# Patient Record
Sex: FEMALE | Race: WHITE | Employment: FULL TIME | URBAN - METROPOLITAN AREA
[De-identification: names, ages, dates, MRNs, and addresses within clinical notes are randomized per-mention and may not be internally consistent; named-entity substitution may affect disease eponyms.]

---

## 2018-10-06 ENCOUNTER — NURSE TRIAGE (OUTPATIENT)
Dept: NURSING | Facility: CLINIC | Age: 23
End: 2018-10-06

## 2018-10-06 NOTE — TELEPHONE ENCOUNTER
Pt states she woke this Am w/ mild to moderate sore throat. This continued for several hours then improved. Currently no throat pain at all. States she thinks she has fever because she feels chilly but cannot find thermometer so we do not know for sure. Denies breathing or swallowing difficulty. C/o mild cough. No nasal congestion. No ear pain. 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 Advised home care per guideline. Advised pt get thermometer and if temp is over 102 she may wish to be seen at  tomorrow. Call if new/worse sx. Pt voiced understanding and agreement. Livier Quintero RN/FNA      Additional Information    Negative: Severe difficulty breathing (e.g., struggling for each breath, speaks in single words, stridor)    Negative: Sounds like a life-threatening emergency to the triager    Negative: [1] Diagnosed strep throat AND [2] taking antibiotic AND [3] symptoms continue    Negative: Throat culture results, call about    Negative: Productive cough is main symptom    Negative: Non-productive cough is main symptom    Negative: Hoarseness is main symptom    Negative: Runny nose is main symptom    Negative: [1] Drooling or spitting out saliva (because can't swallow) AND [2] normal breathing    Negative: Unable to open mouth completely    Negative: [1] Difficulty breathing AND [2] not severe    Negative: [1] Refuses to drink anything AND [2] for > 12 hours    Negative: [1] Drinking very little AND [2] dehydration suspected (e.g., no urine > 12 hours, very dry mouth, very lightheaded)    Negative: Patient sounds very sick or weak to the triager    Negative: SEVERE (e.g., excruciating) throat pain    Negative: [1] Rash AND [2] widespread (especially chest and abdomen)    Negative: Earache also present    Negative: Fever present > 3 days (72 hours)    Negative: Diabetes mellitus or weak immune system (e.g., HIV positive, cancer chemo,  "splenectomy, organ transplant)    Negative: History of rheumatic fever    Negative: [1] Adult is leaving on a trip AND [2] requests an antibiotic NOW    Negative: [1] Positive throat culture or rapid strep test (according to lab, PCP, caller, etc.) AND [2] NO  standing order to call in prescription for antibiotic    Negative: [1] Exposure to family member (or spouse or boyfriend/girlfriend) with test-proven strep AND [2] within last 10 days    Negative: [1] Sore throat is the only symptom AND [2] present > 48 hours    Negative: [1] Sore throat with cough/cold symptoms AND [2] present > 5 days    Negative: [1] Sore throat is the only symptom AND [2] sore throat present < 48 hours  (all triage questions negative)    [1] Sore throat with cough/cold symptoms AND [2] present < 5 days   (all triage questions negative)    Commented on: Fever > 104 F (40 C)     Temp unknown. Couldn't find thermometer.    Commented on: [1] Pus on tonsils (back of throat) AND [2]  fever AND [3] swollen neck lymph nodes (\"glands\")     Temp unknown. Couldn't find thermometer.    Protocols used: SORE THROAT-ADULT-    "

## 2019-08-09 NOTE — PROGRESS NOTES
SUBJECTIVE:   CC: Mora Maldonado is an 24 year old woman who presents for preventive health visit.     Healthy Habits:    Do you get at least three servings of calcium containing foods daily (dairy, green leafy vegetables, etc.)? no    Amount of exercise or daily activities, outside of work: Active at work and goes to the gym, rock climbing     Problems taking medications regularly not applicable    Medication side effects: No    Have you had an eye exam in the past two years? no    Do you see a dentist twice per year? no    Do you have sleep apnea, excessive snoring or daytime drowsiness?yes, drowsiness    Today's PHQ-2 Score:   PHQ-2 ( 1999 [employer]) 8/13/2019   Q1: Little interest or pleasure in doing things 0   Q2: Feeling down, depressed or hopeless 0   PHQ-2 Score 0       Abuse: Current or Past(Physical, Sexual or Emotional)- No  Do you feel safe in your environment? Yes    Social History     Tobacco Use     Smoking status: Never Smoker     Smokeless tobacco: Never Used   Substance Use Topics     Alcohol use: Yes     Frequency: Monthly or less     If you drink alcohol do you typically have >3 drinks per day or >7 drinks per week? No                     Reviewed orders with patient.  Reviewed health maintenance and updated orders accordingly - Yes  Lab work is in process  There is no problem list on file for this patient.    No past surgical history on file.    Social History     Tobacco Use     Smoking status: Never Smoker     Smokeless tobacco: Never Used   Substance Use Topics     Alcohol use: Yes     Frequency: Monthly or less     Family History   Problem Relation Age of Onset     Rheumatoid Arthritis Mother      Thyroid Disease Maternal Grandmother          Current Outpatient Medications   Medication Sig Dispense Refill     citalopram (CELEXA) 20 MG tablet Take 20 mg by mouth       etonogestrel (IMPLANON/NEXPLANON) 68 MG IMPL Inject 68 mg Subcutaneous       Allergies   Allergen Reactions      "Amoxicillin Hives     Penicillins Hives       Mammogram not appropriate for this patient based on age.    Pertinent mammograms are reviewed under the imaging tab.  History of abnormal Pap smear: NO - age 21-29 PAP every 3 years recommended. Due for PAP this visit, unfortunately bleeding currently. LMP: 08/7/2019     Reviewed and updated as needed this visit by clinical staff  Tobacco  Allergies  Meds  Soc Hx      Reviewed and updated as needed this visit by Provider  Allergies        No past medical history on file.   No past surgical history on file.  OB History   No data available       ROS:  CONSTITUTIONAL: NEGATIVE for fever, chills, change in weight  INTEGUMENTARU/SKIN: NEGATIVE for worrisome rashes, moles or lesions  EYES: NEGATIVE for vision changes or irritation  ENT: NEGATIVE for ear, mouth and throat problems  RESP: NEGATIVE for significant cough or SOB  BREAST: NEGATIVE for masses, tenderness or discharge  CV: NEGATIVE for chest pain, palpitations or peripheral edema  GI: NEGATIVE for nausea, abdominal pain, heartburn, or change in bowel habits  : NEGATIVE for unusual urinary or vaginal symptoms. Periods are regular.  MUSCULOSKELETAL: NEGATIVE for significant arthralgias or myalgia  NEURO: NEGATIVE for weakness, dizziness or paresthesias  ENDOCRINE: POSITIVE  for hair loss and weight gain  HEME/ALLERGY/IMMUNE: NEGATIVE for bleeding problems  PSYCHIATRIC: NEGATIVE for changes in mood or affect    OBJECTIVE:   /82   Pulse 76   Temp 98.6  F (37  C) (Tympanic)   Resp 20   Ht 1.59 m (5' 2.6\")   Wt 72.5 kg (159 lb 12.8 oz)   SpO2 100%   BMI 28.67 kg/m    EXAM:  GENERAL: healthy, alert and no distress  EYES: Eyes grossly normal to inspection, PERRL and conjunctivae and sclerae normal  HENT: ear canals and TM's normal, nose and mouth without ulcers or lesions  NECK: no adenopathy, no asymmetry, masses, or scars and thyroid normal to palpation  RESP: lungs clear to auscultation - no rales, " "rhonchi or wheezes  BREAST: normal without masses, tenderness or nipple discharge and no palpable axillary masses or adenopathy  CV: regular rate and rhythm, normal S1 S2, no S3 or S4, no murmur, click or rub, no peripheral edema and peripheral pulses strong  ABDOMEN: soft, nontender, no hepatosplenomegaly, no masses and bowel sounds normal  MS: no gross musculoskeletal defects noted, no edema  SKIN: no suspicious lesions or rashes  NEURO: Normal strength and tone, mentation intact and speech normal  PSYCH: mentation appears normal, affect normal/bright  LYMPH: no cervical, supraclavicular, axillary, or inguinal adenopathy    Diagnostic Test Results:  No results found for this or any previous visit (from the past 24 hour(s)).    ASSESSMENT/PLAN:   1. Routine general medical examination at a health care facility  - TSH with free T4 reflex  - Strongly encourage to follow up for PAP only visit     COUNSELING:   Reviewed preventive health counseling, as reflected in patient instructions  Special attention given to:        Regular exercise       Healthy diet/nutrition       Vision screening       Hearing screening       Contraception       Safe sex practices/STD prevention    Estimated body mass index is 28.67 kg/m  as calculated from the following:    Height as of this encounter: 1.59 m (5' 2.6\").    Weight as of this encounter: 72.5 kg (159 lb 12.8 oz).         reports that she has never smoked. She has never used smokeless tobacco.      Counseling Resources:  ATP IV Guidelines  Pooled Cohorts Equation Calculator  Breast Cancer Risk Calculator  FRAX Risk Assessment  ICSI Preventive Guidelines  Dietary Guidelines for Americans, 2010  USDA's MyPlate  ASA Prophylaxis  Lung CA Screening    SHAWNEE Lucio [facility]  "

## 2019-08-13 RX ORDER — CITALOPRAM HYDROBROMIDE 20 MG/1
20 TABLET ORAL
COMMUNITY
Start: 2017-12-12 | End: 2019-09-03

## 2019-08-14 ENCOUNTER — OFFICE VISIT (OUTPATIENT)
Dept: FAMILY MEDICINE | Facility: CLINIC | Age: 24
End: 2019-08-14
Payer: COMMERCIAL

## 2019-08-14 VITALS
HEART RATE: 76 BPM | DIASTOLIC BLOOD PRESSURE: 82 MMHG | HEIGHT: 63 IN | RESPIRATION RATE: 20 BRPM | WEIGHT: 159.8 LBS | TEMPERATURE: 98.6 F | OXYGEN SATURATION: 100 % | BODY MASS INDEX: 28.31 KG/M2 | SYSTOLIC BLOOD PRESSURE: 120 MMHG

## 2019-08-14 DIAGNOSIS — Z00.00 ROUTINE GENERAL MEDICAL EXAMINATION AT A HEALTH CARE FACILITY: Primary | ICD-10-CM

## 2019-08-14 LAB
T4 FREE SERPL-MCNC: 0.7 NG/DL (ref 0.76–1.46)
TSH SERPL DL<=0.005 MIU/L-ACNC: 18.12 MU/L (ref 0.4–4)

## 2019-08-14 PROCEDURE — 36415 COLL VENOUS BLD VENIPUNCTURE: CPT | Performed by: NURSE PRACTITIONER

## 2019-08-14 PROCEDURE — 99395 PREV VISIT EST AGE 18-39: CPT | Performed by: NURSE PRACTITIONER

## 2019-08-14 PROCEDURE — 84439 ASSAY OF FREE THYROXINE: CPT | Performed by: NURSE PRACTITIONER

## 2019-08-14 PROCEDURE — 84443 ASSAY THYROID STIM HORMONE: CPT | Performed by: NURSE PRACTITIONER

## 2019-08-14 SDOH — HEALTH STABILITY: MENTAL HEALTH: HOW OFTEN DO YOU HAVE A DRINK CONTAINING ALCOHOL?: MONTHLY OR LESS

## 2019-08-14 ASSESSMENT — MIFFLIN-ST. JEOR: SCORE: 1427.59

## 2019-08-16 ENCOUNTER — TELEPHONE (OUTPATIENT)
Dept: FAMILY MEDICINE | Facility: CLINIC | Age: 24
End: 2019-08-16

## 2019-08-19 NOTE — TELEPHONE ENCOUNTER
Labs have not been reviewed.  Provider who saw patient is just helping clinic out and is currently not in clinic.  Will send to provider pool to advise on abnormal labs.

## 2019-08-19 NOTE — TELEPHONE ENCOUNTER
Reason for Call:  Other call back    Detailed comments: Patient is calling for status on message left 08/16.    Phone Number Patient can be reached at: [phone]     Best Time:     Can we leave a detailed message on this number? YES    Call taken on 8/19/2019 at 9:55 AM by Fannie Barbour

## 2019-08-19 NOTE — TELEPHONE ENCOUNTER
Patient informed of directives from Margi Kay and verbalized a good understanding.      Appointment scheduled with Kyara Mckee NP on 9/3/19.     Radha Mota RN BSN on 8/19/2019 at 6:39 PM

## 2019-08-19 NOTE — TELEPHONE ENCOUNTER
Looks as though her thyroid is under functioning - patient should follow up with a provider to discuss treatment and follow up

## 2019-08-29 NOTE — PATIENT INSTRUCTIONS
Reminders: If you are signed up for [facility] please be aware your results and communications will be sent within your "[employer]hart. Please remember to arrive 5-10 minutes early for your appointments. If you are late you may need to reschedule your appointment.    Patient Education     Hypothyroidism    You have hypothyroidism. This means your thyroid gland is not making enough thyroid hormone. This hormone is vital to body growth and metabolism. If you don t make enough, many body processes slow down. This can cause symptoms throughout the body. Hypothyroidism can range from mild to severe. The most severe form is called myxedema.  There are a number of causes of hypothyroidism. A common cause is Hashimoto s disease. This disease causes the body s own immune system to attack the thyroid gland. When you have certain treatments, such as surgery to remove the thyroid gland, this can also cause hypothyroidism. Sometimes the thyroid gland is not functioning because of lack of stimulation from the pituitary gland.  Symptoms of hypothyroidism can include:    Fatigue    Trouble concentrating or thinking clearly; forgetfulness    Dry skin    Hair loss    Weight gain    Low tolerance to cold    Constipation    Depression    Personality changes    Tingling or prickling of the hands or feet    Heavy, absent, or irregular periods (women only)  Older adults may sometimes have other symptoms. These can include:    Muscle aches and weakness    Confusion    Incontinence (unable to control urine or stool)    Trouble moving around    Falling  Treatment for hypothyroidism involves taking thyroid hormone pills daily. These pills replace the hormone your thyroid doesn t make. You will likely need to take a daily pill for the rest of your life. Tips for taking this medicine are given below.  Home care  Tips for taking your medicine    Take your thyroid hormone pills as prescribed by your healthcare provider. This is most often 1 pill a day on  an empty stomach. Use a pillbox labeled with the days of the week. This will help you remember to take your pill each day.    Don t take products that contain iron and calcium or antacids within 4 hours of taking your thyroid hormone pills.    Don t take other medicines with your thyroid hormone pill without checking with your provider first.    Tell your provider if you have any side effects from your medicines that bother you, especially any chest pain or irregular heartbeats.    Never change the dosage or stop taking your thyroid pills without talking to your provider first.  General care    Always talk with your provider before trying other medicines or treatments for your thyroid problem.    If you see other healthcare providers, be sure to let them know about your thyroid problem.    Let your healthcare provider know if you become pregnant because your dose of thyroid hormone will need to be adjusted.  Follow-up care  See your healthcare provider for checkups as advised. You may need regular tests to check the level of thyroid hormone in your blood.  When to seek medical advice  Call your healthcare provider right away if any of these occur:    New symptoms develop    Symptoms return, continue, or worsen even after treatment    Extreme fatigue    Puffy hands, face, or feet    Fast or irregular heartbeat    Confusion  Call 911  Call 911 if any of these occur:    Fainting    Chest pain    Shortness of breath or trouble breathing  Date Last Reviewed: 4/1/2018 2000-2018 [employer]. [address] [place] [address], [place] [address]. All rights reserved. This information is not intended as a substitute for professional medical care. Always follow your healthcare professional's instructions.

## 2019-08-29 NOTE — PROGRESS NOTES
Subjective     Mora Maldonado is a 24 year old female who presents to clinic today for the following health issues:    HPI   Patient is here to discuss her thyroid labs- apologized to carlota that she was never told about her thyroid labs. Discussed her level, that guidelines state to not treat unless symptomatic or >10.  She is greater than 10- has had symptoms of weight gain, hair thinning, skin break outs, joints hurt.  She reports that he mother and grand mother have thyroid issues, and her mother has RA and is being worked up for lupus.  She would like labs done.     Patient is also here for a pap        There is no problem list on file for this patient.    History reviewed. No pertinent surgical history.    Social History     Tobacco Use     Smoking status: Never Smoker     Smokeless tobacco: Never Used   Substance Use Topics     Alcohol use: Yes     Frequency: Monthly or less     Family History   Problem Relation Age of Onset     Rheumatoid Arthritis Mother      Thyroid Disease Maternal Grandmother          Current Outpatient Medications   Medication Sig Dispense Refill     etonogestrel (IMPLANON/NEXPLANON) 68 MG IMPL Inject 68 mg Subcutaneous       levothyroxine (SYNTHROID/LEVOTHROID) 50 MCG tablet Take 1 tablet (50 mcg) by mouth daily 90 tablet 0     Allergies   Allergen Reactions     Amoxicillin Hives     Penicillins Hives     Recent Labs   Lab Test 08/14/19  1341   TSH 18.12*      BP Readings from Last 3 Encounters:   09/03/19 130/84   08/14/19 120/82    Wt Readings from Last 3 Encounters:   09/03/19 72.9 kg (160 lb 12.8 oz)   08/14/19 72.5 kg (159 lb 12.8 oz)            Reviewed and updated as needed this visit by Provider  Tobacco  Allergies  Meds  Problems  Med Hx  Surg Hx  Fam Hx         Review of Systems   ROS COMP: Constitutional, HEENT, cardiovascular, pulmonary, GI, , musculoskeletal, neuro, skin, endocrine and psych systems are negative, except as otherwise noted.      Objective    BP  "130/84   Pulse 81   Temp 98.7  F (37.1  C) (Oral)   Resp 16   Wt 72.9 kg (160 lb 12.8 oz)   LMP 08/23/2019 (Approximate)   SpO2 98%   BMI 28.85 kg/m    Body mass index is 28.85 kg/m .     Physical Exam   GENERAL: healthy, alert and no distress  NECK: no adenopathy, no asymmetry, masses, or scars and thyroid normal to palpation  RESP: lungs clear to auscultation - no rales, rhonchi or wheezes  CV: regular rate and rhythm, normal S1 S2, no S3 or S4, no murmur, click or rub, no peripheral edema and peripheral pulses strong  MS: no gross musculoskeletal defects noted, no edema, no CVA tenderness  SKIN: no suspicious rashes  PSYCH: mentation appears normal, affect normal/bright    Diagnostic Test Results:  Labs reviewed in Epic  See orders        Assessment & Plan       ICD-10-CM    1. Screening for malignant neoplasm of cervix Z12.4 Pap imaged thin layer screen reflex to HPV if ASCUS - recommend age 25 - 29   2. Family history of rheumatoid arthritis Z82.61 Anti Nuclear Debbi IgG by IFA with Reflex     Rheumatoid factor     CRP inflammation     Erythrocyte sedimentation rate auto   3. Hypothyroidism, unspecified type E03.9 levothyroxine (SYNTHROID/LEVOTHROID) 50 MCG tablet     **TSH with free T4 reflex FUTURE 2mo     **TSH with free T4 reflex FUTURE 6mo   4. Screening for HIV (human immunodeficiency virus) Z11.4 HIV Screening        BMI:   Estimated body mass index is 28.85 kg/m  as calculated from the following:    Height as of 8/14/19: 1.59 m (5' 2.6\").    Weight as of this encounter: 72.9 kg (160 lb 12.8 oz).   Weight management plan: Discussed healthy diet and exercise guidelines        See Patient Instructions    Return in about 3 months (around 12/3/2019), or if symptoms worsen or fail to improve.    Kyara Mckee, ABELINO  [facility]      "

## 2019-09-03 ENCOUNTER — RESULT FOLLOW UP (OUTPATIENT)
Dept: FAMILY MEDICINE | Facility: CLINIC | Age: 24
End: 2019-09-03

## 2019-09-03 ENCOUNTER — OFFICE VISIT (OUTPATIENT)
Dept: FAMILY MEDICINE | Facility: CLINIC | Age: 24
End: 2019-09-03
Payer: COMMERCIAL

## 2019-09-03 VITALS
HEART RATE: 81 BPM | SYSTOLIC BLOOD PRESSURE: 130 MMHG | TEMPERATURE: 98.7 F | DIASTOLIC BLOOD PRESSURE: 84 MMHG | RESPIRATION RATE: 16 BRPM | BODY MASS INDEX: 28.85 KG/M2 | WEIGHT: 160.8 LBS | OXYGEN SATURATION: 98 %

## 2019-09-03 DIAGNOSIS — R87.612 PAPANICOLAOU SMEAR OF CERVIX WITH LOW GRADE SQUAMOUS INTRAEPITHELIAL LESION (LGSIL): ICD-10-CM

## 2019-09-03 DIAGNOSIS — Z11.4 SCREENING FOR HIV (HUMAN IMMUNODEFICIENCY VIRUS): ICD-10-CM

## 2019-09-03 DIAGNOSIS — Z82.61 FAMILY HISTORY OF RHEUMATOID ARTHRITIS: ICD-10-CM

## 2019-09-03 DIAGNOSIS — E03.9 HYPOTHYROIDISM, UNSPECIFIED TYPE: ICD-10-CM

## 2019-09-03 DIAGNOSIS — Z12.4 SCREENING FOR MALIGNANT NEOPLASM OF CERVIX: Primary | ICD-10-CM

## 2019-09-03 LAB — ERYTHROCYTE [SEDIMENTATION RATE] IN BLOOD BY WESTERGREN METHOD: 9 MM/H (ref 0–20)

## 2019-09-03 PROCEDURE — 86431 RHEUMATOID FACTOR QUANT: CPT | Performed by: NURSE PRACTITIONER

## 2019-09-03 PROCEDURE — 86140 C-REACTIVE PROTEIN: CPT | Performed by: NURSE PRACTITIONER

## 2019-09-03 PROCEDURE — 85652 RBC SED RATE AUTOMATED: CPT | Performed by: NURSE PRACTITIONER

## 2019-09-03 PROCEDURE — 87389 HIV-1 AG W/HIV-1&-2 AB AG IA: CPT | Performed by: NURSE PRACTITIONER

## 2019-09-03 PROCEDURE — G0145 SCR C/V CYTO,THINLAYER,RESCR: HCPCS | Performed by: NURSE PRACTITIONER

## 2019-09-03 PROCEDURE — 86038 ANTINUCLEAR ANTIBODIES: CPT | Performed by: NURSE PRACTITIONER

## 2019-09-03 PROCEDURE — 86039 ANTINUCLEAR ANTIBODIES (ANA): CPT | Performed by: NURSE PRACTITIONER

## 2019-09-03 PROCEDURE — 99214 OFFICE O/P EST MOD 30 MIN: CPT | Performed by: NURSE PRACTITIONER

## 2019-09-03 PROCEDURE — G0124 SCREEN C/V THIN LAYER BY MD: HCPCS | Performed by: NURSE PRACTITIONER

## 2019-09-03 PROCEDURE — 36415 COLL VENOUS BLD VENIPUNCTURE: CPT | Performed by: NURSE PRACTITIONER

## 2019-09-03 RX ORDER — LEVOTHYROXINE SODIUM 50 UG/1
50 TABLET ORAL DAILY
Qty: 90 TABLET | Refills: 0 | Status: SHIPPED | OUTPATIENT
Start: 2019-09-03 | End: 2019-11-19

## 2019-09-04 LAB
CRP SERPL-MCNC: <2.9 MG/L (ref 0–8)
HIV 1+2 AB+HIV1 P24 AG SERPL QL IA: NONREACTIVE
RHEUMATOID FACT SER NEPH-ACNC: <20 IU/ML (ref 0–20)

## 2019-09-05 DIAGNOSIS — Z83.2 FAMILY HISTORY OF AUTOIMMUNE DISORDER: Primary | ICD-10-CM

## 2019-09-05 DIAGNOSIS — E03.9 HYPOTHYROIDISM, UNSPECIFIED TYPE: ICD-10-CM

## 2019-09-05 DIAGNOSIS — R76.0 ABNORMAL ANTINUCLEAR ANTIBODY TITER: ICD-10-CM

## 2019-09-05 LAB
ANA PAT SER IF-IMP: ABNORMAL
ANA SER QL IF: ABNORMAL
ANA TITR SER IF: ABNORMAL {TITER}

## 2019-09-05 NOTE — RESULT ENCOUNTER NOTE
Brandyn Velazco,    Thank you for your recent office visit.    Here are your recent results.  Your blood labs do show that you are borderline cameron positive; which can mean you are predisposed for autoimmune issues.  Due to your family history, I am placing a referral for you to further discuss with rheumatology.  Please schedule with them at this time.  Remember to have your thyroid labs rechecked in 2-3 months. Follow up as needed.     Feel free to contact me via [employer] or call the clinic at [phone].    Sincerely,    SHAWNEE Gallegos, FNP-BC

## 2019-09-06 LAB
COPATH REPORT: ABNORMAL
PAP: ABNORMAL

## 2019-09-09 PROBLEM — E03.9 HYPOTHYROIDISM, UNSPECIFIED TYPE: Status: ACTIVE | Noted: 2019-09-09

## 2019-09-20 ENCOUNTER — OFFICE VISIT (OUTPATIENT)
Dept: OBGYN | Facility: CLINIC | Age: 24
End: 2019-09-20
Payer: COMMERCIAL

## 2019-09-20 VITALS
WEIGHT: 160.2 LBS | HEART RATE: 92 BPM | DIASTOLIC BLOOD PRESSURE: 74 MMHG | SYSTOLIC BLOOD PRESSURE: 119 MMHG | BODY MASS INDEX: 28.74 KG/M2

## 2019-09-20 DIAGNOSIS — R87.612 PAPANICOLAOU SMEAR OF CERVIX WITH LOW GRADE SQUAMOUS INTRAEPITHELIAL LESION (LGSIL): Primary | ICD-10-CM

## 2019-09-20 PROCEDURE — 99203 OFFICE O/P NEW LOW 30 MIN: CPT | Mod: 25 | Performed by: OBSTETRICS & GYNECOLOGY

## 2019-09-20 PROCEDURE — 57454 BX/CURETT OF CERVIX W/SCOPE: CPT | Performed by: OBSTETRICS & GYNECOLOGY

## 2019-09-20 PROCEDURE — 88305 TISSUE EXAM BY PATHOLOGIST: CPT | Performed by: OBSTETRICS & GYNECOLOGY

## 2019-09-20 NOTE — PROGRESS NOTES
Patient Name: Mora Maldonado              Date: 9/20/2019   YOB: 1995                         Age: 24 year old   Phone: [phone] (home)   ________________________________________________________________________  I have been asked to see Mora in consultation by TIFFANY BERNAL  to discuss the pap smear, findings and possible further evaluation.  The patient's pap smear history is as noted:  2014 NIL pap. (Found in Care Everywhere).  9/3/19 LSIL pap. Plan colp.     I attempted to ensure that the patient was educated regarding the nature of her findings and implications to date.  We reviewed the role of HPV, incidence in the population and the natural history of the infection, and its transmission.  We also reviewed ways to minimize her future risk, the effect of HPV on the cervix and treatment options available, should they be indicated.    The pathophysiology of the cervix, including a discussion of the squamous and columnar cells, metaplasia and dysplasia have been reviewed, drawings, sketches and the pamphlets were reviewed with her.      Patient's last menstrual period was 08/23/2019 (approximate).  Current Birth Control Method: IMPLANT    Past Medical History:   Diagnosis Date     Abnormal Pap smear of cervix 09/03/2019    See problem list     Thyroid disease        History reviewed. No pertinent surgical history.     Outpatient Encounter Medications as of 9/20/2019   Medication Sig Dispense Refill     etonogestrel (IMPLANON/NEXPLANON) 68 MG IMPL Inject 68 mg Subcutaneous       levothyroxine (SYNTHROID/LEVOTHROID) 50 MCG tablet Take 1 tablet (50 mcg) by mouth daily 90 tablet 0     No facility-administered encounter medications on file as of 9/20/2019.         Allergies as of 09/20/2019 - Reviewed 09/20/2019   Allergen Reaction Noted     Amoxicillin Hives 08/13/2019     Penicillins Hives 08/13/2019       Social History     Socioeconomic History     Marital status: Single     Spouse name: None      Number of children: None     Years of education: None     Highest education level: None   Occupational History     None   Social Needs     Financial resource strain: None     Food insecurity:     Worry: None     Inability: None     Transportation needs:     Medical: None     Non-medical: None   Tobacco Use     Smoking status: Never Smoker     Smokeless tobacco: Never Used   Substance and Sexual Activity     Alcohol use: Not Currently     Frequency: Monthly or less     Drug use: Never     Sexual activity: Yes     Partners: Male     Birth control/protection: Implant   Lifestyle     Physical activity:     Days per week: None     Minutes per session: None     Stress: None   Relationships     Social connections:     Talks on phone: None     Gets together: None     Attends [employer] service: None     Active member of club or organization: None     Attends meetings of clubs or organizations: None     Relationship status: None     Intimate partner violence:     Fear of current or ex partner: None     Emotionally abused: None     Physically abused: None     Forced sexual activity: None   Other Topics Concern     Parent/sibling w/ CABG, MI or angioplasty before 65F 55M? Not Asked   Social History Narrative     None        Family History   Problem Relation Age of Onset     Rheumatoid Arthritis Mother      Thyroid Disease Maternal Grandmother          Review Of Systems  10 point ROS of systems including Constitutional, Eyes, Respiratory, Cardiovascular, Gastroenterology, Genitourinary, Integumentary, Muscularskeletal, Psychiatric were all negative except for pertinent positives noted in my HPI and in the PMH.      Exam:   /74   Pulse 92   Wt 72.7 kg (160 lb 3.2 oz)   LMP 08/23/2019 (Approximate)   Breastfeeding? No   BMI 28.74 kg/m    GENERAL:  WNWD female NAD  HEENT: NC/AT, EOMI  Lungs:  Good respiratory effort   SKIN: normal skin turgor  GAIT: Normal  NECK: Symmetrical, no masses noted   VULVA: Normal  Genitalia  BUS: Normal  URETHRA:  No hypermobility noted  URETHRAL MEATUS:  No masses noted  VAGINA: Normal mucosa, no discharge  CERVIX: Closed, mobile, no discharge  PERIANAL:  No masses or lesions seen  EXTREMITIES: no clubbing, cyanosis, or edema    Assessment:  lgsil    Plan:  Recommend to Proceed with Colpo  The details of the colposcopic procedure were reviewed, the risks of missed diagnoses, pain, infection, and bleeding.        Procedure:  Procedure for colposcopy and biopsy has been explained to the patient and consent obtained.    Before the procedure, it was ensured that the patient was educated regarding the nature of her findings and implications to date.  We reviewed the role of HPV and the natural history of the infection.  We also reviewed ways to minimize her future risk, the effect of HPV on the cervix and treatment options available, should they be indicated.    The pathophysiology of the cervix, including a discussion of the squamous and columnar cells, metaplasia and dysplasia have been reviewed, drawings, sketches and the pamphlets were reviewed with her.  The details of the colposcopic procedure were reviewed, the risks of missed diagnoses, pain, infection, and bleeding.  Questions seemed to be answered before proceeding and the patient then consented to the procedure.     Speculum placed in vagina and excellent visualization of cervix achieved, cervix swabbed  with acetic acid solution.    biopsies taken  ECC AND CERVIX AT 1 OCLOCK.  Hemostasis effected with Silver Nitrate.     Findings:    Cervix: acetowhitening noted 1 oclock  Vaginal inspection: no visible lesions.  Procedure Summary: Patient tolerated procedure well.      Assessment:     ICD-10-CM    1. Papanicolaou smear of cervix with low grade squamous intraepithelial lesion (LGSIL) R87.612 Surgical pathology exam     COLP CERVIX/UPPER VAGINA W BX CERVIX/ENDOCERV CURETT           Plan:  Specimens labelled and sent to pathology.  Will  base further treatment on pathology findings.  Post biopsy instructions given to patient and call to discuss Pathology results.  TT 30 min, in addition to the time for the procedure  CT greater than 50%, as noted above in the HPI and in the Plan.   CEPHAS AGBEH, MD.

## 2019-09-20 NOTE — PATIENT INSTRUCTIONS
If you have any questions regarding your visit, Please contact your care team.  [facility]: [phone]  [facility] CLINIC HOURS TELEPHONE NUMBER   Cephas Agbeh, M.D. Lisa -       Lissy aWrd         Monday-Ta    8:00a.m-4:45 p.m    Tuesday--[place]     8:00a.m-4:45 p.m.    Thursday-Ta    8:00a.m-4:45 p.m.    Friday-Ta    8:00a.m-4:45 p.m    [facility]   [address] [place] [address]   [phone]-Ask for [facility]   Fax [phone]   [id number]-[phone]     [facility]   [address] [place] [address] [place] [address]   [phone]    [facility]  [address] [facility] [address]  [phone]  [id number]-[phone]   Urgent Care locations:    [facility] Monday-Friday  5 pm - 9 pm  Saturday and Sunday   9 am - 5 pm   Monday-Friday   5 pm - 9 pm  Saturday and Sunday  9 am - 5 pm    [phone]   If you need a medication refill, please contact your pharmacy. Please allow 3 business days for your refill to be completed.  As always, Thank you for trusting us with your healthcare needs!

## 2019-09-27 ENCOUNTER — TELEPHONE (OUTPATIENT)
Dept: OBGYN | Facility: CLINIC | Age: 24
End: 2019-09-27

## 2019-09-27 LAB — COPATH REPORT: NORMAL

## 2019-09-27 NOTE — TELEPHONE ENCOUNTER
Patient returned call to clinic and spoke with RN. RN relayed message below and would follow up Monday with her regarding results and status.     Patient verbalized understanding and agreed to plan.     Deana Baltazar RN on 9/27/2019 at 2:23 PM

## 2019-09-27 NOTE — TELEPHONE ENCOUNTER
"Review results and pathology is still \"In Process\". Sample was collected on 9/20/19.     RN called [facility] [phone].  states she will call pathology and get the status of results. RN gave her direct number to return call.    Deana Baltazar RN on 9/27/2019 at 1:33 PM    "

## 2019-09-27 NOTE — TELEPHONE ENCOUNTER
called RN back from [facility] and states she was given a case #[id number] and a phone number [phone] for Dr. Julianne Gu.     RN called Dr. Gu and was given case number, she states it is not complete but she will get it done today.     RN will wait for results.     RN called patient to notify her that results are not done yet and she can expect them by early next.     Unable to reach patient via phone. Left message to call clinic back at [phone] and ask for Women's Health.    Deana Baltazar RN on 9/27/2019 at 1:47 PM

## 2019-09-30 ENCOUNTER — HEALTH MAINTENANCE LETTER (OUTPATIENT)
Age: 24
End: 2019-09-30

## 2019-09-30 NOTE — TELEPHONE ENCOUNTER
Patient reviewed pathology results via [employer].     Patient Result Comments     Viewed by Mora Maldonado on 9/30/2019  9:32 AM   Written by Agbeh, Cephas Mawuena, MD on 9/30/2019  9:25 AM   Your colposcopy is consistent with your pap. NO CANCER.   Return to clinic for pap in 12 months.co-test   CEPHAS AGBEH, MD.    Patient Release Status:     This result is viewable by the patient in MyChart.   Last viewed in MyChart:     9/30/2019  9:32 AM   By:     Mora Baltazar RN on 9/30/2019 at 9:41 AM

## 2019-10-01 ENCOUNTER — TELEPHONE (OUTPATIENT)
Dept: OBGYN | Facility: CLINIC | Age: 24
End: 2019-10-01

## 2019-10-01 NOTE — TELEPHONE ENCOUNTER
Patient states she would like to go over the results of her colposcopy.  Please call.    Thank you.

## 2019-10-01 NOTE — TELEPHONE ENCOUNTER
Pt had a colposcopy on 9/20/19.  Per result note:   Notes recorded by Agbeh, Cephas Mawuena, MD on 9/30/2019 at 9:25 AM CDT  Your colposcopy is consistent with your pap. NO CANCER.  Return to clinic for pap in 12 months.co-test  CEPHAS AGBEH, MD.    Unable to reach patient via phone. Left message to call clinic back at [phone] and ask for Women's Health.    Evelia Pickard RN

## 2019-10-01 NOTE — TELEPHONE ENCOUNTER
Patient returned call to clinic and spoke to RN she was relayed provider message and results.     VERY PLEASED!     Patient verbalized understanding and agreed to plan.     Deana Baltazar RN on 10/1/2019 at 12:15 PM

## 2019-10-14 ENCOUNTER — MYC MEDICAL ADVICE (OUTPATIENT)
Dept: FAMILY MEDICINE | Facility: CLINIC | Age: 24
End: 2019-10-14

## 2019-10-14 DIAGNOSIS — E07.9 DISORDER OF THYROID: Primary | ICD-10-CM

## 2019-10-17 DIAGNOSIS — E07.9 DISORDER OF THYROID: ICD-10-CM

## 2019-10-17 LAB — TSH SERPL DL<=0.005 MIU/L-ACNC: 0.52 MU/L (ref 0.4–4)

## 2019-10-17 PROCEDURE — 84443 ASSAY THYROID STIM HORMONE: CPT | Performed by: NURSE PRACTITIONER

## 2019-10-17 PROCEDURE — 36415 COLL VENOUS BLD VENIPUNCTURE: CPT | Performed by: NURSE PRACTITIONER

## 2019-10-21 NOTE — RESULT ENCOUNTER NOTE
Brandyn Velazco,    Thank you for your recent office visit.    Here are your recent results.  Your thyroid labs are now normal.  We should continue your levothyroxine at current dosing. Follow up in clinic as needed.    Feel free to contact me via [employer] or call the clinic at [phone].    Sincerely,    Kyara Mckee, SHAWNEE, FNP-BC

## 2019-11-11 NOTE — PATIENT INSTRUCTIONS
Reminders: If you are signed up for [employer] please be aware your results and communications will be sent within your [employer]. Please remember to arrive 5-10 minutes early for your appointments. If you are late you may need to reschedule your appointment.    Preventive Health Recommendations  Female Ages 26 - 39  Yearly exam:   See your health care provider every year in order to    Review health changes.     Discuss preventive care.      Review your medicines if you your doctor has prescribed any.    Until age 30: Get a Pap test every three years (more often if you have had an abnormal result).    After age 30: Talk to your doctor about whether you should have a Pap test every 3 years or have a Pap test with HPV screening every 5 years.   You do not need a Pap test if your uterus was removed (hysterectomy) and you have not had cancer.  You should be tested each year for STDs (sexually transmitted diseases), if you're at risk.   Talk to your provider about how often to have your cholesterol checked.  If you are at risk for diabetes, you should have a diabetes test (fasting glucose).  Shots: Get a flu shot each year. Get a tetanus shot every 10 years.   Nutrition:     Eat at least 5 servings of fruits and vegetables each day.    Eat whole-grain bread, whole-wheat pasta and brown rice instead of white grains and rice.    Get adequate Calcium and Vitamin D.     Lifestyle    Exercise at least 150 minutes a week (30 minutes a day, 5 days of the week). This will help you control your weight and prevent disease.    Limit alcohol to one drink per day.    No smoking.     Wear sunscreen to prevent skin cancer.    See your dentist every six months for an exam and cleaning.

## 2019-11-11 NOTE — PROGRESS NOTES
"Subjective     Mora Maldonado is a 24 year old female who presents to clinic today for the following health issues:    HPI   Hypothyroidism Follow-up      Since last visit, patient describes the following symptoms: weight gain/loss of 5-10 lbs, anxiety, depression and fatigue      How many servings of fruits and vegetables do you eat daily?  2-3    On average, how many sweetened beverages do you drink each day (soda, juice, sweet tea, etc)?   0    How many days per week do you miss taking your medication? 0    {additonal problems for provider to add (Optional):569175}    {HIST REVIEW/ LINKS 2 (Optional):919158}    Reviewed and updated as needed this visit by Provider         Review of Systems   {ROS COMP (Optional):139961}      Objective    There were no vitals taken for this visit.  There is no height or weight on file to calculate BMI.  Physical Exam   {Exam List (Optional):679793}    {Diagnostic Test Results (Optional):084475::\"Diagnostic Test Results:\",\"Labs reviewed in Epic\"}        {PROVIDER CHARTING PREFERENCE:716666}    "

## 2019-11-12 ENCOUNTER — OFFICE VISIT (OUTPATIENT)
Dept: FAMILY MEDICINE | Facility: CLINIC | Age: 24
End: 2019-11-12
Payer: COMMERCIAL

## 2019-11-12 VITALS
DIASTOLIC BLOOD PRESSURE: 75 MMHG | RESPIRATION RATE: 20 BRPM | TEMPERATURE: 98 F | HEART RATE: 90 BPM | SYSTOLIC BLOOD PRESSURE: 113 MMHG | OXYGEN SATURATION: 100 % | BODY MASS INDEX: 28.56 KG/M2 | WEIGHT: 159.2 LBS

## 2019-11-12 DIAGNOSIS — E03.9 HYPOTHYROIDISM, UNSPECIFIED TYPE: ICD-10-CM

## 2019-11-12 DIAGNOSIS — R53.83 FATIGUE, UNSPECIFIED TYPE: ICD-10-CM

## 2019-11-12 DIAGNOSIS — Z13.1 SCREENING FOR DIABETES MELLITUS: ICD-10-CM

## 2019-11-12 DIAGNOSIS — Z13.220 LIPID SCREENING: ICD-10-CM

## 2019-11-12 DIAGNOSIS — Z00.00 ROUTINE GENERAL MEDICAL EXAMINATION AT A HEALTH CARE FACILITY: Primary | ICD-10-CM

## 2019-11-12 DIAGNOSIS — F41.9 ANXIETY: ICD-10-CM

## 2019-11-12 LAB
CHOLEST SERPL-MCNC: 133 MG/DL
HBA1C MFR BLD: 4.9 % (ref 0–5.6)
HDLC SERPL-MCNC: 32 MG/DL
HGB BLD-MCNC: 13.6 G/DL (ref 11.7–15.7)
IRON SATN MFR SERPL: 15 % (ref 15–46)
IRON SERPL-MCNC: 42 UG/DL (ref 35–180)
LDLC SERPL CALC-MCNC: 62 MG/DL
NONHDLC SERPL-MCNC: 101 MG/DL
T4 FREE SERPL-MCNC: 1.1 NG/DL (ref 0.76–1.46)
TIBC SERPL-MCNC: 271 UG/DL (ref 240–430)
TRIGL SERPL-MCNC: 195 MG/DL
TSH SERPL DL<=0.005 MIU/L-ACNC: 0.12 MU/L (ref 0.4–4)
VIT B12 SERPL-MCNC: 252 PG/ML (ref 193–986)

## 2019-11-12 PROCEDURE — 84443 ASSAY THYROID STIM HORMONE: CPT | Performed by: NURSE PRACTITIONER

## 2019-11-12 PROCEDURE — 83036 HEMOGLOBIN GLYCOSYLATED A1C: CPT | Performed by: NURSE PRACTITIONER

## 2019-11-12 PROCEDURE — 99213 OFFICE O/P EST LOW 20 MIN: CPT | Mod: 25 | Performed by: NURSE PRACTITIONER

## 2019-11-12 PROCEDURE — 83540 ASSAY OF IRON: CPT | Performed by: NURSE PRACTITIONER

## 2019-11-12 PROCEDURE — 80061 LIPID PANEL: CPT | Performed by: NURSE PRACTITIONER

## 2019-11-12 PROCEDURE — 36415 COLL VENOUS BLD VENIPUNCTURE: CPT | Performed by: NURSE PRACTITIONER

## 2019-11-12 PROCEDURE — 83550 IRON BINDING TEST: CPT | Performed by: NURSE PRACTITIONER

## 2019-11-12 PROCEDURE — 84439 ASSAY OF FREE THYROXINE: CPT | Performed by: NURSE PRACTITIONER

## 2019-11-12 PROCEDURE — 99395 PREV VISIT EST AGE 18-39: CPT | Performed by: NURSE PRACTITIONER

## 2019-11-12 PROCEDURE — 85018 HEMOGLOBIN: CPT | Performed by: NURSE PRACTITIONER

## 2019-11-12 PROCEDURE — 82306 VITAMIN D 25 HYDROXY: CPT | Performed by: NURSE PRACTITIONER

## 2019-11-12 PROCEDURE — 82607 VITAMIN B-12: CPT | Performed by: NURSE PRACTITIONER

## 2019-11-12 RX ORDER — ESCITALOPRAM OXALATE 5 MG/1
5 TABLET ORAL DAILY
Qty: 30 TABLET | Refills: 1 | Status: SHIPPED | OUTPATIENT
Start: 2019-11-12 | End: 2020-01-08

## 2019-11-12 ASSESSMENT — ANXIETY QUESTIONNAIRES
6. BECOMING EASILY ANNOYED OR IRRITABLE: SEVERAL DAYS
7. FEELING AFRAID AS IF SOMETHING AWFUL MIGHT HAPPEN: MORE THAN HALF THE DAYS
1. FEELING NERVOUS, ANXIOUS, OR ON EDGE: MORE THAN HALF THE DAYS
2. NOT BEING ABLE TO STOP OR CONTROL WORRYING: MORE THAN HALF THE DAYS
3. WORRYING TOO MUCH ABOUT DIFFERENT THINGS: MORE THAN HALF THE DAYS
IF YOU CHECKED OFF ANY PROBLEMS ON THIS QUESTIONNAIRE, HOW DIFFICULT HAVE THESE PROBLEMS MADE IT FOR YOU TO DO YOUR WORK, TAKE CARE OF THINGS AT HOME, OR GET ALONG WITH OTHER PEOPLE: SOMEWHAT DIFFICULT
GAD7 TOTAL SCORE: 12
5. BEING SO RESTLESS THAT IT IS HARD TO SIT STILL: MORE THAN HALF THE DAYS

## 2019-11-12 ASSESSMENT — PATIENT HEALTH QUESTIONNAIRE - PHQ9
SUM OF ALL RESPONSES TO PHQ QUESTIONS 1-9: 19
5. POOR APPETITE OR OVEREATING: SEVERAL DAYS

## 2019-11-12 NOTE — PROGRESS NOTES
SUBJECTIVE:   CC: Mora Maldonado is an 24 year old woman who presents for preventive health visit.     Healthy Habits:    Do you get at least three servings of calcium containing foods daily (dairy, green leafy vegetables, etc.)? no    Amount of exercise or daily activities, outside of work: 3 day(s) per week    Problems taking medications regularly No    Medication side effects: No    Have you had an eye exam in the past two years? no    Do you see a dentist twice per year? no    Do you have sleep apnea, excessive snoring or daytime drowsiness?yes    Patient/Parent of patient informed that anything we discuss that is not related to preventative medicine, may be billed for; patient verbalizes understanding.    Concern(s):  1. Thyroid- still feels really fatigued          Today's PHQ-2 Score:   PHQ-2 ( 1999 [employer]) 11/12/2019 8/13/2019   Q1: Little interest or pleasure in doing things 3 0   Q2: Feeling down, depressed or hopeless 2 0   PHQ-2 Score 5 0       Abuse: Current or Past(Physical, Sexual or Emotional)- No  Do you feel safe in your environment? Yes        Social History     Tobacco Use     Smoking status: Never Smoker     Smokeless tobacco: Never Used   Substance Use Topics     Alcohol use: Not Currently     Frequency: Monthly or less     If you drink alcohol do you typically have >3 drinks per day or >7 drinks per week? No                     Reviewed orders with patient.  Reviewed health maintenance and updated orders accordingly - Yes  Lab work is in process  Labs reviewed in EPIC  BP Readings from Last 3 Encounters:   11/12/19 113/75   09/20/19 119/74   09/03/19 130/84    Wt Readings from Last 3 Encounters:   11/12/19 72.2 kg (159 lb 3.2 oz)   09/20/19 72.7 kg (160 lb 3.2 oz)   09/03/19 72.9 kg (160 lb 12.8 oz)                  Patient Active Problem List   Diagnosis     Papanicolaou smear of cervix with low grade squamous intraepithelial lesion (LGSIL)     Hypothyroidism, unspecified type      History reviewed. No pertinent surgical history.    Social History     Tobacco Use     Smoking status: Never Smoker     Smokeless tobacco: Never Used   Substance Use Topics     Alcohol use: Not Currently     Frequency: Monthly or less     Family History   Problem Relation Age of Onset     Rheumatoid Arthritis Mother      Thyroid Disease Maternal Grandmother          Current Outpatient Medications   Medication Sig Dispense Refill     escitalopram (LEXAPRO) 5 MG tablet Take 1 tablet (5 mg) by mouth daily 30 tablet 1     etonogestrel (IMPLANON/NEXPLANON) 68 MG IMPL Inject 68 mg Subcutaneous       levothyroxine (SYNTHROID/LEVOTHROID) 50 MCG tablet Take 1 tablet (50 mcg) by mouth daily 90 tablet 0     Allergies   Allergen Reactions     Amoxicillin Hives     Penicillins Hives     Recent Labs   Lab Test 10/17/19  1435 19  1341   TSH 0.52 18.12*        Mammogram not appropriate for this patient based on age.    Pertinent mammograms are reviewed under the imaging tab.  History of abnormal Pap smear: NO - age 21-29 PAP every 3 years recommended  PAP / HPV 9/3/2019   PAP LSIL(A)     Reviewed and updated as needed this visit by clinical staff  Tobacco  Allergies  Meds  Problems  Med Hx  Surg Hx  Fam Hx  Soc Hx          Reviewed and updated as needed this visit by Provider  Tobacco  Allergies  Meds  Problems  Med Hx  Surg Hx  Fam Hx        Past Medical History:   Diagnosis Date     Abnormal Pap smear of cervix 2019    See problem list     Thyroid disease       History reviewed. No pertinent surgical history.  OB History    Para Term  AB Living   0 0 0 0 0 0   SAB TAB Ectopic Multiple Live Births   0 0 0 0 0       ROS:  CONSTITUTIONAL: NEGATIVE for fever, chills, change in weight  INTEGUMENTARU/SKIN: NEGATIVE for worrisome rashes, moles or lesions  EYES: NEGATIVE for vision changes or irritation  ENT: NEGATIVE for ear, mouth and throat problems  RESP: NEGATIVE for significant cough or  SOB  BREAST: NEGATIVE for masses, tenderness or discharge  CV: NEGATIVE for chest pain, palpitations or peripheral edema  GI: NEGATIVE for nausea, abdominal pain, heartburn, or change in bowel habits  : NEGATIVE for unusual urinary or vaginal symptoms. Periods are regular.  MUSCULOSKELETAL: NEGATIVE for significant arthralgias or myalgia  NEURO: NEGATIVE for weakness, dizziness or paresthesias  ENDOCRINE: NEGATIVE for temperature intolerance, skin/hair changes  HEME/ALLERGY/IMMUNE: NEGATIVE for bleeding problems  PSYCHIATRIC: NEGATIVE for changes in mood or affect    OBJECTIVE:   /75   Pulse 90   Temp 98  F (36.7  C) (Oral)   Resp 20   Wt 72.2 kg (159 lb 3.2 oz)   SpO2 100%   BMI 28.56 kg/m    EXAM:  GENERAL: healthy, alert and no distress  EYES: Eyes grossly normal to inspection, PERRL and conjunctivae and sclerae normal  HENT: ear canals and TM's normal, nose and mouth without ulcers or lesions  NECK: no adenopathy, no asymmetry, masses, or scars and thyroid normal to palpation  RESP: lungs clear to auscultation - no rales, rhonchi or wheezes  BREAST: deferred- no concerns  CV: regular rate and rhythm, normal S1 S2, no S3 or S4, no murmur, click or rub, no peripheral edema and peripheral pulses strong  ABDOMEN: soft, nontender, no hepatosplenomegaly, no masses and bowel sounds normal, no CVA tenderness   (female): deferred- no concerns  MS: no gross musculoskeletal defects noted, no edema  SKIN: no suspicious lesions or rashes  NEURO: Normal strength and tone, cranial nerves intact, mentation intact and speech normal  PSYCH: mentation appears normal, affect normal/bright  LYMPH: no cervical, supraclavicular, axillary adenopathy    Diagnostic Test Results:  Labs reviewed in Epic  See orders    ASSESSMENT/PLAN:       ICD-10-CM    1. Routine general medical examination at a health care facility Z00.00    2. Fatigue, unspecified type R53.83 Vitamin D Deficiency     Iron and iron binding capacity      "Vitamin B12     Hemoglobin   3. Screening for diabetes mellitus Z13.1 Hemoglobin A1c   4. Lipid screening Z13.220 Lipid panel reflex to direct LDL Non-fasting     CANCELED: Lipid panel reflex to direct LDL Fasting   5. Hypothyroidism, unspecified type E03.9 TSH with free T4 reflex   6. Anxiety F41.9 escitalopram (LEXAPRO) 5 MG tablet       COUNSELING:   Reviewed preventive health counseling, as reflected in patient instructions    Estimated body mass index is 28.56 kg/m  as calculated from the following:    Height as of 8/14/19: 1.59 m (5' 2.6\").    Weight as of this encounter: 72.2 kg (159 lb 3.2 oz).    Weight management plan: Discussed healthy diet and exercise guidelines     reports that she has never smoked. She has never used smokeless tobacco.      Counseling Resources:  ATP IV Guidelines  Pooled Cohorts Equation Calculator  Breast Cancer Risk Calculator  FRAX Risk Assessment  ICSI Preventive Guidelines  Dietary Guidelines for Americans, 2010  USDA's MyPlate  ASA Prophylaxis  Lung CA Screening    VICENTE Alejandre  [facility]  "

## 2019-11-13 LAB — DEPRECATED CALCIDIOL+CALCIFEROL SERPL-MC: 22 UG/L (ref 20–75)

## 2019-11-13 ASSESSMENT — ANXIETY QUESTIONNAIRES: GAD7 TOTAL SCORE: 12

## 2019-11-19 ENCOUNTER — MYC MEDICAL ADVICE (OUTPATIENT)
Dept: FAMILY MEDICINE | Facility: CLINIC | Age: 24
End: 2019-11-19

## 2019-11-19 DIAGNOSIS — E03.9 HYPOTHYROIDISM, UNSPECIFIED TYPE: Primary | ICD-10-CM

## 2019-11-19 DIAGNOSIS — E03.9 HYPOTHYROIDISM, UNSPECIFIED TYPE: ICD-10-CM

## 2019-11-19 RX ORDER — LEVOTHYROXINE SODIUM 25 UG/1
25 TABLET ORAL DAILY
Qty: 90 TABLET | Refills: 3 | Status: SHIPPED | OUTPATIENT
Start: 2019-11-19 | End: 2020-01-20

## 2019-11-19 RX ORDER — LEVOTHYROXINE SODIUM 25 UG/1
50 TABLET ORAL DAILY
Qty: 90 TABLET | Refills: 3 | Status: SHIPPED | OUTPATIENT
Start: 2019-11-19 | End: 2020-01-24

## 2019-11-26 ENCOUNTER — TELEPHONE (OUTPATIENT)
Dept: FAMILY MEDICINE | Facility: CLINIC | Age: 24
End: 2019-11-26

## 2019-11-26 NOTE — TELEPHONE ENCOUNTER
We decreased her dose to 25 mcg due to her lab results. Lab recheck in 2-3 months.  SHAWNEE Gallegos, FNP-BC

## 2019-11-26 NOTE — TELEPHONE ENCOUNTER
Per patient request detailed message left with Kyara Mckee's advise, see below read word for word.

## 2019-11-26 NOTE — TELEPHONE ENCOUNTER
Patient would like a call back from care team on dosage instructions for her Levothyroxine, states recent change made and pharmacy told her to contact clinic for verification. States ok to leave a detailed message if unable to answer.

## 2019-12-06 ENCOUNTER — APPOINTMENT (OUTPATIENT)
Dept: CT IMAGING | Facility: CLINIC | Age: 24
End: 2019-12-06
Attending: PHYSICIAN ASSISTANT
Payer: OTHER MISCELLANEOUS

## 2019-12-06 ENCOUNTER — HOSPITAL ENCOUNTER (EMERGENCY)
Facility: CLINIC | Age: 24
Discharge: HOME | End: 2019-12-06
Attending: PHYSICIAN ASSISTANT | Admitting: PHYSICIAN ASSISTANT
Payer: OTHER MISCELLANEOUS

## 2019-12-06 VITALS
BODY MASS INDEX: 27.97 KG/M2 | HEART RATE: 84 BPM | TEMPERATURE: 97.4 F | RESPIRATION RATE: 18 BRPM | SYSTOLIC BLOOD PRESSURE: 151 MMHG | WEIGHT: 152 LBS | HEIGHT: 62 IN | DIASTOLIC BLOOD PRESSURE: 90 MMHG | OXYGEN SATURATION: 99 %

## 2019-12-06 DIAGNOSIS — S06.0X0A CONCUSSION WITHOUT LOSS OF CONSCIOUSNESS, INITIAL ENCOUNTER: ICD-10-CM

## 2019-12-06 DIAGNOSIS — S09.90XA CLOSED HEAD INJURY, INITIAL ENCOUNTER: ICD-10-CM

## 2019-12-06 PROCEDURE — 99215 OFFICE O/P EST HI 40 MIN: CPT | Mod: Z6 | Performed by: PHYSICIAN ASSISTANT

## 2019-12-06 PROCEDURE — 25000128 H RX IP 250 OP 636: Performed by: PHYSICIAN ASSISTANT

## 2019-12-06 PROCEDURE — 70480 CT ORBIT/EAR/FOSSA W/O DYE: CPT

## 2019-12-06 PROCEDURE — G0463 HOSPITAL OUTPT CLINIC VISIT: HCPCS | Mod: 25 | Performed by: PHYSICIAN ASSISTANT

## 2019-12-06 PROCEDURE — 70450 CT HEAD/BRAIN W/O DYE: CPT

## 2019-12-06 RX ORDER — ONDANSETRON 4 MG/1
4 TABLET, ORALLY DISINTEGRATING ORAL ONCE
Status: COMPLETED | OUTPATIENT
Start: 2019-12-06 | End: 2019-12-06

## 2019-12-06 RX ORDER — ONDANSETRON 4 MG/1
4 TABLET, ORALLY DISINTEGRATING ORAL EVERY 8 HOURS PRN
Qty: 10 TABLET | Refills: 0 | Status: SHIPPED | OUTPATIENT
Start: 2019-12-06 | End: 2020-01-20

## 2019-12-06 RX ADMIN — ONDANSETRON 4 MG: 4 TABLET, ORALLY DISINTEGRATING ORAL at 12:29

## 2019-12-06 ASSESSMENT — ENCOUNTER SYMPTOMS
HEADACHES: 1
LIGHT-HEADEDNESS: 1
SPEECH DIFFICULTY: 0
RHINORRHEA: 0
NUMBNESS: 0
EYE REDNESS: 0
FATIGUE: 1
WOUND: 0
CONFUSION: 0
COLOR CHANGE: 0
EYE PAIN: 0
DIZZINESS: 1
EYE DISCHARGE: 0
RESPIRATORY NEGATIVE: 1
EYE ITCHING: 0
CARDIOVASCULAR NEGATIVE: 1
NECK STIFFNESS: 0
WEAKNESS: 0
NECK PAIN: 1
NAUSEA: 1
PHOTOPHOBIA: 1
ACTIVITY CHANGE: 1
DECREASED CONCENTRATION: 1

## 2019-12-06 ASSESSMENT — MIFFLIN-ST. JEOR: SCORE: 1382.72

## 2019-12-06 NOTE — ED AVS SNAPSHOT
[facility]  [address] [facility] [address]  Phone:  [phone]  Fax:  [phone]                                    Mora Maldonado   MRN: [id number]    Department:  [facility]   Date of Visit:  12/6/2019           After Visit Summary Signature Page    I have received my discharge instructions, and my questions have been answered. I have discussed any challenges I see with this plan with the nurse or doctor.    ..........................................................................................................................................  Patient/Patient Representative Signature      ..........................................................................................................................................  Patient Representative Print Name and Relationship to Patient    ..................................................               ................................................  Date                                   Time    ..........................................................................................................................................  Reviewed by Signature/Title    ...................................................              ..............................................  Date                                               Time          22EPIC Rev 08/18

## 2019-12-06 NOTE — DISCHARGE INSTRUCTIONS
Patient informed to follow up with primary care provider or concussion specialist in 3-5 days.   Work restrictions given to patient.     Patient to go to Emergency Room if symptoms worsen or change, worst headache of life, confusion, persistent vomiting, or new symptoms occur.     Tylenol and ibuprofen over the counter as needed for pain, ice, rest  No physical activity or sports until symptom free for 1 week and cleared by primary care provider or concussion specialist. Minimize driving, screen use, bright lights.     Increase fluids!  Patient voiced understanding of instructions given.
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
## 2019-12-11 DIAGNOSIS — F41.9 ANXIETY: ICD-10-CM

## 2019-12-11 RX ORDER — ESCITALOPRAM OXALATE 5 MG/1
TABLET ORAL
Qty: 30 TABLET | Refills: 1 | OUTPATIENT
Start: 2019-12-11

## 2019-12-11 NOTE — TELEPHONE ENCOUNTER
"ESCITALOPRAM 5 MG TABLET  Last Written Prescription Date:  11/12/19  Last Fill Quantity: 30,  # refills: 1   Last office visit: 11/12/2019 with prescribing provider:     Future Office Visit:    Requested Prescriptions   Pending Prescriptions Disp Refills     escitalopram (LEXAPRO) 5 MG tablet [Pharmacy Med Name: ESCITALOPRAM 5 MG TABLET] 30 tablet 1     Sig: TAKE 1 TABLET BY MOUTH EVERY DAY       SSRIs Protocol Passed - 12/11/2019  9:39 AM        Passed - Recent (12 mo) or future (30 days) visit within the authorizing provider's specialty     Patient has had an office visit with the authorizing provider or a provider within the authorizing providers department within the previous 12 mos or has a future within next 30 days. See \"Patient Info\" tab in inbasket, or \"Choose Columns\" in Meds & Orders section of the refill encounter.              Passed - Medication is active on med list        Passed - Patient is age 18 or older        Passed - No active pregnancy on record        Passed - No positive pregnancy test in last 12 months          " Date of Service: 05/02/2019    PREOPERATIVE DIAGNOSES:  1.  Left shoulder supraspinatus rotator cuff tear.  2.  Long head of biceps tendon tearing.  3.  Superior labral tearing.  4.  Anterior labral tearing.  5.  Impingement syndrome.  6.  Acromioclavicular osteoarthritis.    POSTOPERATIVE DIAGNOSES:  1.  Left shoulder supraspinatus rotator cuff tear.  2.  Long head of biceps tendon tearing.  3.  Superior labral tearing.  4.  Anterior labral tearing.  5.  Impingement syndrome.  6.  Acromioclavicular osteoarthritis.    OPERATION PERFORMED:  1.  Arthroscopic rotator cuff repair, supraspinatus, double row.  2.  Arthroscopic acromioplasty.  3.  Arthroscopic distal clavicle excision.  4.  Extensive debridement, left shoulder including debridement of anterior and superior labral tears and biceps tenotomy.    SURGEON:  Dayaan Bryant DO.    ASSISTANT:  Trudy Klein PA-C.    ANESTHESIA:  General plus interscalene block.    COMPLICATIONS:  None.    FLUIDS:  Lactated Ringers.    ESTIMATED BLOOD LOSS:  Minimal.    DISPOSITION:   The patient tolerated the procedure well, transferred to PACU in satisfactory condition.    BRIEF OPERATIVE INDICATIONS:  The patient is a 47-year-old female who complains of left shoulder pain for quite some time.  She is aware of risks and benefits, including continued pain, infection, need for further surgery.    OPERATIVE COURSE:  The patient was identified in the preoperative holding area.  The correct extremity was marked with initials.  The patient was transferred back to the operative suite where the left upper extremity was prepped and draped in usual fashion.  Standard posterior viewing portal was utilized.  An anterior portal was placed in the rotator cuff interval.  We tenotomized the biceps at its root.  We debrided the anterior and superior labrum down to stable tissues with a full radius shaver.  We tenotomized the biceps with a full radius shaver.  The subscapularis was intact.   Scope was then removed and placed in the subacromial space.  Full bursectomy was performed from a laterally established portal and the greater tuberosity was debrided down to bleeding bone with a full radius bur.  The bur was then used to co-plane the anterior acromion with the mid portion of the acromion to create acromioplasty.  We then used the unhooded 4 mm bur for a distance of 8 mm to excise the distal clavicle from the anterior portal.  This was done under direct visualization.  Wound was copiously irrigated.  Medial row anchor was driven in 4.75 SwiveLock double loaded with FiberTape and FiberWire.  The FiberTape was brought down to a lateral row.  The FiberWire was tied off for a watertight closure.  Wound was copiously irrigated.  Incisions were closed with 3-0 nylon, dressed with Xeroform, 4x4s, ABDs.  The patient tolerated the procedure well and was transferred to the PACU in satisfactory condition.    POSTOPERATIVE COURSE:  The patient will be in a sling for 6 sutures and should use Vicodin and Neurontin for pain control.      Dictated By: Dayana Bryant DO  Signing Provider: Dayana Bryant DO LP/jason ([id number])  DD: 05/02/2019 12:27:24 TD: 05/02/2019 12:36:41    Copy Sent To:

## 2019-12-17 ENCOUNTER — MYC REFILL (OUTPATIENT)
Dept: FAMILY MEDICINE | Facility: CLINIC | Age: 24
End: 2019-12-17

## 2019-12-17 DIAGNOSIS — F41.9 ANXIETY: ICD-10-CM

## 2019-12-17 RX ORDER — ESCITALOPRAM OXALATE 5 MG/1
5 TABLET ORAL DAILY
Qty: 30 TABLET | Refills: 1 | Status: CANCELLED | OUTPATIENT
Start: 2019-12-17

## 2020-01-08 DIAGNOSIS — F41.9 ANXIETY: ICD-10-CM

## 2020-01-08 RX ORDER — ESCITALOPRAM OXALATE 5 MG/1
TABLET ORAL
Qty: 30 TABLET | Refills: 1 | Status: SHIPPED | OUTPATIENT
Start: 2020-01-08 | End: 2020-01-20

## 2020-01-08 NOTE — TELEPHONE ENCOUNTER
"Requested Prescriptions   Pending Prescriptions Disp Refills     escitalopram (LEXAPRO) 5 MG tablet [Pharmacy Med Name: ESCITALOPRAM 5 MG TABLET] 30 tablet 1     Sig: TAKE 1 TABLET BY MOUTH EVERY DAY   Last Written Prescription Date:  12-17-19  Last Fill Quantity: 30,  # refills: 1   Last office visit: 11/12/2019 with prescribing provider:  11-12-19   Future Office Visit:      SSRIs Protocol Passed - 1/8/2020  7:31 AM        Passed - Recent (12 mo) or future (30 days) visit within the authorizing provider's specialty     Patient has had an office visit with the authorizing provider or a provider within the authorizing providers department within the previous 12 mos or has a future within next 30 days. See \"Patient Info\" tab in inbasket, or \"Choose Columns\" in Meds & Orders section of the refill encounter.              Passed - Medication is active on med list        Passed - Patient is age 18 or older        Passed - No active pregnancy on record        Passed - No positive pregnancy test in last 12 months        "

## 2020-01-08 NOTE — TELEPHONE ENCOUNTER
Prescription approved per [facility] Refill Protocol.  Patient due for follow up in February 2020.    Linda Mcclellan, RN, BSN, PHN

## 2020-01-17 NOTE — PROGRESS NOTES
Subjective     Mora Maldonado is a 24 year old female who presents to clinic today for the following health issues:    HPI   Hypothyroidism Follow-up      Since last visit, patient describes the following symptoms: dry skin, anxiety and depression  Not sure if lexapro is helping or not.  The last 2 antidepressants she was on made her feel numb/ like a robot.  Not feeling that way on this medication.  Would like to try increasing medication.  If thyroid labs low again, will discontinue levothyroxine.  Discussed how occasionally, thyroid function may return to normal, and may no longer need medication.     How many servings of fruits and vegetables do you eat daily?  4 or more    On average, how many sweetened beverages do you drink each day (Examples: soda, juice, sweet tea, etc.  Do NOT count diet or artificially sweetened beverages)?   0    How many days per week do you exercise enough to make your heart beat faster? 3 or less    How many minutes a day do you exercise enough to make your heart beat faster? 30 - 60    How many days per week do you miss taking your medication? 0        Patient Active Problem List   Diagnosis     Papanicolaou smear of cervix with low grade squamous intraepithelial lesion (LGSIL)     Hypothyroidism, unspecified type     Adjustment disorder with mixed anxiety and depressed mood     History reviewed. No pertinent surgical history.    Social History     Tobacco Use     Smoking status: Never Smoker     Smokeless tobacco: Never Used   Substance Use Topics     Alcohol use: Yes     Frequency: Monthly or less     Comment: once a month     Family History   Problem Relation Age of Onset     Rheumatoid Arthritis Mother      Thyroid Disease Maternal Grandmother          Current Outpatient Medications   Medication Sig Dispense Refill     escitalopram (LEXAPRO) 10 MG tablet Take 1 tablet (10 mg) by mouth daily 90 tablet 0     etonogestrel (IMPLANON/NEXPLANON) 68 MG IMPL Inject 68 mg Subcutaneous   "     levothyroxine (SYNTHROID/LEVOTHROID) 25 MCG tablet Take 2 tablets (50 mcg) by mouth daily 90 tablet 3     Allergies   Allergen Reactions     Amoxicillin Hives     Penicillins Hives     Recent Labs   Lab Test 11/12/19  1650 10/17/19  1435   A1C 4.9  --    LDL 62  --    HDL 32*  --    TRIG 195*  --    TSH 0.12* 0.52      BP Readings from Last 3 Encounters:   01/20/20 132/88   12/06/19 (!) 151/90   11/12/19 113/75    Wt Readings from Last 3 Encounters:   01/20/20 70.5 kg (155 lb 6.4 oz)   12/06/19 68.9 kg (152 lb)   11/12/19 72.2 kg (159 lb 3.2 oz)                    Reviewed and updated as needed this visit by Provider  Tobacco  Allergies  Meds  Problems  Med Hx  Surg Hx  Fam Hx         Review of Systems   ROS COMP: Constitutional, HEENT, cardiovascular, pulmonary, GI, , musculoskeletal, neuro, skin, endocrine and psych systems are negative, except as otherwise noted.      Objective    /88   Pulse 83   Temp 98.3  F (36.8  C) (Oral)   Resp 16   Ht 1.58 m (5' 2.21\")   Wt 70.5 kg (155 lb 6.4 oz)   LMP 01/09/2020   SpO2 100%   Breastfeeding No   BMI 28.24 kg/m    Body mass index is 28.24 kg/m .  Physical Exam   GENERAL: healthy, alert and no distress  NECK: no adenopathy, no asymmetry, masses, or scars and thyroid normal to palpation  RESP: lungs clear to auscultation - no rales, rhonchi or wheezes  CV: regular rate and rhythm, normal S1 S2, no S3 or S4, no murmur, click or rub, no peripheral edema and peripheral pulses strong  SKIN: no suspicious rashes  PSYCH: mentation appears normal, affect normal/bright    Diagnostic Test Results:  Labs reviewed in Epic  See orders        Assessment & Plan       ICD-10-CM    1. Hypothyroidism, unspecified type E03.9 TSH with free T4 reflex   2. Adjustment disorder with mixed anxiety and depressed mood F43.23 escitalopram (LEXAPRO) 10 MG tablet          See Patient Instructions    Return in about 3 months (around 4/20/2020), or if symptoms worsen or fail " to improve; let me know how you are feeling with medication changes.    Kyara Mckee, VICENTE  [facility]

## 2020-01-20 ENCOUNTER — OFFICE VISIT (OUTPATIENT)
Dept: FAMILY MEDICINE | Facility: CLINIC | Age: 25
End: 2020-01-20
Payer: COMMERCIAL

## 2020-01-20 VITALS
SYSTOLIC BLOOD PRESSURE: 132 MMHG | HEART RATE: 83 BPM | TEMPERATURE: 98.3 F | RESPIRATION RATE: 16 BRPM | OXYGEN SATURATION: 100 % | HEIGHT: 62 IN | DIASTOLIC BLOOD PRESSURE: 88 MMHG | BODY MASS INDEX: 28.6 KG/M2 | WEIGHT: 155.4 LBS

## 2020-01-20 DIAGNOSIS — F43.23 ADJUSTMENT DISORDER WITH MIXED ANXIETY AND DEPRESSED MOOD: ICD-10-CM

## 2020-01-20 DIAGNOSIS — E03.9 HYPOTHYROIDISM, UNSPECIFIED TYPE: Primary | ICD-10-CM

## 2020-01-20 LAB
T4 FREE SERPL-MCNC: 0.83 NG/DL (ref 0.76–1.46)
TSH SERPL DL<=0.005 MIU/L-ACNC: 23.12 MU/L (ref 0.4–4)

## 2020-01-20 PROCEDURE — 84439 ASSAY OF FREE THYROXINE: CPT | Performed by: NURSE PRACTITIONER

## 2020-01-20 PROCEDURE — 36415 COLL VENOUS BLD VENIPUNCTURE: CPT | Performed by: NURSE PRACTITIONER

## 2020-01-20 PROCEDURE — 84443 ASSAY THYROID STIM HORMONE: CPT | Performed by: NURSE PRACTITIONER

## 2020-01-20 PROCEDURE — 99214 OFFICE O/P EST MOD 30 MIN: CPT | Performed by: NURSE PRACTITIONER

## 2020-01-20 RX ORDER — ESCITALOPRAM OXALATE 10 MG/1
10 TABLET ORAL DAILY
Qty: 90 TABLET | Refills: 0 | Status: SHIPPED | OUTPATIENT
Start: 2020-01-20 | End: 2020-04-10

## 2020-01-20 ASSESSMENT — ANXIETY QUESTIONNAIRES
6. BECOMING EASILY ANNOYED OR IRRITABLE: NEARLY EVERY DAY
1. FEELING NERVOUS, ANXIOUS, OR ON EDGE: NEARLY EVERY DAY
2. NOT BEING ABLE TO STOP OR CONTROL WORRYING: NEARLY EVERY DAY
7. FEELING AFRAID AS IF SOMETHING AWFUL MIGHT HAPPEN: NEARLY EVERY DAY
IF YOU CHECKED OFF ANY PROBLEMS ON THIS QUESTIONNAIRE, HOW DIFFICULT HAVE THESE PROBLEMS MADE IT FOR YOU TO DO YOUR WORK, TAKE CARE OF THINGS AT HOME, OR GET ALONG WITH OTHER PEOPLE: VERY DIFFICULT
GAD7 TOTAL SCORE: 21
3. WORRYING TOO MUCH ABOUT DIFFERENT THINGS: NEARLY EVERY DAY
5. BEING SO RESTLESS THAT IT IS HARD TO SIT STILL: NEARLY EVERY DAY

## 2020-01-20 ASSESSMENT — PATIENT HEALTH QUESTIONNAIRE - PHQ9
5. POOR APPETITE OR OVEREATING: NEARLY EVERY DAY
SUM OF ALL RESPONSES TO PHQ QUESTIONS 1-9: 18

## 2020-01-20 ASSESSMENT — MIFFLIN-ST. JEOR: SCORE: 1401.39

## 2020-01-21 ASSESSMENT — ANXIETY QUESTIONNAIRES: GAD7 TOTAL SCORE: 21

## 2020-01-24 ENCOUNTER — MYC MEDICAL ADVICE (OUTPATIENT)
Dept: FAMILY MEDICINE | Facility: CLINIC | Age: 25
End: 2020-01-24

## 2020-01-24 DIAGNOSIS — E03.9 HYPOTHYROIDISM, UNSPECIFIED TYPE: Primary | ICD-10-CM

## 2020-01-24 DIAGNOSIS — E03.9 HYPOTHYROIDISM, UNSPECIFIED TYPE: ICD-10-CM

## 2020-01-24 RX ORDER — LEVOTHYROXINE SODIUM 88 UG/1
88 TABLET ORAL DAILY
Qty: 90 TABLET | Refills: 0 | Status: SHIPPED | OUTPATIENT
Start: 2020-01-24 | End: 2020-04-10

## 2020-01-24 NOTE — RESULT ENCOUNTER NOTE
Brandyn Velazco,    Thank you for your recent office visit.    Here are your recent results.  Your TSH went from being very low 2 months ago, to high.  I have increased your levothyroxine dose, we should recheck labs in 2 months- call the below number to schedule a lab only visit. Do you want a referral for endocrinology? Or do you want to keep doing the every 2 months labs and med adjustment until normal?    Feel free to contact me via [employer] or call the clinic at [phone].    Sincerely,    SHAWNEE Gallegos, FNP-BC

## 2020-01-28 ENCOUNTER — OFFICE VISIT (OUTPATIENT)
Dept: FAMILY MEDICINE | Facility: CLINIC | Age: 25
End: 2020-01-28
Payer: COMMERCIAL

## 2020-01-28 VITALS
TEMPERATURE: 97.7 F | DIASTOLIC BLOOD PRESSURE: 82 MMHG | WEIGHT: 154 LBS | HEART RATE: 81 BPM | HEIGHT: 62 IN | OXYGEN SATURATION: 99 % | SYSTOLIC BLOOD PRESSURE: 118 MMHG | RESPIRATION RATE: 20 BRPM | BODY MASS INDEX: 28.34 KG/M2

## 2020-01-28 DIAGNOSIS — R07.0 THROAT PAIN: Primary | ICD-10-CM

## 2020-01-28 LAB
DEPRECATED S PYO AG THROAT QL EIA: NORMAL
SPECIMEN SOURCE: NORMAL

## 2020-01-28 PROCEDURE — 87880 STREP A ASSAY W/OPTIC: CPT | Performed by: FAMILY MEDICINE

## 2020-01-28 PROCEDURE — 87081 CULTURE SCREEN ONLY: CPT | Performed by: FAMILY MEDICINE

## 2020-01-28 PROCEDURE — 99213 OFFICE O/P EST LOW 20 MIN: CPT | Performed by: FAMILY MEDICINE

## 2020-01-28 ASSESSMENT — PAIN SCALES - GENERAL: PAINLEVEL: NO PAIN (0)

## 2020-01-28 ASSESSMENT — MIFFLIN-ST. JEOR: SCORE: 1391.79

## 2020-01-28 NOTE — PATIENT INSTRUCTIONS
Symptomatic treatment recommended including: salt water gargles, Chloraseptic spray, Cepacol lozenges, acetominophen, and ibuprofen.

## 2020-01-28 NOTE — PROGRESS NOTES
SUBJECTIVE:  Mora Maldonado is a 24 year old female who presents to clinic with a chief complaint of a sore throat that started 1 day(s) ago.  The patient described the pain or symptoms as moderate.  The patient reports that in addition to the sore throat she has symptoms that include:headaches and rhinnorhea    The patient (or a parent) denies any fever.    She(or a parent) denies exposure to Strep at school or work but she works at a [employer]  She (or a parent) denies a history of mononucleosis and denies any recent exposure to mononucleosis.     Past Medical History:   Diagnosis Date     Abnormal Pap smear of cervix 09/03/2019    See problem list     Thyroid disease      Current Outpatient Medications   Medication Sig Dispense Refill     escitalopram (LEXAPRO) 10 MG tablet Take 1 tablet (10 mg) by mouth daily 90 tablet 0     etonogestrel (IMPLANON/NEXPLANON) 68 MG IMPL Inject 68 mg Subcutaneous       levothyroxine (SYNTHROID/LEVOTHROID) 88 MCG tablet Take 1 tablet (88 mcg) by mouth daily 90 tablet 0     Social History     Tobacco Use     Smoking status: Never Smoker     Smokeless tobacco: Never Used   Substance Use Topics     Alcohol use: Yes     Frequency: Monthly or less     Comment: once a month       ROS:  Review of systems negative except as stated above.    OBJECTIVE:   LMP 01/09/2020       GENERAL APPEARANCE: healthy, alert and no distress  EYES: EOMI,  PERRL, conjunctiva clear  HENT: ear canals and TM's normal.  Nose normal.  Pharynx erythematous with no exudate noted.  NECK: supple, non-tender to palpation, no adenopathy noted  RESP: lungs clear to auscultation - no rales, rhonchi or wheezes  CV: regular rates and rhythm, normal S1 S2, no murmur noted  ABDOMEN:  soft, nontender, no HSM or masses and bowel sounds normal  SKIN: no suspicious lesions or rashes      Results for orders placed or performed in visit on 01/28/20   Strep, Rapid Screen     Status: None   Result Value Ref Range     Specimen Description Throat     Rapid Strep A Screen       NEGATIVE: No Group A streptococcal antigen detected by immunoassay, await culture report.         ASSESSMENT:  Viral pharyngitis    PLAN:   See orders in epic.     Symptomatic treatment recommended including: salt water gargles, Chloraseptic spray, Cepacol lozenges, acetominophen, and ibuprofen.   Follow-up in 2 weeks from the onset of symptoms if the sore throat is not improving.

## 2020-01-29 ENCOUNTER — OFFICE VISIT (OUTPATIENT)
Dept: ENDOCRINOLOGY | Facility: CLINIC | Age: 25
End: 2020-01-29
Attending: NURSE PRACTITIONER
Payer: COMMERCIAL

## 2020-01-29 VITALS
BODY MASS INDEX: 28.71 KG/M2 | OXYGEN SATURATION: 100 % | WEIGHT: 156 LBS | TEMPERATURE: 98.1 F | DIASTOLIC BLOOD PRESSURE: 72 MMHG | HEIGHT: 62 IN | RESPIRATION RATE: 14 BRPM | SYSTOLIC BLOOD PRESSURE: 108 MMHG | HEART RATE: 75 BPM

## 2020-01-29 DIAGNOSIS — E03.9 HYPOTHYROIDISM, UNSPECIFIED TYPE: Primary | ICD-10-CM

## 2020-01-29 LAB
BACTERIA SPEC CULT: NORMAL
SPECIMEN SOURCE: NORMAL

## 2020-01-29 PROCEDURE — 99204 OFFICE O/P NEW MOD 45 MIN: CPT | Performed by: INTERNAL MEDICINE

## 2020-01-29 ASSESSMENT — MIFFLIN-ST. JEOR: SCORE: 1404.19

## 2020-01-29 NOTE — PROGRESS NOTES
"CC: Hypothyroidism.     HPI: Patient here for evaluation of hypothyroidism.   She went to see her primary care in the summer of 2019 because of trouble losing weight, fatigue, and hair loss.   She was started on 25 mcg levothyroxine daily.   Then increased to 50 and then 100 mcg.   After labs in 11/2019 showed low TSH, her dose was dropped to 88 mcg daily.     She has also been started on lexapro.   She has had Nexplanon for birth control for several years.     Hair has become thicker but is still dry.     She takes the levothyroxine in the AM with her lexapro, 30 minutes before eating.   Denies missing any doses.     Sometimes with drink Herbalife.     She was taking biotin but comments she stopped it before her 11/2019 labs.   No iodine or kelp.     Sleeping 10 hours a night and not refreshed in the AM. Tossing and turning. No snoring.     She was having constipation 1 month ago which improved with having more fiber in her diet.     ROS: 10 point ROS neg other than the symptoms noted above in the HPI.    PMH:   Patient Active Problem List   Diagnosis     Papanicolaou smear of cervix with low grade squamous intraepithelial lesion (LGSIL)     Hypothyroidism, unspecified type     Adjustment disorder with mixed anxiety and depressed mood     Meds:  Current Outpatient Medications   Medication     escitalopram (LEXAPRO) 10 MG tablet     etonogestrel (IMPLANON/NEXPLANON) 68 MG IMPL     levothyroxine (SYNTHROID/LEVOTHROID) 88 MCG tablet     No current facility-administered medications for this visit.      FHX:   Grandmother has thyroid cancer.     SHX:  Works as  and behavior teacher.   No children.   Non-smoker.     Exam:   Vital signs:  Temp: 98.1  F (36.7  C) Temp src: Oral BP: 108/72 Pulse: 75   Resp: 14 SpO2: 100 %     Height: 158 cm (5' 2.21\") Weight: 70.8 kg (156 lb)  Estimated body mass index is 28.34 kg/m  as calculated from the following:    Height as of this encounter: 1.58 m (5' 2.21\").    Weight " as of this encounter: 70.8 kg (156 lb).  Gen: In NAD.   HEENT: no proptosis or lid lag, EOMI, thyroid palpable w/o clear nodule.   Card: S1 S2 RRR no m/r/g. no LE edema.   Pulm: CTA b/l.   GI: NT ND +BS.   MSK: no gross deformities.   Derm: no rashes or lesions.   Neuro: no tremor, +2 DTR's.     A/P:   Hypothyroidism - Extensive discussion of thyroid hormone and normal physiology. Included was discussion of thyroid in  relation to weight and energy. She has had trouble getting a consistent TSH level. She has been compliant with taking medication. While she is taking her levothyroxine with lexapro which can its absorption, this has been a consistent pattern. Based on weight, her full replacement dose would be 114 mcg daily. She was taking biotin close to her 11/2019 labs and during her 10/2019 which could have caused a falsely low reading.       -Take 1 tablet daily of the 88 mcg levothyroxine and 2 tablets on Sundays. A total of 8 tabs a week.   -Labs in 4 weeks.     Stephan Robert MD on 1/29/2020 at 3:42 PM

## 2020-01-29 NOTE — LETTER
1/29/2020         RE: Mora Maldonado  [address] [facility] [id number]        Dear Colleague,    Thank you for referring your patient, Mora Maldonado, to the [facility]. Please see a copy of my visit note below.    CC: Hypothyroidism.     HPI: Patient here for evaluation of hypothyroidism.   She went to see her primary care in the summer of 2019 because of trouble losing weight, fatigue, and hair loss.   She was started on 25 mcg levothyroxine daily.   Then increased to 50 and then 100 mcg.   After labs in 11/2019 showed low TSH, her dose was dropped to 88 mcg daily.     She has also been started on lexapro.   She has had Nexplanon for birth control for several years.     Hair has become thicker but is still dry.     She takes the levothyroxine in the AM with her lexapro, 30 minutes before eating.   Denies missing any doses.     Sometimes with drink Herbalife.     She was taking biotin but comments she stopped it before her 11/2019 labs.   No iodine or kelp.     Sleeping 10 hours a night and not refreshed in the AM. Tossing and turning. No snoring.     She was having constipation 1 month ago which improved with having more fiber in her diet.     ROS: 10 point ROS neg other than the symptoms noted above in the HPI.    PMH:   Patient Active Problem List   Diagnosis     Papanicolaou smear of cervix with low grade squamous intraepithelial lesion (LGSIL)     Hypothyroidism, unspecified type     Adjustment disorder with mixed anxiety and depressed mood     Meds:  Current Outpatient Medications   Medication     escitalopram (LEXAPRO) 10 MG tablet     etonogestrel (IMPLANON/NEXPLANON) 68 MG IMPL     levothyroxine (SYNTHROID/LEVOTHROID) 88 MCG tablet     No current facility-administered medications for this visit.      FHX:   Grandmother has thyroid cancer.     SHX:  Works as  and behavior teacher.   No children.   Non-smoker.     Exam:   Vital signs:  Temp: 98.1  F (36.7  C) Temp  "src: Oral BP: 108/72 Pulse: 75   Resp: 14 SpO2: 100 %     Height: 158 cm (5' 2.21\") Weight: 70.8 kg (156 lb)  Estimated body mass index is 28.34 kg/m  as calculated from the following:    Height as of this encounter: 1.58 m (5' 2.21\").    Weight as of this encounter: 70.8 kg (156 lb).  Gen: In NAD.   HEENT: no proptosis or lid lag, EOMI, thyroid palpable w/o clear nodule.   Card: S1 S2 RRR no m/r/g. no LE edema.   Pulm: CTA b/l.   GI: NT ND +BS.   MSK: no gross deformities.   Derm: no rashes or lesions.   Neuro: no tremor, +2 DTR's.     A/P:   Hypothyroidism - Extensive discussion of thyroid hormone and normal physiology. Included was discussion of thyroid in  relation to weight and energy. She has had trouble getting a consistent TSH level. She has been compliant with taking medication. While she is taking her levothyroxine with lexapro which can its absorption, this has been a consistent pattern. Based on weight, her full replacement dose would be 114 mcg daily. She was taking biotin close to her 11/2019 labs and during her 10/2019 which could have caused a falsely low reading.       -Take 1 tablet daily of the 88 mcg levothyroxine and 2 tablets on Sundays. A total of 8 tabs a week.   -Labs in 4 weeks.     Stephan Robert MD on 1/29/2020 at 3:42 PM          Again, thank you for allowing me to participate in the care of your patient.        Sincerely,        Stephan Robert MD    "

## 2020-01-29 NOTE — NURSING NOTE
"Chief Complaint   Patient presents with     Thyroid Problem     hypothyroidism       Initial /72 (BP Location: Right arm, Patient Position: Sitting, Cuff Size: Adult Regular)   Pulse 75   Temp 98.1  F (36.7  C) (Oral)   Resp 14   Ht 1.58 m (5' 2.21\")   Wt 70.8 kg (156 lb)   LMP 01/09/2020   SpO2 100%   BMI 28.34 kg/m   Estimated body mass index is 28.34 kg/m  as calculated from the following:    Height as of this encounter: 1.58 m (5' 2.21\").    Weight as of this encounter: 70.8 kg (156 lb).  BP completed using cuff size: regular  Medications and allergies reviewed.      Chhaya HARRY MA    "

## 2020-01-29 NOTE — PATIENT INSTRUCTIONS
-Take 1 tablet daily of the 88 mcg levothyroxine and 2 tablets on Sundays. A total of 8 tabs a week.   -Labs in 4 weeks.

## 2020-02-06 ENCOUNTER — TELEPHONE (OUTPATIENT)
Dept: FAMILY MEDICINE | Facility: CLINIC | Age: 25
End: 2020-02-06

## 2020-02-06 ENCOUNTER — NURSE TRIAGE (OUTPATIENT)
Dept: NURSING | Facility: CLINIC | Age: 25
End: 2020-02-06

## 2020-02-06 NOTE — TELEPHONE ENCOUNTER
"Mora reports that her \"mood is all over the place,\" since her levothyroxine and Lexapro doses have been increased last week. Currently on levothyroxine 88 mcg daily (Mon-Sat), and 176 mcg every Sundays. Lexapro has been increased from 5 mg to 10 mg daily. States that she has been:  1) crying for no reason  2) lack of appetite lately  3) feeling more depressed and anxious    Per protocol, advised to call clinic tomorrow 2/7/20. Care advice reviewed. Patient verbalizes understanding. Advised to call back with further questions/concerns.     To clinic: please call her at [phone] for any updates.    Juanita Gomez RN/[place] Nurse Advisor        Reason for Disposition    Taking thyroid medications    Additional Information    Negative: Severe difficulty breathing (e.g., struggling for each breath, speaks in single words)    Negative: Bluish (or gray) lips or face now    Negative: Difficult to awaken or acting confused (e.g., disoriented, slurred speech)    Negative: Hysterical or combative behavior    Negative: Sounds like a life-threatening emergency to the triager    Negative: [1] Difficulty breathing AND [2] persists > 10 minutes AND [3] not relieved by reassurance provided by triager    Negative: [1] Lightheadedness or dizziness AND [2] persists > 10 minutes AND [3] not relieved by reassurance provided by triager    Negative: [1] Known or suspected alcohol or drug abuse AND [2] feeling very shaky (i.e., visible tremors of hands)    Negative: Patient sounds very sick or weak to the triager    Negative: Symptoms interfere with work or school    Negative: Requesting to talk to a counselor (e.g., mental health worker, psychiatrist)    Negative: Patient sounds very upset or troubled to the triager    Negative: [1] Symptoms of anxiety or panic AND [2] has not been evaluated for this by physician    Negative: [1] Started on anti-anxiety medication AND [2] no relief    Negative: [1] Significant weight loss (or " gain) AND [2] not dieting    Protocols used: ANXIETY AND PANIC ATTACK-A-AH

## 2020-02-06 NOTE — TELEPHONE ENCOUNTER
"Clinic Action Needed: Yes, call back  [facility] Triage Call  Presenting Problem:    Mora reports that her \"mood is all over the place,\" since her levothyroxine and Lexapro doses have been increased last week. Currently on levothyroxine 88 mcg daily (Mon-Sat), and 176 mcg every Sundays. Lexapro has been increased from 5 mg to 10 mg daily. States that she has been:  1) crying for no reason  2) lack of appetite lately  3) feeling more depressed and anxious    Per protocol, advised to call clinic tomorrow 2/7/20. Care advice reviewed. Patient verbalizes understanding. Advised to call back with further questions/concerns.     To clinic: please call her at [phone] for any updates.    Juanita Gomez RN/[place] Nurse Advisor      Guideline Used:  Protocols used: ANXIETY AND PANIC ATTACK-A-AH    Routed to: RN Pool    Please be sure to close this encounter once this patient's issue/question has been addressed.        "

## 2020-02-07 NOTE — TELEPHONE ENCOUNTER
Left message on voice mail for patient to call clinic. [phone]    Radha Mota RN BSN

## 2020-02-07 NOTE — TELEPHONE ENCOUNTER
Sometimes it takes a few weeks to adjust to new medication doses, however if she wants, she can break her 10 mg tablets of lexapro in half.  I do not think it is related to her thyroid as we only slightly increased her levothyroxine and this takes awhile to increase in your body. Follow up in clinic if needed. Kyara Mckee, [place], FNP-BC

## 2020-02-18 ENCOUNTER — MYC MEDICAL ADVICE (OUTPATIENT)
Dept: FAMILY MEDICINE | Facility: CLINIC | Age: 25
End: 2020-02-18

## 2020-02-18 ENCOUNTER — TELEPHONE (OUTPATIENT)
Dept: FAMILY MEDICINE | Facility: CLINIC | Age: 25
End: 2020-02-18

## 2020-02-25 ENCOUNTER — MYC MEDICAL ADVICE (OUTPATIENT)
Dept: FAMILY MEDICINE | Facility: CLINIC | Age: 25
End: 2020-02-25

## 2020-03-15 ENCOUNTER — VIRTUAL VISIT (OUTPATIENT)
Dept: FAMILY MEDICINE | Facility: OTHER | Age: 25
End: 2020-03-15

## 2020-03-15 NOTE — PROGRESS NOTES
"Date: 03/15/2020 13:36:06  Clinician: Susan Wang  Clinician NPI: [id number]  Patient: Mora Maldonado  Patient : 1995  Patient Address:  [place] [address]  Patient Phone: [phone]  Visit Protocol: URI  Patient Summary:  Mora is a 24 year old ( : 1995 ) female who initiated a Visit for COVID-19 (Coronavirus) evaluation and screening. When asked the question \"Please sign me up to receive news, health information and promotions from [employer].\", Mora responded \"No\".    Mora states her symptoms started gradually 3-6 days ago.   Her symptoms consist of ear pain, malaise, a headache, rhinitis, myalgia, chills, wheezing, a sore throat, a cough, nasal congestion, and tooth pain. She is experiencing mild difficulty breathing with activities but can speak normally in full sentences.   Symptom details     Nasal secretions: The color of her mucus is clear.    Cough: Mora coughs almost every minute and her cough is more bothersome at night. Phlegm comes into her throat when she coughs. She does not believe her cough is caused by post-nasal drip. The color of the phlegm is clear.     Sore throat: Mora reports having severe throat pain (7-9 on a 10 point pain scale), does not have exudate on her tonsils, and can swallow liquids. She is not sure if the lymph nodes in her neck are enlarged. A rash has not appeared on the skin since the sore throat started.     Wheezing: Mora has been diagnosed with asthma. The wheezing interferes with her normal daily activities.    Headache: She states the headache is mild (1-3 on a 10 point pain scale).     Tooth pain: The tooth pain is not caused by a cavity, recent dental work, or other mouth problems.      Mora denies having fever and facial pain or pressure. She also denies having recent facial or sinus surgery in the past 60 days, double sickening (worsening symptoms after initial improvement), and " taking antibiotic medication for the symptoms.   Precipitating events  Within the past week, Mora has not been exposed to someone with strep throat. She has not recently been exposed to someone with influenza. Mora has been in close contact with the following high risk individuals: immunocompromised people.   Pertinent COVID-19 (Coronavirus) information  Mora has not traveled internationally or to the areas where COVID-19 (Coronavirus) is widespread in the last 14 days before the start of her symptoms.   Mora has not had close contact with a suspected or laboratory-confirmed COVID-19 patient within 14 days of symptom onset.   Mora is not a healthcare worker and does not work in a healthcare facility.   Triage Point(s) temporarily suspended for COVID-19 (Coronavirus) screening  Mora reported the following symptoms which were previously protocol referral points. These protocol referral points have temporarily been removed for purposes of COVID-19 (Coronavirus) screening.   Wheezing that keeps Mora from doing daily activities   Pertinent medical history  Mora does not get yeast infections when she takes antibiotics.   Mora does not need a return to work/school note.   Weight: 150 lbs   Mora does not smoke or use smokeless tobacco.   She denies pregnancy and denies breastfeeding. She has menstruated in the past month.   Additional information as reported by the patient (free text): My cough has been really dry but moist at the same time.   Weight: 150 lbs    MEDICATIONS: escitalopram oxalate oral, levothyroxine oral, ALLERGIES: Penicillins, amoxicillin  Clinician Response:  Dear Mora,  Based on the information provided, you have a viral upper respiratory infection, otherwise known as a cold. Symptoms vary from person to person, but can include sneezing, coughing, a runny nose, sore throat, and headache and range from mild to severe.  Unfortunately, there  are no medications that can cure a cold, so treatment is focused on controlling symptoms as much as possible. Most people gradually feel better until symptoms are gone in 1-2 weeks.  Medication information  Because you have a viral infection, antibiotics will not help you get better. Treating a viral infection with antibiotics could actually make you feel worse.  I am prescribing:     Ventolin HFA 90 mcg/actuation aerosol inhaler. Inhale 2 puffs every 4-6 hours as needed for 5 days. There are no refills with this prescription.   Unless you are allergic to the over-the-counter medication(s) below, I recommend using:       Acetaminophen (Tylenol or store brand) oral tablet. Take 1-2 tablets by mouth every 4-6 hours to help with the discomfort.      Ibuprofen (Advil or store brand) 200 mg oral tablet. Take 1-3 tablets (200-600 mg) by mouth every 8 hours to help with the discomfort. Make sure to take the ibuprofen with food. Do not exceed 2400 mg in 24 hours.    A decongestant such as Sudafed PE or store brand.      Dextromethorphan (Robitussin DM or store brand). This medication is a cough suppressant that works by decreasing the feeling of needing to cough. Please follow the instructions on the package.     Over-the-counter medications do not require a prescription. Ask the pharmacist if you have any questions.  Self care  The following tips will keep you as comfortable as possible while you recover:     Rest    Drink plenty of water and other liquids    Take a hot shower to loosen congestion    Use throat lozenges    Gargle with warm salt water (1/4 teaspoon of salt per 8 ounce glass of water)    Suck on frozen items such as popsicles or ice cubes    Drink hot tea with lemon and honey    Take a spoonful of honey to reduce your cough     When to seek care  Please be seen in a clinic or urgent care if new symptoms develop, or symptoms become worse.  Call 911 or go to the emergency room if you feel that your throat is  closing off, you suddenly develop a rash, you are unable to swallow fluids, you are drooling, or you are having difficulty breathing.  Additional treatment plan   Dear Mora,  Based on the information you have provided, it does not appear you need Coronavirus (COVID-19) testing.   At this time, we recommend testing primarily for those people who have symptoms of cough and fever and have either traveled to a known area of infection or have been exposed to someone with laboratory confirmed Coronavirus by close contact.   Coronavirus - General Information:   The coronavirus infection starts within 14 days of an exposure.  Symptoms are those of a respiratory infection (such as fever, cough).   If you have not had symptoms by day 15, you should be considered uninfected by coronavirus.   Coronavirus - Symptoms:    The coronavirus can cause a respiratory illness, such as bronchitis or pneumonia.  The most common symptoms are: cough, fever, and shortness of breath.   Other symptoms are: body aches, chills, diarrhea, fatigue, headache, runny nose, and sore throat   Coronavirus - Exposure Risk Factors:   Exposure to a person who has been diagnosed with coronavirus.  Travel from an area with recent local transmission of coronavirus.  The CDC (www.cdc.gov) has the most up-to-date list of where the coronavirus outbreak is occurring.   Coronavirus - Spreading:    The virus likely spreads through respiratory droplets produced when a person coughs or sneezes. These respiratory droplets can travel approximately 6 feet and can remain on surfaces. Common disinfectants will kill the virus.  The CDC currently does not recommend healthy people wear masks.   Coronavirus - Protect Yourself:    Avoid close contact with people known to have this new coronavirus infection.  Wash hands often with soap and water or alcohol-based hand .  Avoid touching the eyes, nose or mouth.   Thank you for limiting contact with others, wearing a  simple mask to cover your cough, practice good hand hygiene habits and accessing our virtual services where possible to limit the spread of this virus.  For more information about COVID19 and options for caring for yourself at home, please visit the CDC website at https://www.cdc.gov/coronavirus/2019-ncov/about/steps-when-sick.html   For more options for care at [facility], please visit our website at [email or web address]     Diagnosis: Cough  Diagnosis ICD: R05  Prescription: Ventolin HFA 90 mcg/actuation inhalation HFA aerosol inhaler 1 200 inhalation canister, 5 days supply. Inhale 2 puffs every 4-6 hours as needed for 5 days. Refills: 0, Refill as needed: no, Allow substitutions: yes

## 2020-04-07 ENCOUNTER — TELEPHONE (OUTPATIENT)
Dept: FAMILY MEDICINE | Facility: CLINIC | Age: 25
End: 2020-04-07

## 2020-04-07 NOTE — TELEPHONE ENCOUNTER
Patient has telephone visit with Dr. Garcia April 10. Can we try to get her in for PVL before her appointment.  Su Holly, cma

## 2020-04-07 NOTE — TELEPHONE ENCOUNTER
It looks like she already has thyroid labs ordered by Dr Robert so there is no need to have me involved with those.     She certainly could have a telephone visit with me regarding GERD.     Please call the patient regarding this    Brad Garcia MD

## 2020-04-09 NOTE — PROGRESS NOTES
"Subjective     Mora Maldonado is a 25 year old female who is being evaluated via a billable telephone visit.      The patient has been notified of following:     \"This telephone visit will be conducted via a call between you and your physician/provider. We have found that certain health care needs can be provided without the need for a physical exam.  This service lets us provide the care you need with a short phone conversation.  If a prescription is necessary we can send it directly to your pharmacy.  If lab work is needed we can place an order for that and you can then stop by our lab to have the test done at a later time.    Telephone visits are billed at different rates depending on your insurance coverage. During this emergency period, for some insurers they may be billed the same as an in-person visit.  Please reach out to your insurance provider with any questions.    If during the course of the call the physician/provider feels a telephone visit is not appropriate, you will not be charged for this service.\"    Patient has given verbal consent for Telephone visit?  Yes        Mora Maldonado complains of   Chief Complaint   Patient presents with     Thyroid Disease     Establish Care     Gastrophageal Reflux     Reflux never been seen for this. Dairy and fattening foods. Heart burn. Chest pain little pain down left arm on and off. Was up all night with it last Friday. Taking conrad.    ALLERGIES  Amoxicillin and Penicillins        Reviewed and updated as needed this visit by Provider         Review of Systems   ROS COMP:        Objective   Reported vitals:  There were no vitals taken for this visit.   healthy, alert and no distress  Psych: Alert and oriented times 3; coherent speech, normal   rate and volume, able to articulate logical thoughts, able   to abstract reason, no tangential thoughts, no hallucinations   or delusions  Her affect is calm and pleasant     Diagnostic Test Results:  Labs reviewed in " Epic        Assessment/Plan:    (K21.9) Gastroesophageal reflux disease, esophagitis presence not specified  (primary encounter diagnosis)  Comment:   Plan: omeprazole (PRILOSEC) 40 MG DR capsule            (F43.23) Adjustment disorder with mixed anxiety and depressed mood  Comment:   Plan: escitalopram (LEXAPRO) 20 MG tablet            (E03.9) Hypothyroidism, unspecified type  Comment:   Plan: levothyroxine (SYNTHROID/LEVOTHROID) 88 MCG         tablet            No follow-ups on file.      Phone call duration:  18 minutes    Brad Garcia MD  --------------------------------------------------------------------------------------------------------------------------------------  SUBJECTIVE:  Mora Maldonado is a 25 year old female who presents for a follow up evaluation of depression. The patient was started on Lexapro (escitalopram) 10 mg daily at the last visit which was 8 weeks ago.  The patient reports that her symptoms have improved since starting this medication.         The patient reports that she is not having any side effects from her current medication.      Current Outpatient Medications   Medication     calcium carbonate (TUMS) 500 MG chewable tablet     escitalopram (LEXAPRO) 10 MG tablet     etonogestrel (IMPLANON/NEXPLANON) 68 MG IMPL     levothyroxine (SYNTHROID/LEVOTHROID) 88 MCG tablet     No current facility-administered medications for this visit.              Current thoughts of suicide or homicide:No    Last PHQ-9 score on record= 9    PHQ-9 SCORE 11/12/2019 1/20/2020 4/10/2020   PHQ-9 Total Score 19 18 9            OBJECTIVE:  General: the patient had a calm affect during the visit today.    ASSESSMENT: Acute Depression which has improved     Depression risk factors: possible organic causes of depression: hypothyroidism.    PLAN:    We will increase the dose of the Lexapro (escitalopram) 10 mg daily to 20 mg.    I asked the patient to return to clinic for appointment in 4 weeks for  reevaluation.    The patient was advised of the potential side effects of the medication and was asked to call if any of them persist past 7 days of starting it or starting on a new dose.      --------------------------------------------------------------------------------------------------------------------------------------  SUBJECTIVE:  HPI: Mora Maldonado is a 25 year old female who presents for follow up on symptoms of:    Every time that she eats she gets phleghm in her throat and starts couging  She frequently burps and gets an upset stomach and burning in her chest     This has been going on since her childhood    Itusually lasts 1-2 hours    It happens after eating fatty foods or cheese or tomato sauce      She drinks 3 shots of expresso with 2 cold brews a day(s)     She reports that it is hard to lose weight with her thyroid and she is trying to eat better.       Allergies as of 04/10/2020 - Reviewed 04/10/2020   Allergen Reaction Noted     Amoxicillin Hives 08/13/2019     Penicillins Hives 08/13/2019       Current Outpatient Medications:      calcium carbonate (TUMS) 500 MG chewable tablet, Take 4 chew tab by mouth 2 times daily, Disp: , Rfl:      escitalopram (LEXAPRO) 10 MG tablet, Take 1 tablet (10 mg) by mouth daily, Disp: 90 tablet, Rfl: 0     etonogestrel (IMPLANON/NEXPLANON) 68 MG IMPL, Inject 68 mg Subcutaneous, Disp: , Rfl:      levothyroxine (SYNTHROID/LEVOTHROID) 88 MCG tablet, Take 1 tablet (88 mcg) by mouth daily, Disp: 90 tablet, Rfl: 0  Allergies as of    Diagnosis     Papanicolaou smear of cervix with low grade squamous intraepithelial lesion (LGSIL)     Hypothyroidism, unspecified type     Adjustment disorder with mixed anxiety and depressed mood         PHYSICAL EXAM:  There were no vitals taken for this visit.   General: healthy, alert and no distress      ASSESSMENT / IMPRESSION:  1)Gastroesophageal Reflux Disease  2)     PLAN:  Start omeprazole 40 mg daily  Continue current life  style changes including: eat smaller, more frequent meals, last meal at least 3 hours before bedtime and avoid chocolate, citrus, alcohol, caffeine, and peppermint  I did recommend that the patient make sure to get the recommended daily dose of calcium either through dietary sources or through supplement to compensate for the decreased calcium absorption seen in patients on long term PPI's.   The patient should follow up as needed or Follow up in 1 month.  --------------------------------------------------------------------------------------------------------------------------------------    HPI:  Mora Maldonado is a 25 year old female presenting for follow up evaluation of hypothyroidism. She has just seen Dr Robert in endocrine and had her medication changed. She is currently taking 88 mcg of Synthroid, Levoxyl (Levothyroxine) daily and 2 tabs on Sunday.  She  has been taking this dose for about 2 months.    The patient reports symptoms of hypothyroidism. The patient does reports having symptoms of difficulty shedding weight    Patient Active Problem List   Diagnosis     Papanicolaou smear of cervix with low grade squamous intraepithelial lesion (LGSIL)     Hypothyroidism, unspecified type     Adjustment disorder with mixed anxiety and depressed mood       Patient Active Problem List 04/10/2020 - Reviewed 04/10/2020   -- AMOXICILLIN -- Hives -- noted 08/13/2019   -- PENICILLINS -- Hives -- noted 08/13/2019    Current Outpatient Medications   Medication     calcium carbonate (TUMS) 500 MG chewable tablet     escitalopram (LEXAPRO) 10 MG tablet     etonogestrel (IMPLANON/NEXPLANON) 68 MG IMPL     levothyroxine (SYNTHROID/LEVOTHROID) 88 MCG tablet     No current facility-administered medications for this visit.        OBJECTIVE:   Exam:  There were no vitals taken for this visit.      TSH   Date Value Ref Range Status   01/20/2020 23.12 (H) 0.40 - 4.00 mU/L Final   ]      ASSESSMENT / PLAN  1) Hypothyroidism:  labs were already ordered by Dr Robert and I urged the patient to make a laboratory appointment(s) as soon as possible.

## 2020-04-10 ENCOUNTER — VIRTUAL VISIT (OUTPATIENT)
Dept: FAMILY MEDICINE | Facility: CLINIC | Age: 25
End: 2020-04-10
Payer: COMMERCIAL

## 2020-04-10 DIAGNOSIS — F43.23 ADJUSTMENT DISORDER WITH MIXED ANXIETY AND DEPRESSED MOOD: ICD-10-CM

## 2020-04-10 DIAGNOSIS — K21.9 GASTROESOPHAGEAL REFLUX DISEASE, ESOPHAGITIS PRESENCE NOT SPECIFIED: Primary | ICD-10-CM

## 2020-04-10 DIAGNOSIS — E03.9 HYPOTHYROIDISM, UNSPECIFIED TYPE: ICD-10-CM

## 2020-04-10 PROCEDURE — 99214 OFFICE O/P EST MOD 30 MIN: CPT | Mod: TEL | Performed by: FAMILY MEDICINE

## 2020-04-10 PROCEDURE — 96127 BRIEF EMOTIONAL/BEHAV ASSMT: CPT | Performed by: FAMILY MEDICINE

## 2020-04-10 RX ORDER — CALCIUM CARBONATE 500 MG/1
4 TABLET, CHEWABLE ORAL 2 TIMES DAILY
COMMUNITY
End: 2020-04-14

## 2020-04-10 RX ORDER — OMEPRAZOLE 40 MG/1
40 CAPSULE, DELAYED RELEASE ORAL DAILY
Qty: 90 CAPSULE | Refills: 0 | Status: SHIPPED | OUTPATIENT
Start: 2020-04-10 | End: 2020-06-30

## 2020-04-10 RX ORDER — LEVOTHYROXINE SODIUM 88 UG/1
88 TABLET ORAL DAILY
Qty: 94 TABLET | Refills: 0 | Status: SHIPPED | OUTPATIENT
Start: 2020-04-10 | End: 2020-07-02

## 2020-04-10 RX ORDER — ESCITALOPRAM OXALATE 20 MG/1
20 TABLET ORAL DAILY
Qty: 30 TABLET | Refills: 1 | Status: SHIPPED | OUTPATIENT
Start: 2020-04-10 | End: 2020-04-15

## 2020-04-10 ASSESSMENT — ANXIETY QUESTIONNAIRES
3. WORRYING TOO MUCH ABOUT DIFFERENT THINGS: MORE THAN HALF THE DAYS
GAD7 TOTAL SCORE: 15
7. FEELING AFRAID AS IF SOMETHING AWFUL MIGHT HAPPEN: MORE THAN HALF THE DAYS
5. BEING SO RESTLESS THAT IT IS HARD TO SIT STILL: MORE THAN HALF THE DAYS
IF YOU CHECKED OFF ANY PROBLEMS ON THIS QUESTIONNAIRE, HOW DIFFICULT HAVE THESE PROBLEMS MADE IT FOR YOU TO DO YOUR WORK, TAKE CARE OF THINGS AT HOME, OR GET ALONG WITH OTHER PEOPLE: VERY DIFFICULT
1. FEELING NERVOUS, ANXIOUS, OR ON EDGE: MORE THAN HALF THE DAYS
2. NOT BEING ABLE TO STOP OR CONTROL WORRYING: MORE THAN HALF THE DAYS
6. BECOMING EASILY ANNOYED OR IRRITABLE: MORE THAN HALF THE DAYS

## 2020-04-10 ASSESSMENT — PATIENT HEALTH QUESTIONNAIRE - PHQ9
SUM OF ALL RESPONSES TO PHQ QUESTIONS 1-9: 9
5. POOR APPETITE OR OVEREATING: NEARLY EVERY DAY

## 2020-04-10 NOTE — PATIENT INSTRUCTIONS
Patient Education     What Is GERD?     With GERD, the weak LES allows food and fluids to travel back, or reflux, into the esophagus.      If you often have a painful burning feeling in your chest after you eat, you may have gastroesophageal reflux disease (GERD). Heartburn that keeps coming back is a classic symptom of GERD. But you may have other symptoms as well. A GERD diagnosis is made only after a complete evaluation by your healthcare provider.  Note: Chest pain may also be caused by heart problems. Be sure to have all chest pain evaluated by a healthcare provider.   When you have a reflux problem  After you eat, food travels from your mouth down the esophagus to your stomach. Along the way, food passes through a one-way valve called the lower esophageal sphincter (LES). The LES sits at the opening to your stomach. Normally the LES opens when you swallow. It lets food enter the stomach, then closes quickly. With GERD, the LES doesn t work normally. It lets food and stomach acid flow back (reflux) into the esophagus.  Some common symptoms    Frequent heartburn or burping    Sour-tasting fluid backing up into your mouth    Symptoms that get worse after you eat, bend over, or lie down    Trouble swallowing or pain when swallowing    A dry, long-term (chronic) cough    Upset stomach (nausea) or vomiting  Relieving your discomfort  You and your healthcare provider can work together to find the treatment options that best ease your symptoms. These may include lifestyle changes, medicine, and possibly surgery.  Many people find their GERD symptoms decrease when they eat small frequent meals instead of 3 large ones. Reducing the amount of fatty foods in your diet will also help.   The following foods tend to cause problems for people diagnosed with GERD:    Tomatoes and tomato products    Alcohol    Coffee    Peppermint    Greasy or spicy foods  Talk with your provider if you don t understand how to make the  dietary changes needed to control your GERD symptoms. Your provider can refer you to a nutritionist.  Date Last Reviewed: 7/1/2016 2000-2019 [employer]. [address] [place] [id number]. All rights reserved. This information is not intended as a substitute for professional medical care. Always follow your healthcare professional's instructions.           Patient Education     Tips to Control Acid Reflux    To control acid reflux, you ll need to make some basic diet and lifestyle changes. The simple steps outlined below may be all you ll need to ease discomfort.  Watch what you eat    Avoid fatty foods and spicy foods.    Eat fewer acidic foods, such as citrus and tomato-based foods. These can increase symptoms.    Limit drinking alcohol, caffeine, and fizzy beverages. All increase acid reflux.    Try limiting chocolate, peppermint, and spearmint. These can worsen acid reflux in some people.  Watch when you eat    Avoid lying down for 3 hours after eating.    Do not snack before going to bed.  Raise your head  Raising your head and upper body by 4 to 6 inches helps limit reflux when you re lying down. Put blocks under the head of your bed frame to raise it.  Other changes    Lose weight, if you need to    Don t exercise near bedtime    Avoid tight-fitting clothes    Limit aspirin and ibuprofen    Stop smoking   Date Last Reviewed: 7/1/2016 2000-2019 [employer]. [address] [place] [id number]. All rights reserved. This information is not intended as a substitute for professional medical care. Always follow your healthcare professional's instructions.

## 2020-04-11 ASSESSMENT — ANXIETY QUESTIONNAIRES: GAD7 TOTAL SCORE: 15

## 2020-04-13 DIAGNOSIS — E03.9 HYPOTHYROIDISM, UNSPECIFIED TYPE: ICD-10-CM

## 2020-04-13 DIAGNOSIS — F43.23 ADJUSTMENT DISORDER WITH MIXED ANXIETY AND DEPRESSED MOOD: ICD-10-CM

## 2020-04-13 LAB
T3FREE SERPL-MCNC: 1.8 PG/ML (ref 2.3–4.2)
T4 FREE SERPL-MCNC: 1.16 NG/DL (ref 0.76–1.46)
TSH SERPL DL<=0.005 MIU/L-ACNC: 2.3 MU/L (ref 0.4–4)

## 2020-04-13 PROCEDURE — 84443 ASSAY THYROID STIM HORMONE: CPT | Performed by: INTERNAL MEDICINE

## 2020-04-13 PROCEDURE — 84481 FREE ASSAY (FT-3): CPT | Performed by: INTERNAL MEDICINE

## 2020-04-13 PROCEDURE — 84439 ASSAY OF FREE THYROXINE: CPT | Performed by: INTERNAL MEDICINE

## 2020-04-13 PROCEDURE — 36415 COLL VENOUS BLD VENIPUNCTURE: CPT | Performed by: INTERNAL MEDICINE

## 2020-04-13 NOTE — TELEPHONE ENCOUNTER
"Requested Prescriptions   Pending Prescriptions Disp Refills     escitalopram (LEXAPRO) 20 MG tablet 30 tablet 1     Sig: Take 1 tablet (20 mg) by mouth daily   Last Written Prescription Date:  01/20/20  Last Fill Quantity: 90,  # refills: 0   Last office visit: 01/20/2020 with prescribing provider:  CHICO Mckee   Future Office Visit:   Next 5 appointments (look out 90 days)    Apr 13, 2020  1:30 PM CDT  Telephone Visit with Brad Garcia MD  [facility] [address] [facility] [id number] [phone]             SSRIs Protocol Failed - 4/13/2020  1:05 PM   MOHINDER-7 SCORE 11/12/2019 1/20/2020 4/10/2020   Total Score 12 21 15       PHQ 11/12/2019 1/20/2020 4/10/2020   PHQ-9 Total Score 19 18 9   Q9: Thoughts of better off dead/self-harm past 2 weeks Several days More than half the days Several days        Failed - PHQ-9 score less than 5 in past 6 months     Please review last PHQ-9 score.           Passed - Medication is active on med list        Passed - Patient is age 18 or older        Passed - No active pregnancy on record        Passed - No positive pregnancy test in last 12 months        Passed - Recent (6 mo) or future (30 days) visit within the authorizing provider's specialty     Patient had office visit in the last 6 months or has a visit in the next 30 days with authorizing provider or within the authorizing provider's specialty.  See \"Patient Info\" tab in inbasket, or \"Choose Columns\" in Meds & Orders section of the refill encounter.                 "

## 2020-04-13 NOTE — PROGRESS NOTES
"Mora Maldonado is a 25 year old female who is being evaluated via a billable telephone visit.      The patient has been notified of following:     \"This telephone visit will be conducted via a call between you and your physician/provider. We have found that certain health care needs can be provided without the need for a physical exam.  This service lets us provide the care you need with a short phone conversation.  If a prescription is necessary we can send it directly to your pharmacy.  If lab work is needed we can place an order for that and you can then stop by our lab to have the test done at a later time.    Telephone visits are billed at different rates depending on your insurance coverage. During this emergency period, for some insurers they may be billed the same as an in-person visit.  Please reach out to your insurance provider with any questions.    If during the course of the call the physician/provider feels a telephone visit is not appropriate, you will not be charged for this service.\"    Patient has given verbal consent for Telephone visit?  Yes    How would you like to obtain your AVS? MyChart    Subjective     Mora Maldonado is a 25 year old female who presents to clinic today for the following health issues:     Stomach issues, wondering if she has a ulcer. Up at night with stomach pains. Hurts after she eat. Feels like chest pain and back pain at the same time. X 2 weeks.    Wondering what her Tyroid results mean, have not heard back from other doctor yet. She is supposed to schedule a telephone visit with Dr Robert        Reviewed and updated as needed this visit by Provider         Review of Systems          Objective   Reported vitals:  There were no vitals taken for this visit.   healthy, alert and no distress  PSYCH: Alert and oriented times 3; coherent speech, normal   rate and volume, able to articulate logical thoughts, able   to abstract reason, no tangential thoughts, no " hallucinations   or delusions  Her affect is pleasant  RESP: No cough, no audible wheezing, able to talk in full sentences  Remainder of exam unable to be completed due to telephone visits    Diagnostic Test Results:  Labs reviewed in Epic        Assessment/Plan:  1. Abdominal pain, generalized    - CBC with platelets; Future  - Amylase; Future  - Hepatic panel; Future  - Lipase; Future  - H Pylori antigen stool; Future    No follow-ups on file.      Phone call duration:  21 minutes    Brad Garcia MD    --------------------------------------------------------------------------------------------------------------------------------------  SUBJECTIVE  HPI: Mora Maldonado is a 25 year old female who presents with the CC of abdominal/pelvic pain.    She has only been taking the omeprazole since yest am. She took it today as well.      Since our last telephone visit she has been waking up in the mildle of the night with abdominal cramping and bloating.  She did vomit once and there were black specks in it.  It looked little clots of black maybe 4 or 5 .  She denies anything resembling coffee grounds  Her stools have been a dark brown color    Every time she eats she gets cramping and bloating   It does not matter what she eats      She is taking levothyroxine and her lexapro   She denies taking ibuprofen naproxen frequently   She only tylenol as needed     She denies any lightheadedness     She has taken some tums but that has not helped  Drinking water does help         Social History     Socioeconomic History     Marital status: Single     Spouse name: Not on file     Number of children: Not on file     Years of education: Not on file     Highest education level: Not on file   Occupational History     Not on file   Social Needs     Financial resource strain: Not on file     Food insecurity     Worry: Not on file     Inability: Not on file     Transportation needs     Medical: Not on file     Non-medical: Not  on file   Tobacco Use     Smoking status: Never Smoker     Smokeless tobacco: Never Used   Substance and Sexual Activity     Alcohol use: Yes     Frequency: Monthly or less     Comment: once a month     Drug use: Never     Sexual activity: Yes     Partners: Male     Birth control/protection: Implant   Lifestyle     Physical activity     Days per week: Not on file     Minutes per session: Not on file     Stress: Not on file   Relationships     Social connections     Talks on phone: Not on file     Gets together: Not on file     Attends Sikhism service: Not on file     Active member of club or organization: Not on file     Attends meetings of clubs or organizations: Not on file     Relationship status: Not on file     Intimate partner violence     Fear of current or ex partner: Not on file     Emotionally abused: Not on file     Physically abused: Not on file     Forced sexual activity: Not on file   Other Topics Concern     Parent/sibling w/ CABG, MI or angioplasty before 65F 55M? Not Asked   Social History Narrative     Not on file       ROS:  AS  ABOVE    EXAMINATION:  There were no vitals taken for this visit.    GENERAL APPEARANCE: healthy, alert and no distress      ASSESSMENT AND PLAN:  Differential diagnosis includes:   GERD  PUD  GASTRITIS  H PYLORI INFECTION CAUSING GASTRITIS  CHOLELITIASIS/CHOLECYSTITIS  PANCREATITIS  HEPATITIS    At this point we will obtain the following evaluation:   A laboratory evaluation including: CBC, H. Pylori, LFT'S, lipase, amylase        I recommend(ed) that she get a laboratory appointment(s) today   Take one of the omeprazole  twice a day for the next 3 day(s) then go back to once a day(s)     Avoid acidic food such as citrus fruit and tomatoes    Follow up in 2 weeks via telephone   Consider an EGD if the symptom(s) are not better.

## 2020-04-14 ENCOUNTER — VIRTUAL VISIT (OUTPATIENT)
Dept: FAMILY MEDICINE | Facility: CLINIC | Age: 25
End: 2020-04-14
Payer: COMMERCIAL

## 2020-04-14 DIAGNOSIS — R10.84 ABDOMINAL PAIN, GENERALIZED: ICD-10-CM

## 2020-04-14 DIAGNOSIS — R10.84 ABDOMINAL PAIN, GENERALIZED: Primary | ICD-10-CM

## 2020-04-14 LAB
ALBUMIN SERPL-MCNC: 3.9 G/DL (ref 3.4–5)
ALP SERPL-CCNC: 69 U/L (ref 40–150)
ALT SERPL W P-5'-P-CCNC: 19 U/L (ref 0–50)
AMYLASE SERPL-CCNC: 81 U/L (ref 30–110)
AST SERPL W P-5'-P-CCNC: 15 U/L (ref 0–45)
BILIRUB DIRECT SERPL-MCNC: 0.1 MG/DL (ref 0–0.2)
BILIRUB SERPL-MCNC: 0.5 MG/DL (ref 0.2–1.3)
ERYTHROCYTE [DISTWIDTH] IN BLOOD BY AUTOMATED COUNT: 12.5 % (ref 10–15)
HCT VFR BLD AUTO: 42.9 % (ref 35–47)
HGB BLD-MCNC: 14.4 G/DL (ref 11.7–15.7)
LIPASE SERPL-CCNC: 86 U/L (ref 73–393)
MCH RBC QN AUTO: 29.6 PG (ref 26.5–33)
MCHC RBC AUTO-ENTMCNC: 33.6 G/DL (ref 31.5–36.5)
MCV RBC AUTO: 88 FL (ref 78–100)
PLATELET # BLD AUTO: 211 10E9/L (ref 150–450)
PROT SERPL-MCNC: 7.2 G/DL (ref 6.8–8.8)
RBC # BLD AUTO: 4.87 10E12/L (ref 3.8–5.2)
WBC # BLD AUTO: 6.3 10E9/L (ref 4–11)

## 2020-04-14 PROCEDURE — 80076 HEPATIC FUNCTION PANEL: CPT | Performed by: FAMILY MEDICINE

## 2020-04-14 PROCEDURE — 99213 OFFICE O/P EST LOW 20 MIN: CPT | Mod: TEL | Performed by: FAMILY MEDICINE

## 2020-04-14 PROCEDURE — 36415 COLL VENOUS BLD VENIPUNCTURE: CPT | Performed by: FAMILY MEDICINE

## 2020-04-14 PROCEDURE — 82150 ASSAY OF AMYLASE: CPT | Performed by: FAMILY MEDICINE

## 2020-04-14 PROCEDURE — 85027 COMPLETE CBC AUTOMATED: CPT | Performed by: FAMILY MEDICINE

## 2020-04-14 PROCEDURE — 83690 ASSAY OF LIPASE: CPT | Performed by: FAMILY MEDICINE

## 2020-04-14 PROCEDURE — 87338 HPYLORI STOOL AG IA: CPT | Performed by: FAMILY MEDICINE

## 2020-04-14 NOTE — RESULT ENCOUNTER NOTE
Mora,  I have reviewed the results of the laboratory tests that we recently ordered. All of the laboratory that are back so far are normal or considered normal for you. There are still some labs pending and I will let you know those results when they   are available.  Sincerely,   Brad Garcia MD

## 2020-04-15 LAB — H PYLORI AG STL QL IA: NEGATIVE

## 2020-04-15 RX ORDER — ESCITALOPRAM OXALATE 20 MG/1
20 TABLET ORAL DAILY
Qty: 30 TABLET | Refills: 1 | Status: SHIPPED | OUTPATIENT
Start: 2020-04-15 | End: 2020-05-12

## 2020-04-15 NOTE — RESULT ENCOUNTER NOTE
Mora,  I have reviewed the results of the laboratory tests that we recently ordered. All of the lab work performed was normal or considered normal for you.  Sincerely,   Brad Garcia MD

## 2020-04-17 ENCOUNTER — VIRTUAL VISIT (OUTPATIENT)
Dept: ENDOCRINOLOGY | Facility: CLINIC | Age: 25
End: 2020-04-17
Payer: COMMERCIAL

## 2020-04-17 VITALS — HEIGHT: 62 IN | WEIGHT: 156 LBS | BODY MASS INDEX: 28.71 KG/M2

## 2020-04-17 DIAGNOSIS — E03.9 HYPOTHYROIDISM, UNSPECIFIED TYPE: Primary | ICD-10-CM

## 2020-04-17 PROCEDURE — 99213 OFFICE O/P EST LOW 20 MIN: CPT | Mod: TEL | Performed by: INTERNAL MEDICINE

## 2020-04-17 ASSESSMENT — MIFFLIN-ST. JEOR: SCORE: 1399.19

## 2020-04-17 NOTE — PROGRESS NOTES
"Mora Maldonado is a 25 year old female who is being evaluated via a billable telephone visit.      The patient has been notified of following:     \"This telephone visit will be conducted via a call between you and your physician/provider. We have found that certain health care needs can be provided without the need for a physical exam.  This service lets us provide the care you need with a short phone conversation.  If a prescription is necessary we can send it directly to your pharmacy.  If lab work is needed we can place an order for that and you can then stop by our lab to have the test done at a later time.    Telephone visits are billed at different rates depending on your insurance coverage. During this emergency period, for some insurers they may be billed the same as an in-person visit.  Please reach out to your insurance provider with any questions.    If during the course of the call the physician/provider feels a telephone visit is not appropriate, you will not be charged for this service.\"    Patient has given verbal consent for Telephone visit?  Yes    How would you like to obtain your AVS? Yuki    Additional provider notes:    Patient here for evaluation of hypothyroidism.   She went to see her primary care in the summer of 2019 because of trouble losing weight, fatigue, and hair loss.   She was started on 25 mcg levothyroxine daily.   Then increased to 50 and then 100 mcg.   After labs in 11/2019 showed low TSH, her dose was dropped to 88 mcg daily.      She has also been started on lexapro.   She has had Nexplanon for birth control for several years.      Hair has become thicker but is still dry.      She takes the levothyroxine in the AM with her lexapro, 30 minutes before eating.   Denies missing any doses.      Sometimes with drink [employer].      She was taking biotin but comments she stopped it before her 11/2019 labs.   No iodine or kelp.      Sleeping 10 hours a night and not refreshed " in the AM. Tossing and turning. No snoring.      She was having constipation 1 month ago which improved with having more fiber in her diet.     In 4/2020, she has been having abdominal pain, nausea and emesis.   She has doubled her PPI dose and better the last two days.   Her hair is growing thicker and faster.     She is concerned she is gaining weight.   The last two weeks she has been tracking her meals through weight watchers. 26 points a day.   She has been on this for 4 months but tracking more attentively recently.   Walks her dog daily.   Does note less active since COVID.     Sleep is improved. Feeling rested and not having to nap.     He lexapro was increased a few days ago to help with anxiety.     A/P:   Hypothyroidism - Extensive discussion of thyroid hormone and normal physiology. Included was discussion of thyroid in  relation to weight and energy. She has had trouble getting a consistent TSH level. She has been compliant with taking medication. While she is taking her levothyroxine with lexapro which can its absorption, this has been a consistent pattern. Based on weight, her full replacement dose would be 114 mcg daily. She was taking biotin close to her 11/2019 labs and during her 10/2019 which could have caused a falsely low reading.   In 1/2020, recommend taking two tablets of 88 mcg on Sundays and 1 tablet the other 6 days of the week.   In 4/2020, labs normal aside from slightly low free T3. Discussed how highly variable this reading is.   -Try lowering point total for [employer] from 26 to 24.   -Increase exercise as able.   -No change to levothyroxine.   -Labs in 3 months.     Phone call duration: 18    Due to the COVID 19 pandemic this visit was converted to a telephone visit in order to help prevent spread of infection in this high risk patient and the general population. The patient gave verbal consent for the telephone visit today.    Start time 0800  Stop time 0818  Total time  18  This visit would have been billed as [id number] as an E & M code      Stephan Robert MD on 4/17/2020 at 8:19 AM

## 2020-04-27 ENCOUNTER — MYC MEDICAL ADVICE (OUTPATIENT)
Dept: ENDOCRINOLOGY | Facility: CLINIC | Age: 25
End: 2020-04-27

## 2020-05-04 ENCOUNTER — NURSE TRIAGE (OUTPATIENT)
Dept: NURSING | Facility: CLINIC | Age: 25
End: 2020-05-04

## 2020-05-04 ENCOUNTER — APPOINTMENT (OUTPATIENT)
Dept: GENERAL RADIOLOGY | Facility: CLINIC | Age: 25
End: 2020-05-04
Attending: FAMILY MEDICINE
Payer: COMMERCIAL

## 2020-05-04 ENCOUNTER — HOSPITAL ENCOUNTER (EMERGENCY)
Facility: CLINIC | Age: 25
Discharge: HOME | End: 2020-05-04
Attending: FAMILY MEDICINE | Admitting: FAMILY MEDICINE
Payer: COMMERCIAL

## 2020-05-04 VITALS
TEMPERATURE: 98.1 F | WEIGHT: 155 LBS | HEIGHT: 62 IN | HEART RATE: 108 BPM | OXYGEN SATURATION: 100 % | SYSTOLIC BLOOD PRESSURE: 125 MMHG | BODY MASS INDEX: 28.52 KG/M2 | RESPIRATION RATE: 18 BRPM | DIASTOLIC BLOOD PRESSURE: 82 MMHG

## 2020-05-04 DIAGNOSIS — S93.401A SPRAIN OF RIGHT ANKLE, UNSPECIFIED LIGAMENT, INITIAL ENCOUNTER: ICD-10-CM

## 2020-05-04 PROCEDURE — 73610 X-RAY EXAM OF ANKLE: CPT | Mod: RT

## 2020-05-04 PROCEDURE — 29515 APPLICATION SHORT LEG SPLINT: CPT | Mod: RT | Performed by: FAMILY MEDICINE

## 2020-05-04 PROCEDURE — 99284 EMERGENCY DEPT VISIT MOD MDM: CPT | Mod: 25 | Performed by: FAMILY MEDICINE

## 2020-05-04 PROCEDURE — 99284 EMERGENCY DEPT VISIT MOD MDM: CPT | Mod: Z6 | Performed by: FAMILY MEDICINE

## 2020-05-04 PROCEDURE — 73630 X-RAY EXAM OF FOOT: CPT | Mod: RT

## 2020-05-04 PROCEDURE — 25000132 ZZH RX MED GY IP 250 OP 250 PS 637: Performed by: FAMILY MEDICINE

## 2020-05-04 RX ORDER — ACETAMINOPHEN 500 MG
1000 TABLET ORAL ONCE
Status: COMPLETED | OUTPATIENT
Start: 2020-05-04 | End: 2020-05-04

## 2020-05-04 RX ADMIN — ACETAMINOPHEN 1000 MG: 500 TABLET, FILM COATED ORAL at 21:40

## 2020-05-04 ASSESSMENT — MIFFLIN-ST. JEOR: SCORE: 1391.33

## 2020-05-04 NOTE — ED AVS SNAPSHOT
[facility] Emergency Department  [address] [facility] [address]  Phone:  [phone]  Fax:  [phone]                                    Mora Maldonado   MRN: [id number]    Department:  [facility] Emergency Department   Date of Visit:  5/4/2020           After Visit Summary Signature Page    I have received my discharge instructions, and my questions have been answered. I have discussed any challenges I see with this plan with the nurse or doctor.    ..........................................................................................................................................  Patient/Patient Representative Signature      ..........................................................................................................................................  Patient Representative Print Name and Relationship to Patient    ..................................................               ................................................  Date                                   Time    ..........................................................................................................................................  Reviewed by Signature/Title    ...................................................              ..............................................  Date                                               Time          22EPIC Rev 08/18

## 2020-05-05 ENCOUNTER — APPOINTMENT (OUTPATIENT)
Dept: ULTRASOUND IMAGING | Facility: CLINIC | Age: 25
End: 2020-05-05
Attending: EMERGENCY MEDICINE
Payer: COMMERCIAL

## 2020-05-05 ENCOUNTER — APPOINTMENT (OUTPATIENT)
Dept: CT IMAGING | Facility: CLINIC | Age: 25
End: 2020-05-05
Attending: EMERGENCY MEDICINE
Payer: COMMERCIAL

## 2020-05-05 ENCOUNTER — HOSPITAL ENCOUNTER (EMERGENCY)
Facility: CLINIC | Age: 25
Discharge: HOME | End: 2020-05-05
Attending: EMERGENCY MEDICINE | Admitting: EMERGENCY MEDICINE
Payer: COMMERCIAL

## 2020-05-05 ENCOUNTER — NURSE TRIAGE (OUTPATIENT)
Dept: FAMILY MEDICINE | Facility: CLINIC | Age: 25
End: 2020-05-05

## 2020-05-05 VITALS
SYSTOLIC BLOOD PRESSURE: 110 MMHG | BODY MASS INDEX: 28.52 KG/M2 | HEART RATE: 91 BPM | TEMPERATURE: 98.8 F | WEIGHT: 155 LBS | DIASTOLIC BLOOD PRESSURE: 70 MMHG | OXYGEN SATURATION: 98 % | HEIGHT: 62 IN | RESPIRATION RATE: 20 BRPM

## 2020-05-05 DIAGNOSIS — F43.23 ADJUSTMENT DISORDER WITH MIXED ANXIETY AND DEPRESSED MOOD: ICD-10-CM

## 2020-05-05 DIAGNOSIS — N83.201 RIGHT OVARIAN CYST: ICD-10-CM

## 2020-05-05 DIAGNOSIS — R10.9 RIGHT SIDED ABDOMINAL PAIN: ICD-10-CM

## 2020-05-05 DIAGNOSIS — K80.20 GALLSTONES: ICD-10-CM

## 2020-05-05 LAB
ALBUMIN SERPL-MCNC: 3.3 G/DL (ref 3.4–5)
ALBUMIN UR-MCNC: NEGATIVE MG/DL
ALP SERPL-CCNC: 72 U/L (ref 40–150)
ALT SERPL W P-5'-P-CCNC: 20 U/L (ref 0–50)
ANION GAP SERPL CALCULATED.3IONS-SCNC: 4 MMOL/L (ref 3–14)
APPEARANCE UR: CLEAR
AST SERPL W P-5'-P-CCNC: 15 U/L (ref 0–45)
BASOPHILS # BLD AUTO: 0.1 10E9/L (ref 0–0.2)
BASOPHILS NFR BLD AUTO: 0.6 %
BILIRUB SERPL-MCNC: 0.3 MG/DL (ref 0.2–1.3)
BILIRUB UR QL STRIP: NEGATIVE
BUN SERPL-MCNC: 14 MG/DL (ref 7–30)
CALCIUM SERPL-MCNC: 8 MG/DL (ref 8.5–10.1)
CHLORIDE SERPL-SCNC: 108 MMOL/L (ref 94–109)
CO2 SERPL-SCNC: 27 MMOL/L (ref 20–32)
COLOR UR AUTO: NORMAL
CREAT SERPL-MCNC: 0.74 MG/DL (ref 0.52–1.04)
DIFFERENTIAL METHOD BLD: NORMAL
EOSINOPHIL # BLD AUTO: 0.3 10E9/L (ref 0–0.7)
EOSINOPHIL NFR BLD AUTO: 3.6 %
ERYTHROCYTE [DISTWIDTH] IN BLOOD BY AUTOMATED COUNT: 11.9 % (ref 10–15)
GFR SERPL CREATININE-BSD FRML MDRD: >90 ML/MIN/{1.73_M2}
GLUCOSE SERPL-MCNC: 104 MG/DL (ref 70–99)
GLUCOSE UR STRIP-MCNC: NEGATIVE MG/DL
HCG UR QL: NEGATIVE
HCT VFR BLD AUTO: 38.9 % (ref 35–47)
HGB BLD-MCNC: 12.9 G/DL (ref 11.7–15.7)
HGB UR QL STRIP: NEGATIVE
IMM GRANULOCYTES # BLD: 0 10E9/L (ref 0–0.4)
IMM GRANULOCYTES NFR BLD: 0.3 %
KETONES UR STRIP-MCNC: NEGATIVE MG/DL
LEUKOCYTE ESTERASE UR QL STRIP: NEGATIVE
LIPASE SERPL-CCNC: 136 U/L (ref 73–393)
LYMPHOCYTES # BLD AUTO: 2.7 10E9/L (ref 0.8–5.3)
LYMPHOCYTES NFR BLD AUTO: 33.8 %
MCH RBC QN AUTO: 29.5 PG (ref 26.5–33)
MCHC RBC AUTO-ENTMCNC: 33.2 G/DL (ref 31.5–36.5)
MCV RBC AUTO: 89 FL (ref 78–100)
MONOCYTES # BLD AUTO: 0.5 10E9/L (ref 0–1.3)
MONOCYTES NFR BLD AUTO: 6 %
NEUTROPHILS # BLD AUTO: 4.4 10E9/L (ref 1.6–8.3)
NEUTROPHILS NFR BLD AUTO: 55.7 %
NITRATE UR QL: NEGATIVE
NRBC # BLD AUTO: 0 10*3/UL
NRBC BLD AUTO-RTO: 0 /100
PH UR STRIP: 6 PH (ref 5–7)
PLATELET # BLD AUTO: 241 10E9/L (ref 150–450)
POTASSIUM SERPL-SCNC: 4.1 MMOL/L (ref 3.4–5.3)
PROT SERPL-MCNC: 6.7 G/DL (ref 6.8–8.8)
RBC # BLD AUTO: 4.37 10E12/L (ref 3.8–5.2)
SODIUM SERPL-SCNC: 139 MMOL/L (ref 133–144)
SOURCE: NORMAL
SP GR UR STRIP: 1.01 (ref 1–1.03)
UROBILINOGEN UR STRIP-MCNC: 0 MG/DL (ref 0–2)
WBC # BLD AUTO: 8 10E9/L (ref 4–11)

## 2020-05-05 PROCEDURE — 80053 COMPREHEN METABOLIC PANEL: CPT | Performed by: EMERGENCY MEDICINE

## 2020-05-05 PROCEDURE — 99285 EMERGENCY DEPT VISIT HI MDM: CPT | Mod: 25 | Performed by: EMERGENCY MEDICINE

## 2020-05-05 PROCEDURE — 81003 URINALYSIS AUTO W/O SCOPE: CPT | Performed by: EMERGENCY MEDICINE

## 2020-05-05 PROCEDURE — 25000128 H RX IP 250 OP 636: Performed by: EMERGENCY MEDICINE

## 2020-05-05 PROCEDURE — 81025 URINE PREGNANCY TEST: CPT | Performed by: EMERGENCY MEDICINE

## 2020-05-05 PROCEDURE — 99284 EMERGENCY DEPT VISIT MOD MDM: CPT | Mod: Z6 | Performed by: EMERGENCY MEDICINE

## 2020-05-05 PROCEDURE — 96360 HYDRATION IV INFUSION INIT: CPT | Mod: 59 | Performed by: EMERGENCY MEDICINE

## 2020-05-05 PROCEDURE — 83690 ASSAY OF LIPASE: CPT | Performed by: EMERGENCY MEDICINE

## 2020-05-05 PROCEDURE — 85025 COMPLETE CBC W/AUTO DIFF WBC: CPT | Performed by: EMERGENCY MEDICINE

## 2020-05-05 PROCEDURE — 76705 ECHO EXAM OF ABDOMEN: CPT

## 2020-05-05 PROCEDURE — 25800030 ZZH RX IP 258 OP 636: Performed by: EMERGENCY MEDICINE

## 2020-05-05 PROCEDURE — 25000125 ZZHC RX 250: Performed by: EMERGENCY MEDICINE

## 2020-05-05 PROCEDURE — 74177 CT ABD & PELVIS W/CONTRAST: CPT

## 2020-05-05 RX ORDER — ESCITALOPRAM OXALATE 20 MG/1
TABLET ORAL
Qty: 30 TABLET | Refills: 1 | OUTPATIENT
Start: 2020-05-05

## 2020-05-05 RX ORDER — HYDROCODONE BITARTRATE AND ACETAMINOPHEN 5; 325 MG/1; MG/1
1 TABLET ORAL
Status: DISCONTINUED | OUTPATIENT
Start: 2020-05-05 | End: 2020-05-06 | Stop reason: HOSPADM

## 2020-05-05 RX ORDER — HYDROCODONE BITARTRATE AND ACETAMINOPHEN 5; 325 MG/1; MG/1
1 TABLET ORAL EVERY 6 HOURS PRN
Qty: 7 TABLET | Refills: 0 | Status: SHIPPED | OUTPATIENT
Start: 2020-05-05 | End: 2020-08-08

## 2020-05-05 RX ORDER — IOPAMIDOL 755 MG/ML
76 INJECTION, SOLUTION INTRAVASCULAR ONCE
Status: COMPLETED | OUTPATIENT
Start: 2020-05-05 | End: 2020-05-05

## 2020-05-05 RX ADMIN — IOPAMIDOL 76 ML: 755 INJECTION, SOLUTION INTRAVENOUS at 21:28

## 2020-05-05 RX ADMIN — SODIUM CHLORIDE 59 ML: 9 INJECTION, SOLUTION INTRAVENOUS at 21:28

## 2020-05-05 RX ADMIN — SODIUM CHLORIDE 500 ML: 9 INJECTION, SOLUTION INTRAVENOUS at 21:15

## 2020-05-05 ASSESSMENT — ENCOUNTER SYMPTOMS
RESPIRATORY NEGATIVE: 1
EYES NEGATIVE: 1
NEUROLOGICAL NEGATIVE: 1
MUSCULOSKELETAL NEGATIVE: 1
ALLERGIC/IMMUNOLOGIC NEGATIVE: 1
HEMATOLOGIC/LYMPHATIC NEGATIVE: 1
PSYCHIATRIC NEGATIVE: 1
ENDOCRINE NEGATIVE: 1
ABDOMINAL PAIN: 1
CARDIOVASCULAR NEGATIVE: 1
CONSTITUTIONAL NEGATIVE: 1

## 2020-05-05 ASSESSMENT — MIFFLIN-ST. JEOR: SCORE: 1391.33

## 2020-05-05 NOTE — ED NOTES
Abdominal pain in upper abdomen and down right side of abdomen for past couple weeks. Pt was told to limit acidic foods and started omeprazole. Pt reports this did not help. Pt denies nausea or vomiting. Pt with soft brown stools. Last BM this AM. No urinary complaints. Pt reports adequate hydration. Pt reports pain is much worse after eating and often flairs up during the night. Pt states pain got much more severe tonight , 8/10. Pain stabbing in nature. No prior GI surgeries.
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
## 2020-05-05 NOTE — TELEPHONE ENCOUNTER
"    Reason for Disposition    SEVERE abdominal pain (e.g., excruciating)    Additional Information    Negative: Passed out (i.e., fainted, collapsed and was not responding)    Negative: Shock suspected (e.g., cold/pale/clammy skin, too weak to stand, low BP, rapid pulse)    Negative: Sounds like a life-threatening emergency to the triager    Negative: Chest pain    Negative: Pain is mainly in upper abdomen (if needed ask: 'is it mainly above the belly button?')    Negative: Abdominal pain and pregnant > 20 weeks    Negative: Abdominal pain and pregnant < 20 weeks    Answer Assessment - Initial Assessment Questions  1. LOCATION: \"Where does it hurt?\"       Middle of abdomen just about an inch higher then umbilicus and then goes to right and down to about the hip area.  2. RADIATION: \"Does the pain shoot anywhere else?\" (e.g., chest, back)      First few weeks would shoot into the middle of her back but hasn't at this time.  3. ONSET: \"When did the pain begin?\" (e.g., minutes, hours or days ago)       This pain now started an hour ago.  States is worse than the pain she'd have for past multiple weeks.  4. SUDDEN: \"Gradual or sudden onset?\"      Sudden onset today; states has been constant and feels pain is worsening  5. PATTERN \"Does the pain come and go, or is it constant?\"     - If constant: \"Is it getting better, staying the same, or worsening?\"       (Note: Constant means the pain never goes away completely; most serious pain is constant and it progresses)      - If intermittent: \"How long does it last?\" \"Do you have pain now?\"      (Note: Intermittent means the pain goes away completely between bouts)      States has been constant for at least an hour.  Describes it as a stabbing pain mixed with a really bad cramp.    6. SEVERITY: \"How bad is the pain?\"  (e.g., Scale 1-10; mild, moderate, or severe)    - MILD (1-3): doesn't interfere with normal activities, abdomen soft and not tender to touch     - MODERATE " "(4-7): interferes with normal activities or awakens from sleep, tender to touch     - SEVERE (8-10): excruciating pain, doubled over, unable to do any normal activities       Rates pain at an 8.  States having difficulty standing up; would double over.  States could barely walk; needed to sit down.   I asked her if she was able to stand up at this time.  She attempted to and said she couldn't and had to sit back down.  Her mom is there and will take her to the ED now for assessment.  7. RECURRENT SYMPTOM: \"Have you ever had this type of abdominal pain before?\" If so, ask: \"When was the last time?\" and \"What happened that time?\"       States onset about a month ago.  States clinician thought she may have an ulcer but she doubled up on her GERD med and no improvement.  Symptoms now getting worse  8. CAUSE: \"What do you think is causing the abdominal pain?\"      She doesn't know.  9. RELIEVING/AGGRAVATING FACTORS: \"What makes it better or worse?\" (e.g., movement, antacids, bowel movement) standing, bending, moving worsen it.  Better if sits or lies down.        10. OTHER SYMPTOMS: \"Has there been any vomiting, diarrhea, constipation, or urine problems?\"        States diarrhea x2 days.  She describes her stools as medium brown in color; no sign of blood.  Mushy formed and has been having one stool daily.  Denies constipation.  11. PREGNANCY: \"Is there any chance you are pregnant?\" \"When was your last menstrual period?\"          No Nausea or vomiting.  No fever, chills, or sweats.  Denies constipation., history of GERD but states isn't GERD. No chest pain.  Has never had abdomen surgery.  Has her appendix.  States nothing makes it better.    Protocols used: ABDOMINAL PAIN - FEMALE-A-OH      "

## 2020-05-05 NOTE — ED NOTES
US in progress. Pt offered norco, though would like to see evaluation results prior to taking medication--also patient did drive self to ER and is aware she wouldn't be able to drive self home.

## 2020-05-05 NOTE — DISCHARGE INSTRUCTIONS
Return to the Emergency Room if the following occurs:     Worsened pain, fever >101, or for any concern at anytime.    Or, follow-up with the following provider as we discussed:     Return to your primary doctor as needed, or if not improved over the next 2 weeks.    Medications discussed:    Ibuprofen 600 mg every six hours for pain (7 days duration).  Tylenol 1000 mg every six hours for pain (7 days duration).  Therefore, you can alternate these every three hours and do it safely.  Use the splint as needed for comfort.  Try to remove the splint and walk normally after 3 to 4 days of use.    If you received pain-relieving or sedating medication during your time in the ER, avoid alcohol, driving automobiles, or working with machinery.  Also, a responsible adult must stay with you.        Call the Nurse Advice Line at [phone] or [phone] for any concern at anytime.
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
## 2020-05-05 NOTE — ED AVS SNAPSHOT
[facility] Emergency Department  [address] [address]  Phone:  [phone]  Fax:  [phone]                                    Mora Maldonado   MRN: [id number]    Department:  [facility] Emergency Department   Date of Visit:  5/5/2020           After Visit Summary Signature Page    I have received my discharge instructions, and my questions have been answered. I have discussed any challenges I see with this plan with the nurse or doctor.    ..........................................................................................................................................  Patient/Patient Representative Signature      ..........................................................................................................................................  Patient Representative Print Name and Relationship to Patient    ..................................................               ................................................  Date                                   Time    ..........................................................................................................................................  Reviewed by Signature/Title    ...................................................              ..............................................  Date                                               Time          22EPIC Rev 08/18

## 2020-05-05 NOTE — TELEPHONE ENCOUNTER
Per Electronic Health Record, patient has adjustment disorder, last addressed by VICENTE Caldwell  Routing to care team.  Sujatha Fuchs RN

## 2020-05-06 NOTE — DISCHARGE INSTRUCTIONS
1) Your evaluation today suggest your pain may be related to gallstones noted on ultrasound and a right ovarian cyst.  We have discussed and reviewed next steps for care.  You should call the general surgery clinic to discuss options to manage her gallstones  and gynecology clinic for follow-up for ovarian cyst.  Your appointments may be a virtual visit due to current restrictions and limitations with coronavirus pandemic.    2) Although we have agreed that you are stable for discharge.  If your pain worsens or you develop new concerns including but not limited to fever, nausea and vomiting, increasing pain despite the pain regimen and plan reviewed you should return to the department to be reevaluated-as this may be concerning for an infected gallstone or a twisted ovary- (ovarian torsion)

## 2020-05-11 ENCOUNTER — MYC MEDICAL ADVICE (OUTPATIENT)
Dept: FAMILY MEDICINE | Facility: CLINIC | Age: 25
End: 2020-05-11

## 2020-05-12 ENCOUNTER — OFFICE VISIT (OUTPATIENT)
Dept: SURGERY | Facility: CLINIC | Age: 25
End: 2020-05-12
Payer: COMMERCIAL

## 2020-05-12 ENCOUNTER — NURSE TRIAGE (OUTPATIENT)
Dept: NURSING | Facility: CLINIC | Age: 25
End: 2020-05-12

## 2020-05-12 ENCOUNTER — MYC REFILL (OUTPATIENT)
Dept: FAMILY MEDICINE | Facility: CLINIC | Age: 25
End: 2020-05-12

## 2020-05-12 VITALS
SYSTOLIC BLOOD PRESSURE: 118 MMHG | DIASTOLIC BLOOD PRESSURE: 71 MMHG | HEIGHT: 62 IN | WEIGHT: 154.98 LBS | TEMPERATURE: 97.9 F | BODY MASS INDEX: 28.52 KG/M2 | HEART RATE: 81 BPM

## 2020-05-12 DIAGNOSIS — K80.20 GALLSTONES: Primary | ICD-10-CM

## 2020-05-12 DIAGNOSIS — Z11.59 ENCOUNTER FOR SCREENING FOR OTHER VIRAL DISEASES: Primary | ICD-10-CM

## 2020-05-12 DIAGNOSIS — F43.23 ADJUSTMENT DISORDER WITH MIXED ANXIETY AND DEPRESSED MOOD: ICD-10-CM

## 2020-05-12 PROCEDURE — 99204 OFFICE O/P NEW MOD 45 MIN: CPT | Performed by: SURGERY

## 2020-05-12 RX ORDER — HYDROCODONE BITARTRATE AND ACETAMINOPHEN 5; 325 MG/1; MG/1
1-2 TABLET ORAL EVERY 6 HOURS PRN
Qty: 20 TABLET | Refills: 0 | Status: ON HOLD | OUTPATIENT
Start: 2020-05-12 | End: 2020-05-15

## 2020-05-12 ASSESSMENT — MIFFLIN-ST. JEOR: SCORE: 1391.25

## 2020-05-12 NOTE — NURSING NOTE
"Initial /71 (BP Location: Right arm, Patient Position: Sitting, Cuff Size: Adult Regular)   Pulse 81   Temp 97.9  F (36.6  C) (Tympanic)   Ht 1.575 m (5' 2\")   Wt 70.3 kg (154 lb 15.7 oz)   BMI 28.35 kg/m   Estimated body mass index is 28.35 kg/m  as calculated from the following:    Height as of this encounter: 1.575 m (5' 2\").    Weight as of this encounter: 70.3 kg (154 lb 15.7 oz). .    Sari Gaxiola MA    "

## 2020-05-12 NOTE — TELEPHONE ENCOUNTER
Shortness of breath and nausea today.  States she did have labs drawn today as she will be having her gallbladder removed on Friday.  Wants to make sure that she does not need to do anything else.     No other symptoms.  Just started.  Declined triage. Informed to call back if other symptoms develop and we can triage the symptoms.    Patient stated understanding.     Su Dean, RN/[facility]    Reason for Disposition    Health Information question, no triage required and triager able to answer question    Additional Information    Negative: [1] Caller is not with the adult (patient) AND [2] reporting urgent symptoms    Negative: Lab result questions    Negative: Medication questions    Negative: Caller can't be reached by phone    Negative: Caller has already spoken to PCP or another triager    Negative: RN needs further essential information from caller in order to complete triage    Negative: Requesting regular office appointment    Negative: [1] Caller requesting NON-URGENT health information AND [2] PCP's office is the best resource    Protocols used: INFORMATION ONLY CALL-A-

## 2020-05-12 NOTE — LETTER
5/12/2020         RE: Mora Maldonado  [address] [facility] [address]        Dear Colleague,    Thank you for referring your patient, Mora Maldonado, to the [facility]. Please see a copy of my visit note below.    25-year-old female complaint of 2-month history of epigastric and right upper quadrant abdominal pain.  Patient reports the pain is almost exclusively postprandial and can last for several hours.  She reports nausea without vomiting.  She denies fevers and chills or changes in bowel habits.  Patient was recently seen in emergency room with similar symptoms and diagnosed with gallstones.  No other associated symptoms.    Patient Active Problem List   Diagnosis     Papanicolaou smear of cervix with low grade squamous intraepithelial lesion (LGSIL)     Hypothyroidism, unspecified type     Adjustment disorder with mixed anxiety and depressed mood       Past Medical History:   Diagnosis Date     Abnormal Pap smear of cervix 09/03/2019    See problem list     Thyroid disease        History reviewed. No pertinent surgical history.    Family History   Problem Relation Age of Onset     Rheumatoid Arthritis Mother      Thyroid Disease Maternal Grandmother        Social History     Tobacco Use     Smoking status: Never Smoker     Smokeless tobacco: Never Used   Substance Use Topics     Alcohol use: Never     Frequency: Monthly or less     Comment: once a month        History   Drug Use Unknown       Current Outpatient Medications   Medication Sig Dispense Refill     HYDROcodone-acetaminophen (NORCO) 5-325 MG tablet Take 1-2 tablets by mouth every 6 hours as needed for pain 20 tablet 0     escitalopram (LEXAPRO) 20 MG tablet Take 1 tablet (20 mg) by mouth daily 30 tablet 1     etonogestrel (IMPLANON/NEXPLANON) 68 MG IMPL Inject 68 mg Subcutaneous       HYDROcodone-acetaminophen (NORCO) 5-325 MG tablet Take 1 tablet by mouth every 6 hours as needed 7 tablet 0     levothyroxine  (SYNTHROID/LEVOTHROID) 88 MCG tablet Take 1 tablet (88 mcg) by mouth daily Take one tablet daily but on Sunday take 2 tablets 94 tablet 0     omeprazole (PRILOSEC) 40 MG DR capsule Take 1 capsule (40 mg) by mouth daily 90 capsule 0       Allergies   Allergen Reactions     Amoxicillin Hives     Penicillins Hives      CBC  Recent Labs   Lab Test 05/05/20  1822   WBC 8.0   RBC 4.37   HGB 12.9   HCT 38.9   MCV 89   MCH 29.5   MCHC 33.2   RDW 11.9          BMP  Recent Labs   Lab Test 05/05/20  1822      POTASSIUM 4.1   NOEL 8.0*   CHLORIDE 108   CO2 27   BUN 14   CR 0.74   *       LFTs  Recent Labs   Lab Test 05/05/20 1822   PROTTOTAL 6.7*   ALBUMIN 3.3*   BILITOTAL 0.3   ALKPHOS 72   AST 15   ALT 20     Results for orders placed or performed during the hospital encounter of 05/05/20   Abdomen US, limited (RUQ only)    Narrative    ULTRASOUND ABDOMEN LIMITED 5/5/2020 7:05 PM    CLINICAL HISTORY: Epigastric and right-sided abdominal pain and  discomfort x 6 weeks with increasing discomfort. Evaluate for acute  hepatobiliary process.    TECHNIQUE: Limited abdominal ultrasound.    COMPARISON: None.    FINDINGS:    GALLBLADDER: Shadowing stones fill the gallbladder. No gallbladder  wall thickening or pericholecystic fluid. No tenderness with direct  transducer pressure over the gallbladder.    BILE DUCTS: There is no biliary dilatation. The common duct measures 3  mm.    LIVER: Normal where seen.    RIGHT KIDNEY: There is mild to moderate right hydronephrosis. Right  kidney size is normal.    PANCREAS: The visualized portions of the pancreas are normal.    No ascites.      Impression    IMPRESSION:  1.  Mild to moderate right hydronephrosis.  2.  Cholelithiasis without sonographic evidence of acute  cholecystitis.    DESIREE GRUBBS MD   CT Abdomen Pelvis w Contrast    Narrative    EXAM: CT ABDOMEN PELVIS W CONTRAST  LOCATION: [facility]  DATE/TIME: 5/5/2020 9:18 PM    INDICATION:  "Right-sided abdominal pain.  COMPARISON: None.  TECHNIQUE: CT scan of the abdomen and pelvis was performed following injection of IV contrast. Multiplanar reformats were obtained. Dose reduction techniques were used.  CONTRAST: 76 ml Isovue 370    FINDINGS:   LOWER CHEST: Normal.    HEPATOBILIARY: Normal.    PANCREAS: Normal.    SPLEEN: Normal.    ADRENAL GLANDS: Normal.    KIDNEYS/BLADDER: Normal.    BOWEL: Normal appendix.    LYMPH NODES: Normal.    VASCULATURE: Unremarkable.    PELVIC ORGANS: 3.8 x 3.6 cm cyst within the right ovary.    MUSCULOSKELETAL: Normal.      Impression    IMPRESSION:   1.  3.8 x 3.6 cm cyst right ovary.    2.  No appendicitis.       ROS  Constitutional - Denies fevers, weight loss, malaise, lethargy  Neuro - Denies tremors or seizures  Pulmon - Denies SOB, dyspnea, hemoptysis, chronic cough or use of an inhaler  CV - Denies CP, SOB, lower extremity edema, difficulty w/ stairs, has never used NTG  GI - Denies hematemesis, BRBPR, melena, chronic diarrhea or epigastric pain   - Denies hematuria, difficulty voiding, h/o STDs  Hematology - Denies blood clotting disorders, chronic anemias  Dermatology - No melanomas or skin cancers  Rheumatology - No h/o RA  Pysch - Denies depression, bipolar d/o or schizophrenia    Exam:/71 (BP Location: Right arm, Patient Position: Sitting, Cuff Size: Adult Regular)   Pulse 81   Temp 97.9  F (36.6  C) (Tympanic)   Ht 1.575 m (5' 2\")   Wt 70.3 kg (154 lb 15.7 oz)   BMI 28.35 kg/m      General - Alert and Oriented X4, NAD, well nourished  HEENT - Normocephalic, atraumatic, PERRLA, Nose midline, Throat without lesions  Neck - supple, no LAD, Thyroid normal, Carotids without bruits  Lungs - Clear to auscultation bilaterally with good inspiratory effort, no tactile fremitus  CV - Heart RRR, no lift's, thrills, murmurs, rubs, or gallops. Carotid, radial, and femoral pulses 2+ bilaterally  Abdomen - Soft, non-tender, +BS, no hepatosplenomegaly, no " palpable masses  Neuro - Full ROM, Strength 5/5 and major muscle groups, sensation intact  Extremities - No cyanosis, clubbing or edema    Assessment and plan: 25-year-old female with symptomatic cholelithiasis.Patient is a good candidate for laparoscopic cholecystectomy.  Risks benefits alternatives and complications were discussed with the patient including the possibility of infection bleeding or bile leak.  Patient understood and wished to proceed. PATIENT IS CLEARED FOR SURGERY.    Peter Hidalgo MD     Again, thank you for allowing me to participate in the care of your patient.        Sincerely,        Peter Hidalgo MD

## 2020-05-12 NOTE — PROGRESS NOTES
25-year-old female complaint of 2-month history of epigastric and right upper quadrant abdominal pain.  Patient reports the pain is almost exclusively postprandial and can last for several hours.  She reports nausea without vomiting.  She denies fevers and chills or changes in bowel habits.  Patient was recently seen in emergency room with similar symptoms and diagnosed with gallstones.  No other associated symptoms.    Patient Active Problem List   Diagnosis     Papanicolaou smear of cervix with low grade squamous intraepithelial lesion (LGSIL)     Hypothyroidism, unspecified type     Adjustment disorder with mixed anxiety and depressed mood       Past Medical History:   Diagnosis Date     Abnormal Pap smear of cervix 09/03/2019    See problem list     Thyroid disease        History reviewed. No pertinent surgical history.    Family History   Problem Relation Age of Onset     Rheumatoid Arthritis Mother      Thyroid Disease Maternal Grandmother        Social History     Tobacco Use     Smoking status: Never Smoker     Smokeless tobacco: Never Used   Substance Use Topics     Alcohol use: Never     Frequency: Monthly or less     Comment: once a month        History   Drug Use Unknown       Current Outpatient Medications   Medication Sig Dispense Refill     HYDROcodone-acetaminophen (NORCO) 5-325 MG tablet Take 1-2 tablets by mouth every 6 hours as needed for pain 20 tablet 0     escitalopram (LEXAPRO) 20 MG tablet Take 1 tablet (20 mg) by mouth daily 30 tablet 1     etonogestrel (IMPLANON/NEXPLANON) 68 MG IMPL Inject 68 mg Subcutaneous       HYDROcodone-acetaminophen (NORCO) 5-325 MG tablet Take 1 tablet by mouth every 6 hours as needed 7 tablet 0     levothyroxine (SYNTHROID/LEVOTHROID) 88 MCG tablet Take 1 tablet (88 mcg) by mouth daily Take one tablet daily but on Sunday take 2 tablets 94 tablet 0     omeprazole (PRILOSEC) 40 MG DR capsule Take 1 capsule (40 mg) by mouth daily 90 capsule 0       Allergies    Allergen Reactions     Amoxicillin Hives     Penicillins Hives      CBC  Recent Labs   Lab Test 05/05/20  1822   WBC 8.0   RBC 4.37   HGB 12.9   HCT 38.9   MCV 89   MCH 29.5   MCHC 33.2   RDW 11.9          BMP  Recent Labs   Lab Test 05/05/20  1822      POTASSIUM 4.1   NOEL 8.0*   CHLORIDE 108   CO2 27   BUN 14   CR 0.74   *       LFTs  Recent Labs   Lab Test 05/05/20  1822   PROTTOTAL 6.7*   ALBUMIN 3.3*   BILITOTAL 0.3   ALKPHOS 72   AST 15   ALT 20     Results for orders placed or performed during the hospital encounter of 05/05/20   Abdomen US, limited (RUQ only)    Narrative    ULTRASOUND ABDOMEN LIMITED 5/5/2020 7:05 PM    CLINICAL HISTORY: Epigastric and right-sided abdominal pain and  discomfort x 6 weeks with increasing discomfort. Evaluate for acute  hepatobiliary process.    TECHNIQUE: Limited abdominal ultrasound.    COMPARISON: None.    FINDINGS:    GALLBLADDER: Shadowing stones fill the gallbladder. No gallbladder  wall thickening or pericholecystic fluid. No tenderness with direct  transducer pressure over the gallbladder.    BILE DUCTS: There is no biliary dilatation. The common duct measures 3  mm.    LIVER: Normal where seen.    RIGHT KIDNEY: There is mild to moderate right hydronephrosis. Right  kidney size is normal.    PANCREAS: The visualized portions of the pancreas are normal.    No ascites.      Impression    IMPRESSION:  1.  Mild to moderate right hydronephrosis.  2.  Cholelithiasis without sonographic evidence of acute  cholecystitis.    DESIREE GRUBBS MD   CT Abdomen Pelvis w Contrast    Narrative    EXAM: CT ABDOMEN PELVIS W CONTRAST  LOCATION: [facility]  DATE/TIME: 5/5/2020 9:18 PM    INDICATION: Right-sided abdominal pain.  COMPARISON: None.  TECHNIQUE: CT scan of the abdomen and pelvis was performed following injection of IV contrast. Multiplanar reformats were obtained. Dose reduction techniques were used.  CONTRAST: 76 ml Isovue 370    FINDINGS:  "  LOWER CHEST: Normal.    HEPATOBILIARY: Normal.    PANCREAS: Normal.    SPLEEN: Normal.    ADRENAL GLANDS: Normal.    KIDNEYS/BLADDER: Normal.    BOWEL: Normal appendix.    LYMPH NODES: Normal.    VASCULATURE: Unremarkable.    PELVIC ORGANS: 3.8 x 3.6 cm cyst within the right ovary.    MUSCULOSKELETAL: Normal.      Impression    IMPRESSION:   1.  3.8 x 3.6 cm cyst right ovary.    2.  No appendicitis.       ROS  Constitutional - Denies fevers, weight loss, malaise, lethargy  Neuro - Denies tremors or seizures  Pulmon - Denies SOB, dyspnea, hemoptysis, chronic cough or use of an inhaler  CV - Denies CP, SOB, lower extremity edema, difficulty w/ stairs, has never used NTG  GI - Denies hematemesis, BRBPR, melena, chronic diarrhea or epigastric pain   - Denies hematuria, difficulty voiding, h/o STDs  Hematology - Denies blood clotting disorders, chronic anemias  Dermatology - No melanomas or skin cancers  Rheumatology - No h/o RA  Pysch - Denies depression, bipolar d/o or schizophrenia    Exam:/71 (BP Location: Right arm, Patient Position: Sitting, Cuff Size: Adult Regular)   Pulse 81   Temp 97.9  F (36.6  C) (Tympanic)   Ht 1.575 m (5' 2\")   Wt 70.3 kg (154 lb 15.7 oz)   BMI 28.35 kg/m      General - Alert and Oriented X4, NAD, well nourished  HEENT - Normocephalic, atraumatic, PERRLA, Nose midline, Throat without lesions  Neck - supple, no LAD, Thyroid normal, Carotids without bruits  Lungs - Clear to auscultation bilaterally with good inspiratory effort, no tactile fremitus  CV - Heart RRR, no lift's, thrills, murmurs, rubs, or gallops. Carotid, radial, and femoral pulses 2+ bilaterally  Abdomen - Soft, non-tender, +BS, no hepatosplenomegaly, no palpable masses  Neuro - Full ROM, Strength 5/5 and major muscle groups, sensation intact  Extremities - No cyanosis, clubbing or edema    Assessment and plan: 25-year-old female with symptomatic cholelithiasis.Patient is a good candidate for laparoscopic " cholecystectomy.  Risks benefits alternatives and complications were discussed with the patient including the possibility of infection bleeding or bile leak.  Patient understood and wished to proceed. PATIENT IS CLEARED FOR SURGERY.    Peter Hidalgo MD

## 2020-05-13 ENCOUNTER — OFFICE VISIT (OUTPATIENT)
Dept: FAMILY MEDICINE | Facility: CLINIC | Age: 25
End: 2020-05-13
Attending: SURGERY
Payer: COMMERCIAL

## 2020-05-13 ENCOUNTER — HOSPITAL ENCOUNTER (EMERGENCY)
Facility: CLINIC | Age: 25
Discharge: HOME | End: 2020-05-13
Attending: EMERGENCY MEDICINE | Admitting: EMERGENCY MEDICINE
Payer: COMMERCIAL

## 2020-05-13 VITALS
BODY MASS INDEX: 29.63 KG/M2 | OXYGEN SATURATION: 99 % | WEIGHT: 162 LBS | DIASTOLIC BLOOD PRESSURE: 85 MMHG | RESPIRATION RATE: 16 BRPM | TEMPERATURE: 97.8 F | SYSTOLIC BLOOD PRESSURE: 123 MMHG | HEART RATE: 77 BPM

## 2020-05-13 DIAGNOSIS — K80.20 CALCULUS OF GALLBLADDER WITHOUT CHOLECYSTITIS WITHOUT OBSTRUCTION: ICD-10-CM

## 2020-05-13 DIAGNOSIS — R10.13 ABDOMINAL PAIN, EPIGASTRIC: ICD-10-CM

## 2020-05-13 DIAGNOSIS — Z11.59 ENCOUNTER FOR SCREENING FOR OTHER VIRAL DISEASES: ICD-10-CM

## 2020-05-13 LAB
ALBUMIN SERPL-MCNC: 3.7 G/DL (ref 3.4–5)
ALBUMIN UR-MCNC: NEGATIVE MG/DL
ALP SERPL-CCNC: 76 U/L (ref 40–150)
ALT SERPL W P-5'-P-CCNC: 21 U/L (ref 0–50)
ANION GAP SERPL CALCULATED.3IONS-SCNC: 4 MMOL/L (ref 3–14)
APPEARANCE UR: ABNORMAL
AST SERPL W P-5'-P-CCNC: 16 U/L (ref 0–45)
BACTERIA #/AREA URNS HPF: ABNORMAL /HPF
BASOPHILS # BLD AUTO: 0.1 10E9/L (ref 0–0.2)
BASOPHILS NFR BLD AUTO: 0.6 %
BILIRUB SERPL-MCNC: 0.2 MG/DL (ref 0.2–1.3)
BILIRUB UR QL STRIP: NEGATIVE
BUN SERPL-MCNC: 15 MG/DL (ref 7–30)
CALCIUM SERPL-MCNC: 8.7 MG/DL (ref 8.5–10.1)
CHLORIDE SERPL-SCNC: 110 MMOL/L (ref 94–109)
CO2 SERPL-SCNC: 26 MMOL/L (ref 20–32)
COLOR UR AUTO: YELLOW
CREAT SERPL-MCNC: 0.77 MG/DL (ref 0.52–1.04)
DIFFERENTIAL METHOD BLD: NORMAL
EOSINOPHIL # BLD AUTO: 0.3 10E9/L (ref 0–0.7)
EOSINOPHIL NFR BLD AUTO: 3.4 %
ERYTHROCYTE [DISTWIDTH] IN BLOOD BY AUTOMATED COUNT: 11.9 % (ref 10–15)
GFR SERPL CREATININE-BSD FRML MDRD: >90 ML/MIN/{1.73_M2}
GLUCOSE SERPL-MCNC: 86 MG/DL (ref 70–99)
GLUCOSE UR STRIP-MCNC: NEGATIVE MG/DL
HCG UR QL: NEGATIVE
HCT VFR BLD AUTO: 42.3 % (ref 35–47)
HGB BLD-MCNC: 14.2 G/DL (ref 11.7–15.7)
HGB UR QL STRIP: NEGATIVE
IMM GRANULOCYTES # BLD: 0 10E9/L (ref 0–0.4)
IMM GRANULOCYTES NFR BLD: 0.2 %
KETONES UR STRIP-MCNC: NEGATIVE MG/DL
LEUKOCYTE ESTERASE UR QL STRIP: ABNORMAL
LIPASE SERPL-CCNC: 164 U/L (ref 73–393)
LYMPHOCYTES # BLD AUTO: 3.1 10E9/L (ref 0.8–5.3)
LYMPHOCYTES NFR BLD AUTO: 36.2 %
MCH RBC QN AUTO: 29.5 PG (ref 26.5–33)
MCHC RBC AUTO-ENTMCNC: 33.6 G/DL (ref 31.5–36.5)
MCV RBC AUTO: 88 FL (ref 78–100)
MONOCYTES # BLD AUTO: 0.5 10E9/L (ref 0–1.3)
MONOCYTES NFR BLD AUTO: 5.3 %
MUCOUS THREADS #/AREA URNS LPF: PRESENT /LPF
NEUTROPHILS # BLD AUTO: 4.6 10E9/L (ref 1.6–8.3)
NEUTROPHILS NFR BLD AUTO: 54.3 %
NITRATE UR QL: NEGATIVE
NRBC # BLD AUTO: 0 10*3/UL
NRBC BLD AUTO-RTO: 0 /100
PH UR STRIP: 6 PH (ref 5–7)
PLATELET # BLD AUTO: 240 10E9/L (ref 150–450)
POTASSIUM SERPL-SCNC: 4.1 MMOL/L (ref 3.4–5.3)
PROT SERPL-MCNC: 7.6 G/DL (ref 6.8–8.8)
RBC # BLD AUTO: 4.82 10E12/L (ref 3.8–5.2)
RBC #/AREA URNS AUTO: 1 /HPF (ref 0–2)
SODIUM SERPL-SCNC: 140 MMOL/L (ref 133–144)
SOURCE: ABNORMAL
SP GR UR STRIP: 1.02 (ref 1–1.03)
SQUAMOUS #/AREA URNS AUTO: 8 /HPF (ref 0–1)
T4 FREE SERPL-MCNC: 1.08 NG/DL (ref 0.76–1.46)
TSH SERPL DL<=0.005 MIU/L-ACNC: 6.56 MU/L (ref 0.4–4)
UROBILINOGEN UR STRIP-MCNC: 0 MG/DL (ref 0–2)
WBC # BLD AUTO: 8.5 10E9/L (ref 4–11)
WBC #/AREA URNS AUTO: 3 /HPF (ref 0–5)

## 2020-05-13 PROCEDURE — 99284 EMERGENCY DEPT VISIT MOD MDM: CPT | Mod: Z6 | Performed by: EMERGENCY MEDICINE

## 2020-05-13 PROCEDURE — 83690 ASSAY OF LIPASE: CPT | Performed by: EMERGENCY MEDICINE

## 2020-05-13 PROCEDURE — 99207 ZZC NO CHARGE NURSE ONLY: CPT

## 2020-05-13 PROCEDURE — U0003 INFECTIOUS AGENT DETECTION BY NUCLEIC ACID (DNA OR RNA); SEVERE ACUTE RESPIRATORY SYNDROME CORONAVIRUS 2 (SARS-COV-2) (CORONAVIRUS DISEASE [COVID-19]), AMPLIFIED PROBE TECHNIQUE, MAKING USE OF HIGH THROUGHPUT TECHNOLOGIES AS DESCRIBED BY CMS-2020-01-R: HCPCS | Mod: 90 | Performed by: SURGERY

## 2020-05-13 PROCEDURE — 25000132 ZZH RX MED GY IP 250 OP 250 PS 637: Performed by: EMERGENCY MEDICINE

## 2020-05-13 PROCEDURE — 81001 URINALYSIS AUTO W/SCOPE: CPT | Performed by: EMERGENCY MEDICINE

## 2020-05-13 PROCEDURE — 99283 EMERGENCY DEPT VISIT LOW MDM: CPT | Performed by: EMERGENCY MEDICINE

## 2020-05-13 PROCEDURE — 84443 ASSAY THYROID STIM HORMONE: CPT | Performed by: EMERGENCY MEDICINE

## 2020-05-13 PROCEDURE — 84439 ASSAY OF FREE THYROXINE: CPT | Performed by: EMERGENCY MEDICINE

## 2020-05-13 PROCEDURE — 85025 COMPLETE CBC W/AUTO DIFF WBC: CPT | Performed by: EMERGENCY MEDICINE

## 2020-05-13 PROCEDURE — 81025 URINE PREGNANCY TEST: CPT | Performed by: EMERGENCY MEDICINE

## 2020-05-13 PROCEDURE — 99000 SPECIMEN HANDLING OFFICE-LAB: CPT | Performed by: SURGERY

## 2020-05-13 PROCEDURE — 80053 COMPREHEN METABOLIC PANEL: CPT | Performed by: EMERGENCY MEDICINE

## 2020-05-13 RX ORDER — ACETAMINOPHEN 325 MG/1
975 TABLET ORAL ONCE
Status: COMPLETED | OUTPATIENT
Start: 2020-05-13 | End: 2020-05-13

## 2020-05-13 RX ADMIN — ACETAMINOPHEN 975 MG: 325 TABLET, FILM COATED ORAL at 17:22

## 2020-05-13 ASSESSMENT — ENCOUNTER SYMPTOMS
VOMITING: 0
SHORTNESS OF BREATH: 1
SORE THROAT: 0
COUGH: 0
BRUISES/BLEEDS EASILY: 0
FREQUENCY: 0
NECK STIFFNESS: 0
NECK PAIN: 0
DYSURIA: 0
ABDOMINAL PAIN: 1
NAUSEA: 1
APPETITE CHANGE: 1
DIARRHEA: 0
CHILLS: 0
DIAPHORESIS: 0
HEADACHES: 0
FEVER: 0

## 2020-05-13 NOTE — H&P (VIEW-ONLY)
History     Chief Complaint   Patient presents with     Abdominal Pain     has gall bladder surgery planned      HPI  Mora Maldonaod is a 25 year old female with history of cholelithiasis, adjustment disorder, GERD, hypothyroidism, abnormal Pap smear, who presents with abdominal pain.  She has had ongoing abdominal pain was evaluated in this department on 5/5/2020 and was found to have cholelithiasis without evidence of cholecystitis.  She had a follow-up appointment in the general surgery clinic yesterday and is scheduled for laparoscopic cholecystectomy in 2 days.  Her pain has become more frequent, more severe, and now is having pain on the left side of her abdomen is well.  She contacted her primary care physician who recommended that she come to the emergency department for further evaluation.  She describes her abdominal pain as sharp and achy, located in her epigastric area and right upper quadrant with radiation to her back.  Also has a crampy achy pain on the left side of her abdomen.  This pain is colicky in nature and radiates towards her groin.  She denies any fever, chills, she does endorse nausea but no vomiting.  No cough, headache, sore throat, dysuria, increased urinary frequency.  Has been have difficult time eating.  She tried having some small meal an hour prior to arrival emergency department post landed able to take a few bites.  She has been taking Tylenol at home for pain with minimal improvement and takes Vicodin at night.  Says Vicodin does not really help her pain but does allow her to get some sleep.  Currently taking levothyroxine and omeprazole.    The patient's PMHx, Surgical Hx, Allergies, and Medications were all reviewed with the patient.    CHART REVIEW================================================    EXAM: CT ABDOMEN PELVIS W CONTRAST  LOCATION: [facility]  DATE/TIME: 5/5/2020 9:18 PM    IMPRESSION  1.  3.8 x 3.6 cm cyst right ovary.  2.  No  appendicitis.    ULTRASOUND ABDOMEN LIMITED 5/5/2020 7:05 PM    IMPRESSION:  1.  Mild to moderate right hydronephrosis.  2.  Cholelithiasis without sonographic evidence of acute cholecystitis.    END CHART REVIEW ===========================================    Allergies:  Allergies   Allergen Reactions     Amoxicillin Hives     Penicillins Hives       Problem List:    Patient Active Problem List    Diagnosis Date Noted     Gallstones 05/12/2020     Priority: Medium     Added automatically from request for surgery [id number]       Adjustment disorder with mixed anxiety and depressed mood 01/20/2020     Priority: Medium     Hypothyroidism, unspecified type 09/09/2019     Priority: Medium     Papanicolaou smear of cervix with low grade squamous intraepithelial lesion (LGSIL) 09/03/2019     Priority: Medium     2014 NIL pap. (Found in Care Everywhere).  9/3/19 LSIL pap. Plan colp.   9/20/19 [facility] & ECC - Negative. Plan cotest in 1 year.             Past Medical History:    Past Medical History:   Diagnosis Date     Abnormal Pap smear of cervix 09/03/2019     Thyroid disease        Past Surgical History:    History reviewed. No pertinent surgical history.    Family History:    Family History   Problem Relation Age of Onset     Rheumatoid Arthritis Mother      Thyroid Disease Maternal Grandmother        Social History:  Marital Status:  Single [1]  Social History     Tobacco Use     Smoking status: Never Smoker     Smokeless tobacco: Never Used   Substance Use Topics     Alcohol use: Never     Frequency: Monthly or less     Comment: once a month     Drug use: Never        Medications:    escitalopram (LEXAPRO) 20 MG tablet  etonogestrel (IMPLANON/NEXPLANON) 68 MG IMPL  HYDROcodone-acetaminophen (NORCO) 5-325 MG tablet  HYDROcodone-acetaminophen (NORCO) 5-325 MG tablet  levothyroxine (SYNTHROID/LEVOTHROID) 88 MCG tablet  omeprazole (PRILOSEC) 40 MG DR capsule          Review of Systems   Constitutional: Positive for appetite  change. Negative for chills, diaphoresis and fever.   HENT: Negative for sore throat.    Eyes: Negative for visual disturbance.   Respiratory: Positive for shortness of breath. Negative for cough.    Cardiovascular: Negative for chest pain and leg swelling.   Gastrointestinal: Positive for abdominal pain and nausea. Negative for diarrhea and vomiting.   Genitourinary: Negative for dysuria and frequency.   Musculoskeletal: Negative for neck pain and neck stiffness.   Skin: Negative for rash.   Allergic/Immunologic: Negative for immunocompromised state.   Neurological: Negative for headaches.   Hematological: Does not bruise/bleed easily.       Physical Exam   BP: 121/84  Pulse: 88  Temp: 97.8  F (36.6  C)  Resp: 16  Weight: 73.5 kg (162 lb)  SpO2: 100 %    Physical Exam  GEN: Awake, alert, and cooperative. Appears mildly distressed, non-toxic.   HENT: MMM. External ears and nose normal bilaterally.  EYES: EOM intact. Conjunctiva clear. No discharge.   NECK: Supple, symmetric.  CV : Symmetric peripheral pulses. Brisk capillary refill.  PULM: Normal effort.   ABD: Soft, non-tender, non-distended. No rebound or guarding.   NEURO: Normal speech. Following commands. Answering questions and interacting appropriately.   EXT: No gross deformity. Warm and well perfused  INT: Warm. No diaphoresis. Normal color.        ED Course        Procedures           Critical Care time:  none               Results for orders placed or performed during the hospital encounter of 05/13/20 (from the past 24 hour(s))   CBC with platelets differential   Result Value Ref Range    WBC 8.5 4.0 - 11.0 10e9/L    RBC Count 4.82 3.8 - 5.2 10e12/L    Hemoglobin 14.2 11.7 - 15.7 g/dL    Hematocrit 42.3 35.0 - 47.0 %    MCV 88 78 - 100 fl    MCH 29.5 26.5 - 33.0 pg    MCHC 33.6 31.5 - 36.5 g/dL    RDW 11.9 10.0 - 15.0 %    Platelet Count 240 150 - 450 10e9/L    Diff Method Automated Method     % Neutrophils 54.3 %    % Lymphocytes 36.2 %    % Monocytes  5.3 %    % Eosinophils 3.4 %    % Basophils 0.6 %    % Immature Granulocytes 0.2 %    Nucleated RBCs 0 0 /100    Absolute Neutrophil 4.6 1.6 - 8.3 10e9/L    Absolute Lymphocytes 3.1 0.8 - 5.3 10e9/L    Absolute Monocytes 0.5 0.0 - 1.3 10e9/L    Absolute Eosinophils 0.3 0.0 - 0.7 10e9/L    Absolute Basophils 0.1 0.0 - 0.2 10e9/L    Abs Immature Granulocytes 0.0 0 - 0.4 10e9/L    Absolute Nucleated RBC 0.0    Comprehensive metabolic panel   Result Value Ref Range    Sodium 140 133 - 144 mmol/L    Potassium 4.1 3.4 - 5.3 mmol/L    Chloride 110 (H) 94 - 109 mmol/L    Carbon Dioxide 26 20 - 32 mmol/L    Anion Gap 4 3 - 14 mmol/L    Glucose 86 70 - 99 mg/dL    Urea Nitrogen 15 7 - 30 mg/dL    Creatinine 0.77 0.52 - 1.04 mg/dL    GFR Estimate >90 >60 mL/min/[1.73_m2]    GFR Estimate If Black >90 >60 mL/min/[1.73_m2]    Calcium 8.7 8.5 - 10.1 mg/dL    Bilirubin Total 0.2 0.2 - 1.3 mg/dL    Albumin 3.7 3.4 - 5.0 g/dL    Protein Total 7.6 6.8 - 8.8 g/dL    Alkaline Phosphatase 76 40 - 150 U/L    ALT 21 0 - 50 U/L    AST 16 0 - 45 U/L   Lipase   Result Value Ref Range    Lipase 164 73 - 393 U/L   UA reflex to Microscopic   Result Value Ref Range    Color Urine Yellow     Appearance Urine Slightly Cloudy     Glucose Urine Negative NEG^Negative mg/dL    Bilirubin Urine Negative NEG^Negative    Ketones Urine Negative NEG^Negative mg/dL    Specific Gravity Urine 1.019 1.003 - 1.035    Blood Urine Negative NEG^Negative    pH Urine 6.0 5.0 - 7.0 pH    Protein Albumin Urine Negative NEG^Negative mg/dL    Urobilinogen mg/dL 0.0 0.0 - 2.0 mg/dL    Nitrite Urine Negative NEG^Negative    Leukocyte Esterase Urine Trace (A) NEG^Negative    Source Midstream Urine     RBC Urine 1 0 - 2 /HPF    WBC Urine 3 0 - 5 /HPF    Bacteria Urine Few (A) NEG^Negative /HPF    Squamous Epithelial /HPF Urine 8 (H) 0 - 1 /HPF    Mucous Urine Present (A) NEG^Negative /LPF   HCG qualitative urine (UPT)   Result Value Ref Range    HCG Qual Urine Negative  NEG^Negative   TSH with free T4 reflex   Result Value Ref Range    TSH 6.56 (H) 0.40 - 4.00 mU/L   T4 free   Result Value Ref Range    T4 Free 1.08 0.76 - 1.46 ng/dL       Medications   acetaminophen (TYLENOL) tablet 975 mg (975 mg Oral Given 5/13/20 1722)       Assessments & Plan (with Medical Decision Making)   25 year old female with past medical history of GERD, hypothyroidism, adjustment disorder, cholelithiasis who presents for worsening abdominal pain.  She was seen for similar complaint 1 week ago and was found to have cholelithiasis without evidence of acute cholecystitis.  She was evaluated an outpatient with general surgery and has a laparoscopic cholecystectomy only scheduled in 2 days.  On arrival to Emergency Department, vital signs were within normal limits. Abdominal exam is reassuring. Will check labs to check for evidence of acute cholecystis, hepatic injury or systemic infection. I personally reviewed imaging from 5/5. I did not see any signs of renal or ureterolithiasis on CT. she did have noted of hydronephrosis on her ultrasound did not appreciate on the CT scan and an ovarian cyst on the right.  Neither of these would explain any discomfort she is having on the left.  CBC and lipase within normal limits.  CMP notable only for modest chloride elevation of 110.  TSH elevated to 6.56, T4 free within normal limits at 1.08.  Urine without any evidence of acute infection or hematuria, specimen appears contaminated.  UPT negative.  Her pain improved with acetaminophen.  Given her benign abdominal exam I feel she is appropriate for outpatient management with plan for laparoscopic cholecystectomy in 2 days.  Strict ED return precautions discussed with patient.  She expresses agreement understanding of plan and discharged improved condition.    I have reviewed the nursing notes.         Discharge Medication List as of 5/13/2020  6:29 PM          Final diagnoses:   Calculus of gallbladder without  cholecystitis without obstruction   Abdominal pain, epigastric     Tutu Pressley MD    5/13/2020   [facility] EMERGENCY DEPARTMENT    Disclaimer: This note consists of words and symbols derived from keyboarding and dictation using voice recognition software.  As a result, there may be errors that have gone undetected.  Please consider this when interpreting information found in this note.             Tutu Pressley MD  05/13/20 1497

## 2020-05-13 NOTE — ED AVS SNAPSHOT
[facility] Emergency Department  [address] [address]  Phone:  [phone]  Fax:  [phone]                                    Mora Maldonado   MRN: [id number]    Department:  [facility] Emergency Department   Date of Visit:  5/13/2020           After Visit Summary Signature Page    I have received my discharge instructions, and my questions have been answered. I have discussed any challenges I see with this plan with the nurse or doctor.    ..........................................................................................................................................  Patient/Patient Representative Signature      ..........................................................................................................................................  Patient Representative Print Name and Relationship to Patient    ..................................................               ................................................  Date                                   Time    ..........................................................................................................................................  Reviewed by Signature/Title    ...................................................              ..............................................  Date                                               Time          22EPIC Rev 08/18

## 2020-05-13 NOTE — DISCHARGE INSTRUCTIONS
Your evaluation in the Emergency Department was reassuring. Please keep your plans for Surgery this Friday.

## 2020-05-13 NOTE — PROGRESS NOTES
Patient presented for pre op covid test. NP swab was obtained by Lynette Pavon CMA. Eran Gill CMA  Assisted. - Eran LEA CMA

## 2020-05-13 NOTE — ED PROVIDER NOTES
History     Chief Complaint   Patient presents with     Abdominal Pain     has gall bladder surgery planned      HPI  Mora Maldonado is a 25 year old female with history of cholelithiasis, adjustment disorder, GERD, hypothyroidism, abnormal Pap smear, who presents with abdominal pain.  She has had ongoing abdominal pain was evaluated in this department on 5/5/2020 and was found to have cholelithiasis without evidence of cholecystitis.  She had a follow-up appointment in the general surgery clinic yesterday and is scheduled for laparoscopic cholecystectomy in 2 days.  Her pain has become more frequent, more severe, and now is having pain on the left side of her abdomen is well.  She contacted her primary care physician who recommended that she come to the emergency department for further evaluation.  She describes her abdominal pain as sharp and achy, located in her epigastric area and right upper quadrant with radiation to her back.  Also has a crampy achy pain on the left side of her abdomen.  This pain is colicky in nature and radiates towards her groin.  She denies any fever, chills, she does endorse nausea but no vomiting.  No cough, headache, sore throat, dysuria, increased urinary frequency.  Has been have difficult time eating.  She tried having some small meal an hour prior to arrival emergency department post landed able to take a few bites.  She has been taking Tylenol at home for pain with minimal improvement and takes Vicodin at night.  Says Vicodin does not really help her pain but does allow her to get some sleep.  Currently taking levothyroxine and omeprazole.    The patient's PMHx, Surgical Hx, Allergies, and Medications were all reviewed with the patient.    CHART REVIEW================================================    EXAM: CT ABDOMEN PELVIS W CONTRAST  LOCATION: [facility]  DATE/TIME: 5/5/2020 9:18 PM    IMPRESSION  1.  3.8 x 3.6 cm cyst right ovary.  2.  No  appendicitis.    ULTRASOUND ABDOMEN LIMITED 5/5/2020 7:05 PM    IMPRESSION:  1.  Mild to moderate right hydronephrosis.  2.  Cholelithiasis without sonographic evidence of acute cholecystitis.    END CHART REVIEW ===========================================    Allergies:  Allergies   Allergen Reactions     Amoxicillin Hives     Penicillins Hives       Problem List:    Patient Active Problem List    Diagnosis Date Noted     Gallstones 05/12/2020     Priority: Medium     Added automatically from request for surgery [id number]       Adjustment disorder with mixed anxiety and depressed mood 01/20/2020     Priority: Medium     Hypothyroidism, unspecified type 09/09/2019     Priority: Medium     Papanicolaou smear of cervix with low grade squamous intraepithelial lesion (LGSIL) 09/03/2019     Priority: Medium     2014 NIL pap. (Found in Care Everywhere).  9/3/19 LSIL pap. Plan colp.   9/20/19 [facility] Bx & ECC - Negative. Plan cotest in 1 year.             Past Medical History:    Past Medical History:   Diagnosis Date     Abnormal Pap smear of cervix 09/03/2019     Thyroid disease        Past Surgical History:    History reviewed. No pertinent surgical history.    Family History:    Family History   Problem Relation Age of Onset     Rheumatoid Arthritis Mother      Thyroid Disease Maternal Grandmother        Social History:  Marital Status:  Single [1]  Social History     Tobacco Use     Smoking status: Never Smoker     Smokeless tobacco: Never Used   Substance Use Topics     Alcohol use: Never     Frequency: Monthly or less     Comment: once a month     Drug use: Never        Medications:    escitalopram (LEXAPRO) 20 MG tablet  etonogestrel (IMPLANON/NEXPLANON) 68 MG IMPL  HYDROcodone-acetaminophen (NORCO) 5-325 MG tablet  HYDROcodone-acetaminophen (NORCO) 5-325 MG tablet  levothyroxine (SYNTHROID/LEVOTHROID) 88 MCG tablet  omeprazole (PRILOSEC) 40 MG DR capsule          Review of Systems   Constitutional: Positive for appetite  change. Negative for chills, diaphoresis and fever.   HENT: Negative for sore throat.    Eyes: Negative for visual disturbance.   Respiratory: Positive for shortness of breath. Negative for cough.    Cardiovascular: Negative for chest pain and leg swelling.   Gastrointestinal: Positive for abdominal pain and nausea. Negative for diarrhea and vomiting.   Genitourinary: Negative for dysuria and frequency.   Musculoskeletal: Negative for neck pain and neck stiffness.   Skin: Negative for rash.   Allergic/Immunologic: Negative for immunocompromised state.   Neurological: Negative for headaches.   Hematological: Does not bruise/bleed easily.       Physical Exam   BP: 121/84  Pulse: 88  Temp: 97.8  F (36.6  C)  Resp: 16  Weight: 73.5 kg (162 lb)  SpO2: 100 %    Physical Exam  GEN: Awake, alert, and cooperative. Appears mildly distressed, non-toxic.   HENT: MMM. External ears and nose normal bilaterally.  EYES: EOM intact. Conjunctiva clear. No discharge.   NECK: Supple, symmetric.  CV : Symmetric peripheral pulses. Brisk capillary refill.  PULM: Normal effort.   ABD: Soft, non-tender, non-distended. No rebound or guarding.   NEURO: Normal speech. Following commands. Answering questions and interacting appropriately.   EXT: No gross deformity. Warm and well perfused  INT: Warm. No diaphoresis. Normal color.        ED Course        Procedures           Critical Care time:  none               Results for orders placed or performed during the hospital encounter of 05/13/20 (from the past 24 hour(s))   CBC with platelets differential   Result Value Ref Range    WBC 8.5 4.0 - 11.0 10e9/L    RBC Count 4.82 3.8 - 5.2 10e12/L    Hemoglobin 14.2 11.7 - 15.7 g/dL    Hematocrit 42.3 35.0 - 47.0 %    MCV 88 78 - 100 fl    MCH 29.5 26.5 - 33.0 pg    MCHC 33.6 31.5 - 36.5 g/dL    RDW 11.9 10.0 - 15.0 %    Platelet Count 240 150 - 450 10e9/L    Diff Method Automated Method     % Neutrophils 54.3 %    % Lymphocytes 36.2 %    % Monocytes  5.3 %    % Eosinophils 3.4 %    % Basophils 0.6 %    % Immature Granulocytes 0.2 %    Nucleated RBCs 0 0 /100    Absolute Neutrophil 4.6 1.6 - 8.3 10e9/L    Absolute Lymphocytes 3.1 0.8 - 5.3 10e9/L    Absolute Monocytes 0.5 0.0 - 1.3 10e9/L    Absolute Eosinophils 0.3 0.0 - 0.7 10e9/L    Absolute Basophils 0.1 0.0 - 0.2 10e9/L    Abs Immature Granulocytes 0.0 0 - 0.4 10e9/L    Absolute Nucleated RBC 0.0    Comprehensive metabolic panel   Result Value Ref Range    Sodium 140 133 - 144 mmol/L    Potassium 4.1 3.4 - 5.3 mmol/L    Chloride 110 (H) 94 - 109 mmol/L    Carbon Dioxide 26 20 - 32 mmol/L    Anion Gap 4 3 - 14 mmol/L    Glucose 86 70 - 99 mg/dL    Urea Nitrogen 15 7 - 30 mg/dL    Creatinine 0.77 0.52 - 1.04 mg/dL    GFR Estimate >90 >60 mL/min/[1.73_m2]    GFR Estimate If Black >90 >60 mL/min/[1.73_m2]    Calcium 8.7 8.5 - 10.1 mg/dL    Bilirubin Total 0.2 0.2 - 1.3 mg/dL    Albumin 3.7 3.4 - 5.0 g/dL    Protein Total 7.6 6.8 - 8.8 g/dL    Alkaline Phosphatase 76 40 - 150 U/L    ALT 21 0 - 50 U/L    AST 16 0 - 45 U/L   Lipase   Result Value Ref Range    Lipase 164 73 - 393 U/L   UA reflex to Microscopic   Result Value Ref Range    Color Urine Yellow     Appearance Urine Slightly Cloudy     Glucose Urine Negative NEG^Negative mg/dL    Bilirubin Urine Negative NEG^Negative    Ketones Urine Negative NEG^Negative mg/dL    Specific Gravity Urine 1.019 1.003 - 1.035    Blood Urine Negative NEG^Negative    pH Urine 6.0 5.0 - 7.0 pH    Protein Albumin Urine Negative NEG^Negative mg/dL    Urobilinogen mg/dL 0.0 0.0 - 2.0 mg/dL    Nitrite Urine Negative NEG^Negative    Leukocyte Esterase Urine Trace (A) NEG^Negative    Source Midstream Urine     RBC Urine 1 0 - 2 /HPF    WBC Urine 3 0 - 5 /HPF    Bacteria Urine Few (A) NEG^Negative /HPF    Squamous Epithelial /HPF Urine 8 (H) 0 - 1 /HPF    Mucous Urine Present (A) NEG^Negative /LPF   HCG qualitative urine (UPT)   Result Value Ref Range    HCG Qual Urine Negative  NEG^Negative   TSH with free T4 reflex   Result Value Ref Range    TSH 6.56 (H) 0.40 - 4.00 mU/L   T4 free   Result Value Ref Range    T4 Free 1.08 0.76 - 1.46 ng/dL       Medications   acetaminophen (TYLENOL) tablet 975 mg (975 mg Oral Given 5/13/20 1722)       Assessments & Plan (with Medical Decision Making)   25 year old female with past medical history of GERD, hypothyroidism, adjustment disorder, cholelithiasis who presents for worsening abdominal pain.  She was seen for similar complaint 1 week ago and was found to have cholelithiasis without evidence of acute cholecystitis.  She was evaluated an outpatient with general surgery and has a laparoscopic cholecystectomy only scheduled in 2 days.  On arrival to Emergency Department, vital signs were within normal limits. Abdominal exam is reassuring. Will check labs to check for evidence of acute cholecystis, hepatic injury or systemic infection. I personally reviewed imaging from 5/5. I did not see any signs of renal or ureterolithiasis on CT. she did have noted of hydronephrosis on her ultrasound did not appreciate on the CT scan and an ovarian cyst on the right.  Neither of these would explain any discomfort she is having on the left.  CBC and lipase within normal limits.  CMP notable only for modest chloride elevation of 110.  TSH elevated to 6.56, T4 free within normal limits at 1.08.  Urine without any evidence of acute infection or hematuria, specimen appears contaminated.  UPT negative.  Her pain improved with acetaminophen.  Given her benign abdominal exam I feel she is appropriate for outpatient management with plan for laparoscopic cholecystectomy in 2 days.  Strict ED return precautions discussed with patient.  She expresses agreement understanding of plan and discharged improved condition.    I have reviewed the nursing notes.         Discharge Medication List as of 5/13/2020  6:29 PM          Final diagnoses:   Calculus of gallbladder without  cholecystitis without obstruction   Abdominal pain, epigastric     Tutu Pressley MD    5/13/2020   [facility] EMERGENCY DEPARTMENT    Disclaimer: This note consists of words and symbols derived from keyboarding and dictation using voice recognition software.  As a result, there may be errors that have gone undetected.  Please consider this when interpreting information found in this note.             Tutu Pressley MD  05/13/20 5398

## 2020-05-13 NOTE — ED NOTES
Pt here with abdominal pain. Scheduled to have gallbladder removed on Friday. Endorses nausea, denies vomiting. Reports regular bowel movements.

## 2020-05-14 ENCOUNTER — ANESTHESIA EVENT (OUTPATIENT)
Dept: SURGERY | Facility: CLINIC | Age: 25
End: 2020-05-14
Payer: COMMERCIAL

## 2020-05-14 LAB
SARS-COV-2 RNA SPEC QL NAA+PROBE: NOT DETECTED
SPECIMEN SOURCE: NORMAL

## 2020-05-14 RX ORDER — ESCITALOPRAM OXALATE 20 MG/1
20 TABLET ORAL DAILY
Qty: 30 TABLET | Refills: 1 | Status: SHIPPED | OUTPATIENT
Start: 2020-05-14 | End: 2020-07-07

## 2020-05-14 NOTE — ANESTHESIA PREPROCEDURE EVALUATION
Anesthesia Pre-Procedure Evaluation    Patient: Mora Maldonado   MRN: [id number] : 1995          Preoperative Diagnosis: Gallstones [K80.20]    Procedure(s):  laparoscopic cholecystectomy    Past Medical History:   Diagnosis Date     Abnormal Pap smear of cervix 2019    See problem list     Thyroid disease      History reviewed. No pertinent surgical history.    Anesthesia Evaluation     . Pt has not had prior anesthetic            ROS/MED HX    ENT/Pulmonary:  - neg pulmonary ROS     Neurologic:  - neg neurologic ROS     Cardiovascular:  - neg cardiovascular ROS       METS/Exercise Tolerance:  >4 METS   Hematologic:  - neg hematologic  ROS       Musculoskeletal:  - neg musculoskeletal ROS       GI/Hepatic:     (+) GERD Asymptomatic on medication, cholecystitis/cholelithiasis,       Renal/Genitourinary:  - ROS Renal section negative       Endo:     (+) thyroid problem hypothyroidism, Obesity, .      Psychiatric:     (+) psychiatric history anxiety and depression      Infectious Disease:  - neg infectious disease ROS       Malignancy:      - no malignancy   Other:    (+) No chance of pregnancy   - neg other ROS                      Physical Exam  Normal systems: cardiovascular, pulmonary and dental    Airway   Mallampati: II  TM distance: >3 FB  Neck ROM: full    Dental     Cardiovascular       Pulmonary             Lab Results   Component Value Date    WBC 8.5 2020    HGB 14.2 2020    HCT 42.3 2020     2020    CRP <2.9 2019    SED 9 2019     2020    POTASSIUM 4.1 2020    CHLORIDE 110 (H) 2020    CO2 26 2020    BUN 15 2020    CR 0.77 2020    GLC 86 2020    NOEL 8.7 2020    ALBUMIN 3.7 2020    PROTTOTAL 7.6 2020    ALT 21 2020    AST 16 2020    ALKPHOS 76 2020    BILITOTAL 0.2 2020    LIPASE 164 2020    AMYLASE 81 2020    TSH 6.56 (H) 2020    T4 1.08  "05/13/2020    HCG Negative 05/13/2020       Preop Vitals  BP Readings from Last 3 Encounters:   05/13/20 123/85   05/12/20 118/71   05/05/20 110/70    Pulse Readings from Last 3 Encounters:   05/13/20 77   05/12/20 81   05/05/20 91      Resp Readings from Last 3 Encounters:   05/13/20 16   05/05/20 20   05/04/20 18    SpO2 Readings from Last 3 Encounters:   05/13/20 99%   05/05/20 98%   05/04/20 100%      Temp Readings from Last 1 Encounters:   05/13/20 36.6  C (97.8  F) (Oral)    Ht Readings from Last 1 Encounters:   05/12/20 1.575 m (5' 2\")      Wt Readings from Last 1 Encounters:   05/13/20 73.5 kg (162 lb)    Estimated body mass index is 29.63 kg/m  as calculated from the following:    Height as of 5/12/20: 1.575 m (5' 2\").    Weight as of 5/13/20: 73.5 kg (162 lb).       Anesthesia Plan      History & Physical Review  History and physical reviewed and following examination; no interval change.    ASA Status:  2 .    NPO Status:  > 6 hours    Plan for General with Intravenous induction. Maintenance will be Inhalation and Balanced.    PONV prophylaxis:  Ondansetron (or other 5HT-3) and Dexamethasone or Solumedrol         Postoperative Care  Postoperative pain management:  IV analgesics and Oral pain medications.      Consents  Anesthetic plan, risks, benefits and alternatives discussed with:  Patient..                 SHAWNEE Tran CRNA  "

## 2020-05-15 ENCOUNTER — ANESTHESIA (OUTPATIENT)
Dept: SURGERY | Facility: CLINIC | Age: 25
End: 2020-05-15
Payer: COMMERCIAL

## 2020-05-15 ENCOUNTER — HOSPITAL ENCOUNTER (OUTPATIENT)
Facility: CLINIC | Age: 25
Discharge: HOME | End: 2020-05-15
Attending: SURGERY | Admitting: SURGERY
Payer: COMMERCIAL

## 2020-05-15 VITALS
HEIGHT: 62 IN | HEART RATE: 89 BPM | RESPIRATION RATE: 15 BRPM | BODY MASS INDEX: 29.44 KG/M2 | DIASTOLIC BLOOD PRESSURE: 59 MMHG | OXYGEN SATURATION: 94 % | TEMPERATURE: 98.4 F | WEIGHT: 160 LBS | SYSTOLIC BLOOD PRESSURE: 102 MMHG

## 2020-05-15 DIAGNOSIS — G89.18 POSTOPERATIVE PAIN: Primary | ICD-10-CM

## 2020-05-15 DIAGNOSIS — K80.20 GALLSTONES: ICD-10-CM

## 2020-05-15 PROCEDURE — 25800030 ZZH RX IP 258 OP 636: Performed by: NURSE ANESTHETIST, CERTIFIED REGISTERED

## 2020-05-15 PROCEDURE — 25000566 ZZH SEVOFLURANE, EA 15 MIN: Performed by: SURGERY

## 2020-05-15 PROCEDURE — 36000058 ZZH SURGERY LEVEL 3 EA 15 ADDTL MIN: Performed by: SURGERY

## 2020-05-15 PROCEDURE — 25000128 H RX IP 250 OP 636: Performed by: NURSE ANESTHETIST, CERTIFIED REGISTERED

## 2020-05-15 PROCEDURE — 40000305 ZZH STATISTIC PRE PROC ASSESS I: Performed by: SURGERY

## 2020-05-15 PROCEDURE — 37000008 ZZH ANESTHESIA TECHNICAL FEE, 1ST 30 MIN: Performed by: SURGERY

## 2020-05-15 PROCEDURE — 25000132 ZZH RX MED GY IP 250 OP 250 PS 637: Performed by: NURSE ANESTHETIST, CERTIFIED REGISTERED

## 2020-05-15 PROCEDURE — 25000128 H RX IP 250 OP 636: Performed by: SURGERY

## 2020-05-15 PROCEDURE — 71000014 ZZH RECOVERY PHASE 1 LEVEL 2 FIRST HR: Performed by: SURGERY

## 2020-05-15 PROCEDURE — 88304 TISSUE EXAM BY PATHOLOGIST: CPT | Mod: 26 | Performed by: SURGERY

## 2020-05-15 PROCEDURE — 25000125 ZZHC RX 250: Performed by: SURGERY

## 2020-05-15 PROCEDURE — 27210794 ZZH OR GENERAL SUPPLY STERILE: Performed by: SURGERY

## 2020-05-15 PROCEDURE — 71000027 ZZH RECOVERY PHASE 2 EACH 15 MINS: Performed by: SURGERY

## 2020-05-15 PROCEDURE — 36000056 ZZH SURGERY LEVEL 3 1ST 30 MIN: Performed by: SURGERY

## 2020-05-15 PROCEDURE — 25000132 ZZH RX MED GY IP 250 OP 250 PS 637: Performed by: SURGERY

## 2020-05-15 PROCEDURE — 25000125 ZZHC RX 250: Performed by: NURSE ANESTHETIST, CERTIFIED REGISTERED

## 2020-05-15 PROCEDURE — 88304 TISSUE EXAM BY PATHOLOGIST: CPT | Performed by: SURGERY

## 2020-05-15 PROCEDURE — 27110028 ZZH OR GENERAL SUPPLY NON-STERILE: Performed by: SURGERY

## 2020-05-15 PROCEDURE — 37000009 ZZH ANESTHESIA TECHNICAL FEE, EACH ADDTL 15 MIN: Performed by: SURGERY

## 2020-05-15 PROCEDURE — 47562 LAPAROSCOPIC CHOLECYSTECTOMY: CPT | Performed by: SURGERY

## 2020-05-15 RX ORDER — BUPIVACAINE HYDROCHLORIDE AND EPINEPHRINE 5; 5 MG/ML; UG/ML
INJECTION, SOLUTION PERINEURAL PRN
Status: DISCONTINUED | OUTPATIENT
Start: 2020-05-15 | End: 2020-05-15 | Stop reason: HOSPADM

## 2020-05-15 RX ORDER — GABAPENTIN 300 MG/1
300 CAPSULE ORAL ONCE
Status: COMPLETED | OUTPATIENT
Start: 2020-05-15 | End: 2020-05-15

## 2020-05-15 RX ORDER — HYDROMORPHONE HYDROCHLORIDE 1 MG/ML
.3-.5 INJECTION, SOLUTION INTRAMUSCULAR; INTRAVENOUS; SUBCUTANEOUS EVERY 10 MIN PRN
Status: DISCONTINUED | OUTPATIENT
Start: 2020-05-15 | End: 2020-05-15 | Stop reason: HOSPADM

## 2020-05-15 RX ORDER — SODIUM CHLORIDE, SODIUM LACTATE, POTASSIUM CHLORIDE, CALCIUM CHLORIDE 600; 310; 30; 20 MG/100ML; MG/100ML; MG/100ML; MG/100ML
INJECTION, SOLUTION INTRAVENOUS CONTINUOUS
Status: DISCONTINUED | OUTPATIENT
Start: 2020-05-15 | End: 2020-05-15 | Stop reason: HOSPADM

## 2020-05-15 RX ORDER — METOCLOPRAMIDE 10 MG/1
10 TABLET ORAL EVERY 6 HOURS PRN
Status: DISCONTINUED | OUTPATIENT
Start: 2020-05-15 | End: 2020-05-15 | Stop reason: HOSPADM

## 2020-05-15 RX ORDER — DEXAMETHASONE SODIUM PHOSPHATE 4 MG/ML
INJECTION, SOLUTION INTRA-ARTICULAR; INTRALESIONAL; INTRAMUSCULAR; INTRAVENOUS; SOFT TISSUE PRN
Status: DISCONTINUED | OUTPATIENT
Start: 2020-05-15 | End: 2020-05-15

## 2020-05-15 RX ORDER — KETOROLAC TROMETHAMINE 30 MG/ML
INJECTION, SOLUTION INTRAMUSCULAR; INTRAVENOUS PRN
Status: DISCONTINUED | OUTPATIENT
Start: 2020-05-15 | End: 2020-05-15

## 2020-05-15 RX ORDER — MEPERIDINE HYDROCHLORIDE 50 MG/ML
INJECTION INTRAMUSCULAR; INTRAVENOUS; SUBCUTANEOUS PRN
Status: DISCONTINUED | OUTPATIENT
Start: 2020-05-15 | End: 2020-05-15

## 2020-05-15 RX ORDER — CEFAZOLIN SODIUM 1 G/50ML
1 INJECTION, SOLUTION INTRAVENOUS SEE ADMIN INSTRUCTIONS
Status: DISCONTINUED | OUTPATIENT
Start: 2020-05-15 | End: 2020-05-15 | Stop reason: HOSPADM

## 2020-05-15 RX ORDER — NALOXONE HYDROCHLORIDE 0.4 MG/ML
.1-.4 INJECTION, SOLUTION INTRAMUSCULAR; INTRAVENOUS; SUBCUTANEOUS
Status: DISCONTINUED | OUTPATIENT
Start: 2020-05-15 | End: 2020-05-15 | Stop reason: HOSPADM

## 2020-05-15 RX ORDER — CEFAZOLIN SODIUM 2 G/100ML
2 INJECTION, SOLUTION INTRAVENOUS
Status: COMPLETED | OUTPATIENT
Start: 2020-05-15 | End: 2020-05-15

## 2020-05-15 RX ORDER — ONDANSETRON 2 MG/ML
4 INJECTION INTRAMUSCULAR; INTRAVENOUS EVERY 30 MIN PRN
Status: DISCONTINUED | OUTPATIENT
Start: 2020-05-15 | End: 2020-05-15 | Stop reason: HOSPADM

## 2020-05-15 RX ORDER — MEPERIDINE HYDROCHLORIDE 25 MG/ML
12.5 INJECTION INTRAMUSCULAR; INTRAVENOUS; SUBCUTANEOUS
Status: DISCONTINUED | OUTPATIENT
Start: 2020-05-15 | End: 2020-05-15 | Stop reason: HOSPADM

## 2020-05-15 RX ORDER — FENTANYL CITRATE 50 UG/ML
25-50 INJECTION, SOLUTION INTRAMUSCULAR; INTRAVENOUS
Status: DISCONTINUED | OUTPATIENT
Start: 2020-05-15 | End: 2020-05-15 | Stop reason: HOSPADM

## 2020-05-15 RX ORDER — PHENYLEPHRINE HYDROCHLORIDE 10 MG/ML
INJECTION INTRAVENOUS PRN
Status: DISCONTINUED | OUTPATIENT
Start: 2020-05-15 | End: 2020-05-15

## 2020-05-15 RX ORDER — LIDOCAINE HYDROCHLORIDE 10 MG/ML
INJECTION, SOLUTION INFILTRATION; PERINEURAL PRN
Status: DISCONTINUED | OUTPATIENT
Start: 2020-05-15 | End: 2020-05-15

## 2020-05-15 RX ORDER — METOCLOPRAMIDE HYDROCHLORIDE 5 MG/ML
10 INJECTION INTRAMUSCULAR; INTRAVENOUS EVERY 6 HOURS PRN
Status: DISCONTINUED | OUTPATIENT
Start: 2020-05-15 | End: 2020-05-15 | Stop reason: HOSPADM

## 2020-05-15 RX ORDER — HYDROCODONE BITARTRATE AND ACETAMINOPHEN 5; 325 MG/1; MG/1
1-2 TABLET ORAL EVERY 4 HOURS PRN
Status: DISCONTINUED | OUTPATIENT
Start: 2020-05-15 | End: 2020-05-15 | Stop reason: HOSPADM

## 2020-05-15 RX ORDER — PROPOFOL 10 MG/ML
INJECTION, EMULSION INTRAVENOUS PRN
Status: DISCONTINUED | OUTPATIENT
Start: 2020-05-15 | End: 2020-05-15

## 2020-05-15 RX ORDER — ONDANSETRON 2 MG/ML
INJECTION INTRAMUSCULAR; INTRAVENOUS PRN
Status: DISCONTINUED | OUTPATIENT
Start: 2020-05-15 | End: 2020-05-15

## 2020-05-15 RX ORDER — ONDANSETRON 4 MG/1
4 TABLET, ORALLY DISINTEGRATING ORAL EVERY 30 MIN PRN
Status: DISCONTINUED | OUTPATIENT
Start: 2020-05-15 | End: 2020-05-15 | Stop reason: HOSPADM

## 2020-05-15 RX ORDER — ALBUTEROL SULFATE 0.83 MG/ML
2.5 SOLUTION RESPIRATORY (INHALATION) EVERY 4 HOURS PRN
Status: DISCONTINUED | OUTPATIENT
Start: 2020-05-15 | End: 2020-05-15 | Stop reason: HOSPADM

## 2020-05-15 RX ORDER — DIMENHYDRINATE 50 MG/ML
25 INJECTION, SOLUTION INTRAMUSCULAR; INTRAVENOUS
Status: DISCONTINUED | OUTPATIENT
Start: 2020-05-15 | End: 2020-05-15 | Stop reason: HOSPADM

## 2020-05-15 RX ORDER — FENTANYL CITRATE 50 UG/ML
INJECTION, SOLUTION INTRAMUSCULAR; INTRAVENOUS PRN
Status: DISCONTINUED | OUTPATIENT
Start: 2020-05-15 | End: 2020-05-15

## 2020-05-15 RX ORDER — LIDOCAINE 40 MG/G
CREAM TOPICAL
Status: DISCONTINUED | OUTPATIENT
Start: 2020-05-15 | End: 2020-05-15 | Stop reason: HOSPADM

## 2020-05-15 RX ORDER — HYDROCODONE BITARTRATE AND ACETAMINOPHEN 5; 325 MG/1; MG/1
1-2 TABLET ORAL EVERY 6 HOURS PRN
Qty: 20 TABLET | Refills: 0 | Status: SHIPPED | OUTPATIENT
Start: 2020-05-15 | End: 2020-08-08

## 2020-05-15 RX ORDER — GLYCOPYRROLATE 0.2 MG/ML
INJECTION, SOLUTION INTRAMUSCULAR; INTRAVENOUS PRN
Status: DISCONTINUED | OUTPATIENT
Start: 2020-05-15 | End: 2020-05-15

## 2020-05-15 RX ORDER — ACETAMINOPHEN 325 MG/1
975 TABLET ORAL ONCE
Status: COMPLETED | OUTPATIENT
Start: 2020-05-15 | End: 2020-05-15

## 2020-05-15 RX ADMIN — PHENYLEPHRINE HYDROCHLORIDE 100 MCG: 10 INJECTION INTRAVENOUS at 13:46

## 2020-05-15 RX ADMIN — DEXAMETHASONE SODIUM PHOSPHATE 4 MG: 4 INJECTION, SOLUTION INTRA-ARTICULAR; INTRALESIONAL; INTRAMUSCULAR; INTRAVENOUS; SOFT TISSUE at 13:28

## 2020-05-15 RX ADMIN — FENTANYL CITRATE 100 MCG: 50 INJECTION, SOLUTION INTRAMUSCULAR; INTRAVENOUS at 13:38

## 2020-05-15 RX ADMIN — MEPERIDINE HYDROCHLORIDE 50 MG: 50 INJECTION, SOLUTION INTRAMUSCULAR; INTRAVENOUS; SUBCUTANEOUS at 13:41

## 2020-05-15 RX ADMIN — HYDROMORPHONE HYDROCHLORIDE 0.5 MG: 1 INJECTION, SOLUTION INTRAMUSCULAR; INTRAVENOUS; SUBCUTANEOUS at 14:07

## 2020-05-15 RX ADMIN — PROPOFOL 200 MG: 10 INJECTION, EMULSION INTRAVENOUS at 13:28

## 2020-05-15 RX ADMIN — SODIUM CHLORIDE, POTASSIUM CHLORIDE, SODIUM LACTATE AND CALCIUM CHLORIDE: 600; 310; 30; 20 INJECTION, SOLUTION INTRAVENOUS at 13:51

## 2020-05-15 RX ADMIN — GABAPENTIN 300 MG: 300 CAPSULE ORAL at 11:55

## 2020-05-15 RX ADMIN — GLYCOPYRROLATE 0.1 MG: 0.2 INJECTION, SOLUTION INTRAMUSCULAR; INTRAVENOUS at 13:28

## 2020-05-15 RX ADMIN — CEFAZOLIN SODIUM 2 G: 2 INJECTION, SOLUTION INTRAVENOUS at 13:24

## 2020-05-15 RX ADMIN — SUGAMMADEX 100 MG: 100 INJECTION, SOLUTION INTRAVENOUS at 14:25

## 2020-05-15 RX ADMIN — LIDOCAINE HYDROCHLORIDE 100 MG: 10 INJECTION, SOLUTION INFILTRATION; PERINEURAL at 13:28

## 2020-05-15 RX ADMIN — FENTANYL CITRATE 150 MCG: 50 INJECTION, SOLUTION INTRAMUSCULAR; INTRAVENOUS at 13:28

## 2020-05-15 RX ADMIN — MIDAZOLAM 2 MG: 1 INJECTION INTRAMUSCULAR; INTRAVENOUS at 13:24

## 2020-05-15 RX ADMIN — LIDOCAINE HYDROCHLORIDE 1 ML: 10 INJECTION, SOLUTION EPIDURAL; INFILTRATION; INTRACAUDAL; PERINEURAL at 12:19

## 2020-05-15 RX ADMIN — GLYCOPYRROLATE 0.1 MG: 0.2 INJECTION, SOLUTION INTRAMUSCULAR; INTRAVENOUS at 13:24

## 2020-05-15 RX ADMIN — ACETAMINOPHEN 975 MG: 325 TABLET, FILM COATED ORAL at 11:54

## 2020-05-15 RX ADMIN — KETOROLAC TROMETHAMINE 30 MG: 30 INJECTION, SOLUTION INTRAMUSCULAR at 14:08

## 2020-05-15 RX ADMIN — ONDANSETRON 4 MG: 2 INJECTION INTRAMUSCULAR; INTRAVENOUS at 14:07

## 2020-05-15 RX ADMIN — ROCURONIUM BROMIDE 35 MG: 10 INJECTION INTRAVENOUS at 13:28

## 2020-05-15 RX ADMIN — HYDROCODONE BITARTRATE AND ACETAMINOPHEN 2 TABLET: 5; 325 TABLET ORAL at 16:44

## 2020-05-15 RX ADMIN — SODIUM CHLORIDE, POTASSIUM CHLORIDE, SODIUM LACTATE AND CALCIUM CHLORIDE 1000 ML: 600; 310; 30; 20 INJECTION, SOLUTION INTRAVENOUS at 12:18

## 2020-05-15 RX ADMIN — HYDROMORPHONE HYDROCHLORIDE 0.5 MG: 1 INJECTION, SOLUTION INTRAMUSCULAR; INTRAVENOUS; SUBCUTANEOUS at 13:59

## 2020-05-15 ASSESSMENT — MIFFLIN-ST. JEOR: SCORE: 1414.01

## 2020-05-15 NOTE — ANESTHESIA CARE TRANSFER NOTE
Patient: Mora Maldonado    Procedure(s):  laparoscopic cholecystectomy    Diagnosis: Gallstones [K80.20]  Diagnosis Additional Information: No value filed.    Anesthesia Type:   General     Note:  Airway :Nasal Cannula  Patient transferred to:PACU  Handoff Report: Identifed the Patient, Identified the Reponsible Provider, Reviewed the pertinent medical history, Discussed the surgical course, Reviewed Intra-OP anesthesia mangement and issues during anesthesia, Set expectations for post-procedure period and Allowed opportunity for questions and acknowledgement of understanding      Vitals: (Last set prior to Anesthesia Care Transfer)    CRNA VITALS  5/15/2020 1359 - 5/15/2020 1436      5/15/2020             Pulse:  138    SpO2:  98 %    Resp Rate (observed):  10                Electronically Signed By: SHAWNEE Tapia CRNA  May 15, 2020  2:36 PM

## 2020-05-15 NOTE — ANESTHESIA POSTPROCEDURE EVALUATION
Patient: Mora Maldonado    Procedure(s):  laparoscopic cholecystectomy    Diagnosis:Gallstones [K80.20]  Diagnosis Additional Information: No value filed.    Anesthesia Type:  General    Note:  Anesthesia Post Evaluation    Patient location during evaluation: Bedside  Patient participation: Able to fully participate in evaluation  Level of consciousness: awake and alert  Pain management: adequate  Airway patency: patent  Cardiovascular status: acceptable  Respiratory status: acceptable  Hydration status: acceptable  PONV: none     Anesthetic complications: None          Last vitals:  Vitals:    05/15/20 1433 05/15/20 1445 05/15/20 1500   BP: 135/74 129/77 126/81   Pulse: 125 109 96   Resp: 12 14 13   Temp: 36.9  C (98.5  F)     SpO2: 99% 100% 100%         Electronically Signed By: Dimitris Heard CRNA, APRN CRNA  May 15, 2020  3:13 PM

## 2020-05-15 NOTE — OP NOTE
Preop diagnosis: Symptomatic cholelithiasis    Postop diagnosis: Same    Procedure: Laparoscopic cholecystectomy    Surgeon: Rocky    Anesthesia: General endotracheal Heard CRNA    Procedure: Patient's abdomen was cleaned and draped in a sterile manner.  1/4% Marcaine with epinephrine was used to anesthetize all port sites.  Small subumbilical curvilinear incision made and subcutaneous tissues dissected to fascia.  Fascia opened sharply and 12 mm blunt trocar inserted.  Carbon dioxide insufflated to 15 mmHg.  Under direct vision, subxiphoid 11 mm trocar placed as were 2 right-sided 5 mm trochars.  Attention was turned to the right upper quadrant.  Gallbladder was easily located grasped and elevated.  Small amount of fat surrounding the neck of the gallbladder was dissected free until the cystic duct was isolated.  It was encircled, clipped twice proximally once distally and ligated.  In a similar fashion the cystic artery was located clipped and ligated.  The gallbladder was then removed from liver bed using electrocautery, placed into an Endo Catch bag, and brought out through the subxiphoid port.  2 L of warm normal saline solution was used to irrigate out the abdominal cavity and this was sucked free until the effluent was clear.  Final inspection of the liver bed revealed to be intact with no evidence of hemorrhage or leakage.  Both cystic artery and duct stumps were intact clips applied.  Visualization of the remainder of the abdomen showed blood in the pelvis surrounding the uterus.  On further inspection it appeared to be a right-sided ovarian cyst which had ruptured.  Entire pelvis was irrigated with normal saline and there was no residual bleeding.  Finally all trochars were removed under direct vision and the air allowed to desufflate.  The fascial defect of the subumbilical port was closed using an 0 Vicryl suture in a figure-of-eight fashion and this was bolstered with a second 0 Vicryl on top of that  and reinjected with Marcaine.  All wounds were irrigated with normal saline and the skin closed using 4-0 Vicryl running subcuticular stitches and dressed with Dermabond.    Estimated blood loss: 5 mL    IV fluid: 1300 mL

## 2020-05-15 NOTE — INTERVAL H&P NOTE
YES I have reviewed the patient s H&P against current condition and have identified no clinically significant changes in the patient s condition.   NO I have identified significant changes in the patient s medical condition and have discussed with surgeon.     SHAWNEE Garcia CRNA

## 2020-05-15 NOTE — DISCHARGE INSTRUCTIONS
DISCHARGE INSTRUCTIONS     1. You may resume your regular diet when you feel you are ready    2. No heavy lifting (>30lbs)  for 1 month.    3. You will have some discomfort at the incision sites. This is expected. This should improve over the next 2-3 days. Ice and pain medication will help with this pain. Use prescribed pain medication as instructed.     4. Some bruising and mild swelling is normal after surgery. The area below and around the incision(s) may be hard and elevated. This is part of the normal healing process. This will resolve slowly over the next several months.     5. Your wounds are covered with glue. The glue is water tight and so you can shower or bathe immediately following surgery.     6. Use the following medications (in addition to your normal meds) as shown:      Tylenol (acetaminophen) 500 mg every 6 hours as needed for pain.    Do not take more than 1000 mg of Tylenol every 6 hours -OR- 4g in a day    Motrin (ibuprophen) 600 mg every 6 hours as needed for pain. Take with food.     8. Notify Clinic at [phone] if:     Your discomfort is not relieved by your pain medication     You have signs of infection such as temperature above 100.4 degrees orally,  chills, or increasing daily discomfort.     Incision site is becoming more red and/or there is purulent drainage.      9. Follow up with Dr Hidalgo in 1-2 weeks.    11. Most people take the rest of the week off and return to work the following Monday. You may return sooner as pain allows. During your follow-up appointment, the doctor will give you a formal letter for your work with any restrictions detailed.  All disability or other such paperwork will be addressed at that time.                        Same Day Surgery Discharge Instructions  Special Precautions After Surgery - Adult    1. It is not unusual to feel lightheaded or faint, up to 24 hours after surgery or while taking pain medication.  If you have these symptoms; sit for a few  minutes before standing and have someone assist you when getting up.  2. You should rest and relax for the next 24 hours and must have someone stay with you for at least 24 hours after your discharge.  3. DO NOT DRIVE any vehicle or operate mechanical equipment for 24 hours following the end of your surgery.  DO NOT DRIVE while taking narcotic pain medications that have been prescribed by your physician.  If you had a limb operated on, you must be able to use it fully to drive.  4. DO NOT drink alcoholic beverages for 24 hours following surgery or while taking prescription pain medication.  5. Drink clear liquids (apple juice, ginger ale, broth, 7-Up, etc.).  Progress to your regular diet as you feel able.  6. Any questions call your physician and do not make important decisions for 24 hours.    ACTIVITY  ? No lifting more than 30 pounds for 1 month.     INCISIONAL CARE  ? May bathe / shower after 24 hours.  ? Apply ice 1/2 hour on and 1/2 hour off while awake.  ? Be alert for signs of infection:  redness, swelling, heat, drainage of pus, and/or elevated temperature.  Contact your doctor if these occur.        Call for an appointment to return to the clinic in 1-2 weeks. (schedule a phone call visit)    Medications:  ? Hydrocodone (Norco, Vicodin):  Next dose: =.  ? Follow the instructions on the bottle.     Additional discharge instructions: you had blood in lower pelvis , suspected from a  Cyst noted on right ovary. MD cleaned this out in surgery but you may have a bit of lower pelvic pain due to this.  __________________________________________________________________________________________________________________________________  IMPORTANT NUMBERS:    [facility] Main Number:  [phone], [phone]  Pharmacy:  [phone]  Same Day Surgery:  [phone], Monday - Friday until 8:30 p.m.  Urgent Care:  [phone]  Emergency Room:  [phone]       Surgery Specialty Clinic:  [phone]

## 2020-05-16 ENCOUNTER — NURSE TRIAGE (OUTPATIENT)
Dept: NURSING | Facility: CLINIC | Age: 25
End: 2020-05-16

## 2020-05-16 ENCOUNTER — HOSPITAL ENCOUNTER (EMERGENCY)
Facility: CLINIC | Age: 25
Discharge: HOME | End: 2020-05-16
Attending: EMERGENCY MEDICINE | Admitting: EMERGENCY MEDICINE
Payer: COMMERCIAL

## 2020-05-16 VITALS
RESPIRATION RATE: 16 BRPM | OXYGEN SATURATION: 96 % | TEMPERATURE: 98 F | WEIGHT: 160 LBS | DIASTOLIC BLOOD PRESSURE: 89 MMHG | BODY MASS INDEX: 29.26 KG/M2 | HEART RATE: 87 BPM | SYSTOLIC BLOOD PRESSURE: 140 MMHG

## 2020-05-16 DIAGNOSIS — R33.9 URINARY RETENTION: ICD-10-CM

## 2020-05-16 LAB
ALBUMIN UR-MCNC: NEGATIVE MG/DL
APPEARANCE UR: CLEAR
BILIRUB UR QL STRIP: NEGATIVE
COLOR UR AUTO: YELLOW
GLUCOSE UR STRIP-MCNC: NEGATIVE MG/DL
HGB UR QL STRIP: NEGATIVE
KETONES UR STRIP-MCNC: NEGATIVE MG/DL
LEUKOCYTE ESTERASE UR QL STRIP: NEGATIVE
NITRATE UR QL: NEGATIVE
PH UR STRIP: 6 PH (ref 5–7)
SOURCE: NORMAL
SP GR UR STRIP: 1.01 (ref 1–1.03)
UROBILINOGEN UR STRIP-MCNC: 0 MG/DL (ref 0–2)

## 2020-05-16 PROCEDURE — 99284 EMERGENCY DEPT VISIT MOD MDM: CPT | Mod: Z6 | Performed by: EMERGENCY MEDICINE

## 2020-05-16 PROCEDURE — 81003 URINALYSIS AUTO W/O SCOPE: CPT | Performed by: EMERGENCY MEDICINE

## 2020-05-16 PROCEDURE — 51798 US URINE CAPACITY MEASURE: CPT | Performed by: EMERGENCY MEDICINE

## 2020-05-16 PROCEDURE — 99284 EMERGENCY DEPT VISIT MOD MDM: CPT | Performed by: EMERGENCY MEDICINE

## 2020-05-16 NOTE — ED PROVIDER NOTES
History     Chief Complaint   Patient presents with     Dysuria     jacqueline yesterday with R ovarian ooph, pt states she's unable to void, feeling bladder pain and pressure.      HPI  Mora Maldonado is a 25 year old female with a history of hypothyroidism, adjustment disorder, GERD who presents to the emergency department with difficulty urinating status post cholecystectomy.  Patient had laparoscopic cholecystectomy done by Peter iHdalgo yesterday and in the surgical note it is noted patient also was noted to have a ruptured ovarian cyst on the right which was irrigated.  Patient states things were going well but she has not been able to void since the surgery.  She denies any significant worsening abdominal pain she is not any nausea or vomiting.  She denies any dysuria.  She denies any back pain.  She has not had fevers or chills.  She denies chest pain shortness of breath she denies any focal numbness weakness in extremity.  She currently rates her pain a 2 out of 10.    Allergies:  Allergies   Allergen Reactions     Amoxicillin Hives     Penicillins Hives       Problem List:    Patient Active Problem List    Diagnosis Date Noted     Gallstones 05/12/2020     Priority: Medium     Added automatically from request for surgery [id number]       Adjustment disorder with mixed anxiety and depressed mood 01/20/2020     Priority: Medium     Hypothyroidism, unspecified type 09/09/2019     Priority: Medium     Papanicolaou smear of cervix with low grade squamous intraepithelial lesion (LGSIL) 09/03/2019     Priority: Medium     2014 NIL pap. (Found in Care Everywhere).  9/3/19 LSIL pap. Plan colp.   9/20/19 [place] Bx & ECC - Negative. Plan cotest in 1 year.             Past Medical History:    Past Medical History:   Diagnosis Date     Abnormal Pap smear of cervix 09/03/2019     Thyroid disease        Past Surgical History:    Past Surgical History:   Procedure Laterality Date     LAPAROSCOPIC CHOLECYSTECTOMY N/A 5/15/2020     Procedure: laparoscopic cholecystectomy;  Surgeon: Peter Hidalgo MD;  Location: [facility] OR       Family History:    Family History   Problem Relation Age of Onset     Rheumatoid Arthritis Mother      Thyroid Disease Maternal Grandmother        Social History:  Marital Status:  Single [1]  Social History     Tobacco Use     Smoking status: Never Smoker     Smokeless tobacco: Never Used   Substance Use Topics     Alcohol use: Never     Frequency: Monthly or less     Comment: once a month     Drug use: Never        Medications:    escitalopram (LEXAPRO) 20 MG tablet  etonogestrel (IMPLANON/NEXPLANON) 68 MG IMPL  HYDROcodone-acetaminophen (NORCO) 5-325 MG tablet  HYDROcodone-acetaminophen (NORCO) 5-325 MG tablet  levothyroxine (SYNTHROID/LEVOTHROID) 88 MCG tablet  omeprazole (PRILOSEC) 40 MG DR capsule          Review of Systems  All systems reviewed and other than pertinent positives and negatives in HPI all other systems are negative.  Physical Exam   BP: (!) 140/89  Pulse: 87  Temp: 98  F (36.7  C)  Resp: 16  Weight: 72.6 kg (160 lb)  SpO2: 96 %      Physical Exam  Vitals signs and nursing note reviewed.   Constitutional:       General: She is not in acute distress.     Appearance: Normal appearance. She is not ill-appearing, toxic-appearing or diaphoretic.   HENT:      Head: Normocephalic and atraumatic.      Nose: Nose normal. No congestion.      Mouth/Throat:      Mouth: Mucous membranes are moist.      Pharynx: Oropharynx is clear.   Eyes:      Conjunctiva/sclera: Conjunctivae normal.   Neck:      Musculoskeletal: Normal range of motion and neck supple.   Cardiovascular:      Rate and Rhythm: Normal rate and regular rhythm.      Pulses: Normal pulses.      Heart sounds: Normal heart sounds. No murmur.   Pulmonary:      Effort: Pulmonary effort is normal.      Breath sounds: No wheezing, rhonchi or rales.      Comments: Breath sounds slightly decreased at bases no rhonchi wheezes or rales are heard.  Abdominal:       Comments: Surgical incisions noted in the right upper lower quadrant and umbilical region.  Ecchymosis noted around umbilicus.  No significant erythema or tenderness around these regions.  There is mild fullness of the suprapubic area with mild tenderness to palpation.   Musculoskeletal: Normal range of motion.      Right lower leg: No edema.      Left lower leg: No edema.   Skin:     General: Skin is warm and dry.      Findings: No rash.   Neurological:      General: No focal deficit present.      Mental Status: She is alert.   Psychiatric:         Mood and Affect: Mood normal.         Behavior: Behavior normal.         ED Course        Procedures               Critical Care time:  none               Results for orders placed or performed during the hospital encounter of 05/16/20 (from the past 24 hour(s))   UA reflex to Microscopic   Result Value Ref Range    Color Urine Yellow     Appearance Urine Clear     Glucose Urine Negative NEG^Negative mg/dL    Bilirubin Urine Negative NEG^Negative    Ketones Urine Negative NEG^Negative mg/dL    Specific Gravity Urine 1.011 1.003 - 1.035    Blood Urine Negative NEG^Negative    pH Urine 6.0 5.0 - 7.0 pH    Protein Albumin Urine Negative NEG^Negative mg/dL    Urobilinogen mg/dL 0.0 0.0 - 2.0 mg/dL    Nitrite Urine Negative NEG^Negative    Leukocyte Esterase Urine Negative NEG^Negative    Source Catheterized Urine        Medications - No data to display    Assessments & Plan (with Medical Decision Making) records were reviewed.  Bladder scan was obtained and revealed over 800 mL of urine therefore a Mims catheter was placed and drained about 880 mL of urine.  Patient was feeling better.  UA was sent and was unremarkable.  On reexamination patient has no tenderness palpation the abdomen and her incision sites look fine.  I did not think labs were warranted at this time and further imaging studies would not be needed.  I discussed the risks and benefits of leaving a Mims  catheter in place for the weekend versus pulling the Mims catheter and seeing if she can get again urinate.  Patient understands that by pulling the Mims catheter she may have to return if she is unable to urinate.  Patient understands this and would like the Mims pulled.  This was done.  She will return if symptoms worsen or new symptoms develop and follow-up with her primary care next available.     I have reviewed the nursing notes.    I have reviewed the findings, diagnosis, plan and need for follow up with the patient.       New Prescriptions    No medications on file       Final diagnoses:   Urinary retention - post op       5/16/2020   [facility] EMERGENCY DEPARTMENT     Margarito Garcia MD  05/17/20 0607

## 2020-05-16 NOTE — TELEPHONE ENCOUNTER
She had surgery yesterday. She will have someone bring her to the ER now. She is unable to void and feels like she has a full bladder.  Jamila Reyes RN  [facility]      Reason for Disposition    [1] Can't pass urine or can only pass a few drops AND [2] bladder feels very full (e.g., strong urge to urinate)    Additional Information    Negative: Shock suspected (very weak, limp, not moving, too weak to stand, pale cool skin)    Negative: Sounds like a life-threatening emergency to the triager    Negative: [1]  AND [2] concerns about urination frequency AND [3] bottle-feeding    Negative: [1] [place] AND [2] concerns about urination frequency AND [3] breast-feeding    Negative: Decreased urination is caused by decreased fluid intake    Negative: Changes in color or odor of urine is main concern    Negative: Blood in the urine is main concern    Negative: Wetting (enuresis) is main concern    Negative: [1] Discomfort (pain, burning or stinging) when passing urine AND [2] female    Negative: [1] Discomfort (pain, burning or stinging) when passing urine AND [2] male    Negative: Taking antibiotic for urinary tract infection (UTI)    Negative: Followed an injury to the female genital area    Negative: Followed an injury to the penis    Negative: Pain in scrotum is main symptom    Protocols used: URINATION - ALL OTHER SYMPTOMS-P-AH

## 2020-05-16 NOTE — ED NOTES
Patient had jacqueline yesterday. Did not void prior to leaving after surgery. States she has been drinking fluids and still unable to void.

## 2020-05-16 NOTE — ED AVS SNAPSHOT
[facility] Emergency Department  [address] [address]  Phone:  [phone]  Fax:  [phone]                                    Mora Maldonado   MRN: [id number]    Department:  [facility] Emergency Department   Date of Visit:  5/16/2020           After Visit Summary Signature Page    I have received my discharge instructions, and my questions have been answered. I have discussed any challenges I see with this plan with the nurse or doctor.    ..........................................................................................................................................  Patient/Patient Representative Signature      ..........................................................................................................................................  Patient Representative Print Name and Relationship to Patient    ..................................................               ................................................  Date                                   Time    ..........................................................................................................................................  Reviewed by Signature/Title    ...................................................              ..............................................  Date                                               Time          22EPIC Rev 08/18

## 2020-05-16 NOTE — DISCHARGE INSTRUCTIONS
Return if symptoms worsen or new symptoms develop.  Follow-up with primary care physician next available.  Drink plenty of fluids.  If any further urinary retention please return for further evaluation and care.  Monitor any increased abdominal pain redness on abdominal wall vomiting, fever or other symptoms.  We discussed leaving the Mims in but it was decided at this time to take it out and see if he could urinate on her own.  If you have further urinary tension and have increased pressure in your lower abdomen you need to return for recheck.

## 2020-05-18 DIAGNOSIS — R11.0 POSTOPERATIVE NAUSEA: Primary | ICD-10-CM

## 2020-05-18 DIAGNOSIS — Z98.890 POSTOPERATIVE NAUSEA: Primary | ICD-10-CM

## 2020-05-18 RX ORDER — ONDANSETRON 4 MG/1
4-8 TABLET, FILM COATED ORAL EVERY 8 HOURS PRN
Qty: 20 TABLET | Refills: 0 | Status: SHIPPED | OUTPATIENT
Start: 2020-05-18 | End: 2020-08-08

## 2020-05-19 ENCOUNTER — NURSE TRIAGE (OUTPATIENT)
Dept: NURSING | Facility: CLINIC | Age: 25
End: 2020-05-19

## 2020-05-19 ENCOUNTER — TELEPHONE (OUTPATIENT)
Dept: SURGERY | Facility: CLINIC | Age: 25
End: 2020-05-19

## 2020-05-19 ENCOUNTER — APPOINTMENT (OUTPATIENT)
Dept: CT IMAGING | Facility: CLINIC | Age: 25
End: 2020-05-19
Attending: EMERGENCY MEDICINE
Payer: COMMERCIAL

## 2020-05-19 ENCOUNTER — HOSPITAL ENCOUNTER (EMERGENCY)
Facility: CLINIC | Age: 25
Discharge: HOME | End: 2020-05-20
Attending: EMERGENCY MEDICINE | Admitting: EMERGENCY MEDICINE
Payer: COMMERCIAL

## 2020-05-19 VITALS
HEIGHT: 62 IN | TEMPERATURE: 98 F | SYSTOLIC BLOOD PRESSURE: 133 MMHG | BODY MASS INDEX: 29.44 KG/M2 | HEART RATE: 81 BPM | DIASTOLIC BLOOD PRESSURE: 85 MMHG | WEIGHT: 160 LBS | OXYGEN SATURATION: 98 % | RESPIRATION RATE: 16 BRPM

## 2020-05-19 DIAGNOSIS — R33.8 POSTOPERATIVE URINARY RETENTION: ICD-10-CM

## 2020-05-19 DIAGNOSIS — R07.9 CHEST PAIN, UNSPECIFIED TYPE: ICD-10-CM

## 2020-05-19 DIAGNOSIS — R06.02 SOB (SHORTNESS OF BREATH): ICD-10-CM

## 2020-05-19 DIAGNOSIS — N99.89 POSTOPERATIVE URINARY RETENTION: ICD-10-CM

## 2020-05-19 LAB
ALBUMIN SERPL-MCNC: 3.7 G/DL (ref 3.4–5)
ALP SERPL-CCNC: 83 U/L (ref 40–150)
ALT SERPL W P-5'-P-CCNC: 31 U/L (ref 0–50)
ANION GAP SERPL CALCULATED.3IONS-SCNC: 10 MMOL/L (ref 3–14)
AST SERPL W P-5'-P-CCNC: 19 U/L (ref 0–45)
BASOPHILS # BLD AUTO: 0.1 10E9/L (ref 0–0.2)
BASOPHILS NFR BLD AUTO: 0.5 %
BILIRUB SERPL-MCNC: 0.3 MG/DL (ref 0.2–1.3)
BUN SERPL-MCNC: 15 MG/DL (ref 7–30)
CALCIUM SERPL-MCNC: 8.8 MG/DL (ref 8.5–10.1)
CHLORIDE SERPL-SCNC: 105 MMOL/L (ref 94–109)
CO2 SERPL-SCNC: 25 MMOL/L (ref 20–32)
COPATH REPORT: NORMAL
CREAT SERPL-MCNC: 0.69 MG/DL (ref 0.52–1.04)
DIFFERENTIAL METHOD BLD: NORMAL
EOSINOPHIL # BLD AUTO: 0.3 10E9/L (ref 0–0.7)
EOSINOPHIL NFR BLD AUTO: 2.8 %
ERYTHROCYTE [DISTWIDTH] IN BLOOD BY AUTOMATED COUNT: 11.8 % (ref 10–15)
GFR SERPL CREATININE-BSD FRML MDRD: >90 ML/MIN/{1.73_M2}
GLUCOSE SERPL-MCNC: 81 MG/DL (ref 70–99)
HCT VFR BLD AUTO: 39.1 % (ref 35–47)
HGB BLD-MCNC: 13.5 G/DL (ref 11.7–15.7)
IMM GRANULOCYTES # BLD: 0 10E9/L (ref 0–0.4)
IMM GRANULOCYTES NFR BLD: 0.3 %
LIPASE SERPL-CCNC: 120 U/L (ref 73–393)
LYMPHOCYTES # BLD AUTO: 3 10E9/L (ref 0.8–5.3)
LYMPHOCYTES NFR BLD AUTO: 30.4 %
MCH RBC QN AUTO: 29.7 PG (ref 26.5–33)
MCHC RBC AUTO-ENTMCNC: 34.5 G/DL (ref 31.5–36.5)
MCV RBC AUTO: 86 FL (ref 78–100)
MONOCYTES # BLD AUTO: 0.6 10E9/L (ref 0–1.3)
MONOCYTES NFR BLD AUTO: 5.9 %
NEUTROPHILS # BLD AUTO: 5.9 10E9/L (ref 1.6–8.3)
NEUTROPHILS NFR BLD AUTO: 60.1 %
NRBC # BLD AUTO: 0 10*3/UL
NRBC BLD AUTO-RTO: 0 /100
PLATELET # BLD AUTO: 222 10E9/L (ref 150–450)
POTASSIUM SERPL-SCNC: 3.9 MMOL/L (ref 3.4–5.3)
PROT SERPL-MCNC: 7.6 G/DL (ref 6.8–8.8)
RBC # BLD AUTO: 4.54 10E12/L (ref 3.8–5.2)
SODIUM SERPL-SCNC: 140 MMOL/L (ref 133–144)
TROPONIN I SERPL-MCNC: <0.015 UG/L (ref 0–0.04)
WBC # BLD AUTO: 9.8 10E9/L (ref 4–11)

## 2020-05-19 PROCEDURE — 83690 ASSAY OF LIPASE: CPT | Performed by: EMERGENCY MEDICINE

## 2020-05-19 PROCEDURE — 25000125 ZZHC RX 250: Performed by: EMERGENCY MEDICINE

## 2020-05-19 PROCEDURE — 99285 EMERGENCY DEPT VISIT HI MDM: CPT | Mod: 25 | Performed by: EMERGENCY MEDICINE

## 2020-05-19 PROCEDURE — 25000128 H RX IP 250 OP 636: Performed by: EMERGENCY MEDICINE

## 2020-05-19 PROCEDURE — 80053 COMPREHEN METABOLIC PANEL: CPT | Performed by: EMERGENCY MEDICINE

## 2020-05-19 PROCEDURE — 85025 COMPLETE CBC W/AUTO DIFF WBC: CPT | Performed by: EMERGENCY MEDICINE

## 2020-05-19 PROCEDURE — 96374 THER/PROPH/DIAG INJ IV PUSH: CPT | Mod: 59

## 2020-05-19 PROCEDURE — 99285 EMERGENCY DEPT VISIT HI MDM: CPT | Mod: 25

## 2020-05-19 PROCEDURE — 93010 ELECTROCARDIOGRAM REPORT: CPT | Mod: Z6 | Performed by: EMERGENCY MEDICINE

## 2020-05-19 PROCEDURE — 51798 US URINE CAPACITY MEASURE: CPT

## 2020-05-19 PROCEDURE — 96361 HYDRATE IV INFUSION ADD-ON: CPT

## 2020-05-19 PROCEDURE — 25800030 ZZH RX IP 258 OP 636: Performed by: EMERGENCY MEDICINE

## 2020-05-19 PROCEDURE — 84484 ASSAY OF TROPONIN QUANT: CPT | Performed by: EMERGENCY MEDICINE

## 2020-05-19 PROCEDURE — 93005 ELECTROCARDIOGRAM TRACING: CPT

## 2020-05-19 PROCEDURE — 71275 CT ANGIOGRAPHY CHEST: CPT

## 2020-05-19 RX ORDER — IOPAMIDOL 755 MG/ML
67 INJECTION, SOLUTION INTRAVASCULAR ONCE
Status: COMPLETED | OUTPATIENT
Start: 2020-05-19 | End: 2020-05-19

## 2020-05-19 RX ORDER — KETOROLAC TROMETHAMINE 30 MG/ML
15 INJECTION, SOLUTION INTRAMUSCULAR; INTRAVENOUS ONCE
Status: COMPLETED | OUTPATIENT
Start: 2020-05-19 | End: 2020-05-19

## 2020-05-19 RX ORDER — SODIUM CHLORIDE 9 MG/ML
1000 INJECTION, SOLUTION INTRAVENOUS CONTINUOUS
Status: DISCONTINUED | OUTPATIENT
Start: 2020-05-19 | End: 2020-05-20 | Stop reason: HOSPADM

## 2020-05-19 RX ADMIN — SODIUM CHLORIDE 1000 ML: 9 INJECTION, SOLUTION INTRAVENOUS at 21:21

## 2020-05-19 RX ADMIN — KETOROLAC TROMETHAMINE 15 MG: 30 INJECTION, SOLUTION INTRAMUSCULAR at 22:34

## 2020-05-19 RX ADMIN — IOPAMIDOL 67 ML: 755 INJECTION, SOLUTION INTRAVENOUS at 21:29

## 2020-05-19 RX ADMIN — SODIUM CHLORIDE 98 ML: 9 INJECTION, SOLUTION INTRAVENOUS at 21:30

## 2020-05-19 ASSESSMENT — MIFFLIN-ST. JEOR: SCORE: 1414.01

## 2020-05-19 ASSESSMENT — ENCOUNTER SYMPTOMS
SHORTNESS OF BREATH: 1
FEVER: 0
ABDOMINAL PAIN: 0

## 2020-05-19 NOTE — ED AVS SNAPSHOT
[facility] Emergency Department  [address] [address]  Phone:  [phone]  Fax:  [phone]                                    Mora Maldonado   MRN: [id number]    Department:  [facility] Emergency Department   Date of Visit:  5/19/2020           After Visit Summary Signature Page    I have received my discharge instructions, and my questions have been answered. I have discussed any challenges I see with this plan with the nurse or doctor.    ..........................................................................................................................................  Patient/Patient Representative Signature      ..........................................................................................................................................  Patient Representative Print Name and Relationship to Patient    ..................................................               ................................................  Date                                   Time    ..........................................................................................................................................  Reviewed by Signature/Title    ...................................................              ..............................................  Date                                               Time          22EPIC Rev 08/18

## 2020-05-19 NOTE — TELEPHONE ENCOUNTER
"\"I had my gallbladder out on Friday 5/15(see epic) and today I feel like I'm having a harder time breathing. I also have some mid back pain. The breathing is like if you do a belly flop in a pool, it kind of takes your breath or hard to take a deep breath in.\" denies fever , chest pain or other sx. Patient is speaking in complete sentences during triage. Paged on call Dr. Skaggs ([facility] [facility]) through page op at 7:00 pm to call FNA. Paged second time at 7:22 pm (Page op had taken down an incorrect call back number) Verified again with page op to call [phone].No response, called patient back at 7:35 (called times 3) finally left a generic message on VM to go to ER.  Maren Solano RN [facility]    COVID 19 Nurse Triage Plan/Patient Instructions    Please be aware that novel coronavirus (COVID-19) may be circulating in the community. If you develop symptoms such as fever, cough, or SOB or if you have concerns about the presence of another infection including coronavirus (COVID-19), please contact your health care provider or visit [email or web address].     Disposition/Instructions    Patient to go to ED and follow protocol based instructions. Follow System Ambulatory Workflow for COVID 19.     Bring Your Own Device:  Please also bring your smart device(s) (smart phones, tablets, laptops) and their charging cables for your personal use and to communicate with your care team during your visit.    Thank you for limiting contact with others, wearing a simple mask to cover your cough, practice good hand hygiene habits and accessing our virtual services where possible to limit the spread of this virus.    For more information about COVID19 and options for caring for yourself at home, please visit the CDC website at https://www.cdc.gov/coronavirus/2019-ncov/about/steps-when-sick.html  For more options for care at [facility], please visit our website at [email or web address]  "   For more information, please use the [place] Department of Health COVID-19 Website: https://www.health.[facility].[place].us/diseases/coronavirus/index.html  [place] Department of Health ([facility]) COVID-19 Hotlines (Interpreters available):      Health questions: Phone Number: [phone] or [phone] and Hours: 7 a.m. to 7 p.m.    Schools and  questions: Phone Number: [phone] or [phone] and Hours 7 a.m. to 7 p.m.                  Reason for Disposition    [1] Caller has URGENT question AND [2] triager unable to answer question    Protocols used: POST-OP SYMPTOMS AND [id number]

## 2020-05-20 NOTE — TELEPHONE ENCOUNTER
Attempted to call patient at 1910 after [place] nursing line page and again at 1912.  No response to either call.  Will attempt again later.  Attempted home number at 1914.  Left message and will attempt to return call later.

## 2020-05-20 NOTE — ED PROVIDER NOTES
Emergency Department Patient Sign-out       Brief HPI:  This is a 25 year old female signed out to me by Dr. TREVER Mayo at 9.50pm.  Plan at sign out and hand-off is disposition depending on ED course awaiting CT chest PE protocol and troponin. See Dr Mayo's ED  note for details of the presentation, ED course, work-up, clinical impression and plan of care prior to hand-off. Briefly by report at handoff patient is 4 days status post laparoscopic cholecystectomy who presented with chest pressure and shortness of breath.       Significant Events prior to my assuming care:  I spoke with the patient via ipad at 10.30pm. Reviewed Dr. Mayo's ED note.  Patient reported  urinary retention postop to nursing staff. Patient bladder scan 550ml.  Bladder decompression by quick catheterization was offered.  CT chest PE protocol was negative.  See additional details in interpreting radiology report below.  Troponin is also negative.  With patient reporting poor urine output since surgery I suspect her chest discomfort and shortness of breath could be related to increased intra-abdominal pressure with urinary retention.  Patient did have postop urinary retention and was evaluated in the department on May 16, 2020 when she was noted to have 880 mL's of urine.  Patient did void during her ED course tonight 200 mL's but still had 350 mL's and after discussion about risk and benefits she agreed to a repeat quick catheterization for bladder decompression. Patient cathed for 800ml.  Urine was reported to be clear in color without cloudiness or sediment see nursing note for additional details. Patient had a negative urinalysis on May 15, 2020 I did not feel repeat urinalysis was medically necessary.   Patient was reassured by her CT chest PE protocol being negative and a negative troponin.  My suspicion that she has chest tightness and shortness of breath as a presenting symptom of COVID-19  is low hence no testing was initiated.  We  "discussed that if her symptoms persist she may need to return to be reevaluated and further diagnostic testing and work-up may be necessary.  Patient expressed comfort and understanding.    Diagnosis 1) Chest pain- unclear cause                    2) Shortness of breath- unclear cause                    3) Post-op laproscopic cholecystectomy on May 15, 2020                    4) Post-op urinary retention (recurring, ED visit on May 16)      Exam:   Patient Vitals for the past 24 hrs:   BP Temp Temp src Pulse Heart Rate Resp SpO2 Height Weight   05/19/20 2230 -- -- -- -- 78 (!) 35 94 % -- --   05/19/20 2215 (!) 158/97 -- -- 73 74 10 100 % -- --   05/19/20 2200 (!) 173/104 -- -- 75 73 17 100 % -- --   05/19/20 2145 (!) 137/93 -- -- -- -- -- -- -- --   05/19/20 1956 129/83 98  F (36.7  C) Oral -- 87 16 100 % 1.575 m (5' 2\") 72.6 kg (160 lb)           ED RESULTS:   Results for orders placed or performed during the hospital encounter of 05/19/20 (from the past 24 hour(s))   CBC with platelets differential     Status: None    Collection Time: 05/19/20  9:18 PM   Result Value Ref Range    WBC 9.8 4.0 - 11.0 10e9/L    RBC Count 4.54 3.8 - 5.2 10e12/L    Hemoglobin 13.5 11.7 - 15.7 g/dL    Hematocrit 39.1 35.0 - 47.0 %    MCV 86 78 - 100 fl    MCH 29.7 26.5 - 33.0 pg    MCHC 34.5 31.5 - 36.5 g/dL    RDW 11.8 10.0 - 15.0 %    Platelet Count 222 150 - 450 10e9/L    Diff Method Automated Method     % Neutrophils 60.1 %    % Lymphocytes 30.4 %    % Monocytes 5.9 %    % Eosinophils 2.8 %    % Basophils 0.5 %    % Immature Granulocytes 0.3 %    Nucleated RBCs 0 0 /100    Absolute Neutrophil 5.9 1.6 - 8.3 10e9/L    Absolute Lymphocytes 3.0 0.8 - 5.3 10e9/L    Absolute Monocytes 0.6 0.0 - 1.3 10e9/L    Absolute Eosinophils 0.3 0.0 - 0.7 10e9/L    Absolute Basophils 0.1 0.0 - 0.2 10e9/L    Abs Immature Granulocytes 0.0 0 - 0.4 10e9/L    Absolute Nucleated RBC 0.0    Comprehensive metabolic panel     Status: None    Collection Time: " 05/19/20  9:18 PM   Result Value Ref Range    Sodium 140 133 - 144 mmol/L    Potassium 3.9 3.4 - 5.3 mmol/L    Chloride 105 94 - 109 mmol/L    Carbon Dioxide 25 20 - 32 mmol/L    Anion Gap 10 3 - 14 mmol/L    Glucose 81 70 - 99 mg/dL    Urea Nitrogen 15 7 - 30 mg/dL    Creatinine 0.69 0.52 - 1.04 mg/dL    GFR Estimate >90 >60 mL/min/[1.73_m2]    GFR Estimate If Black >90 >60 mL/min/[1.73_m2]    Calcium 8.8 8.5 - 10.1 mg/dL    Bilirubin Total 0.3 0.2 - 1.3 mg/dL    Albumin 3.7 3.4 - 5.0 g/dL    Protein Total 7.6 6.8 - 8.8 g/dL    Alkaline Phosphatase 83 40 - 150 U/L    ALT 31 0 - 50 U/L    AST 19 0 - 45 U/L   Lipase     Status: None    Collection Time: 05/19/20  9:18 PM   Result Value Ref Range    Lipase 120 73 - 393 U/L   Troponin I     Status: None    Collection Time: 05/19/20  9:18 PM   Result Value Ref Range    Troponin I ES <0.015 0.000 - 0.045 ug/L   CT Chest Pulmonary Embolism w Contrast     Status: None    Collection Time: 05/19/20  9:50 PM    Narrative    EXAM: CT CHEST PULMONARY EMBOLISM W CONTRAST  LOCATION: [facility]  DATE/TIME: 5/19/2020 9:28 PM    INDICATION: PE suspected, high pretest prob  COMPARISON: None.  TECHNIQUE: CT angiogram chest during arterial phase injection IV contrast. 2D and 3D MIP reconstructions were performed by the CT technologist. Dose reduction techniques were used.   CONTRAST: 67 mL Isovue-370    . FINDINGS:  ANGIOGRAM CHEST: No evidence for pulmonary embolism. Pulmonary arteries normal in caliber. Thoracic aorta normal in caliber. No aortic dissection or other acute abnormality.    HEART: Cardiac chambers within normal limits. No pericardial effusion.    LUNGS AND PLEURA: No pulmonary mass, consolidation, or suspicious pulmonary nodule. No pleural effusion or pneumothorax.    MEDIASTINUM: No adenopathy or mass.    LIMITED UPPER ABDOMEN: Negative.    MUSCULOSKELETAL: Negative.      Impression    IMPRESSION:  1.  No evidence for pulmonary embolism or other acute  abnormality.       ED MEDICATIONS:   Medications   0.9% sodium chloride BOLUS (0 mLs Intravenous Stopped 5/19/20 2233)     Followed by   sodium chloride 0.9% infusion (has no administration in time range)   iopamidol (ISOVUE-370) solution 67 mL (67 mLs Intravenous Given 5/19/20 2129)   sodium chloride 0.9 % bag 500mL for CT scan flush use (98 mLs As instructed Given 5/19/20 2130)   ketorolac (TORADOL) injection 15 mg (15 mg Intravenous Given 5/19/20 2234)       Impression:    ICD-10-CM    1. Chest pain, unspecified type  R07.9 CBC with platelets differential     Comprehensive metabolic panel     Lipase     Troponin I   2. SOB (shortness of breath)  R06.02    3. Postoperative urinary retention  N99.89     R33.8     persistent. ED eval on May 15, 2020       Plan:    Discharged home with symptoms of unclear cause as reported and close outpatient follow-up with primary surgeon and primary care provide. Return to the emergency department if worsening or  progressive symptoms.      MD Meghan Smith Ebenezer Tope, MD  05/19/20 9505

## 2020-05-20 NOTE — TELEPHONE ENCOUNTER
"Returned call. She c/o pain in mid abdomen resulting in shortness of breath.  She feels her heart rate is up.  \"the wind is knocked out of her.\"  No family history of blood clotting disorders.  She admits diminished appetite since surgery. She did have urinary retention following surgery and required straight catheterization in the ED.  She feels this is different.  Has been voiding without issue.  No fevers or chills.    Advised patient given shortness of breath and perceived elevated heart rate best to be assessed in the ED.  Patient agreeable and proceeding to ED.    Hitesh Skaggs, DO on 5/19/2020 at 7:25 PM    "

## 2020-05-20 NOTE — DISCHARGE INSTRUCTIONS
1) The cause of your symptoms as reported since surgery is not clear.  Your evaluation tonight is reassuring with a negative chest CT showing no pneumonia no blood clots no evidence for heart attack.    2) Although the cause of your symptoms not clear if you have continued symptoms I recommend you follow-up with your surgeon and your clinic doctor within the next 3 days    3) you appear to continue to be retaining urine since surgery.  We have discussed postop urinary retention and the need to monitor your urine output.  Your bladder was successfully decompressed tonight (800ml) similar to May 16.    4) If you develop a fever or new symptoms of concern you should return to the department to be reevaluated.

## 2020-05-20 NOTE — ED NOTES
Pt had Laparoscopic cholecystectomy on 5-15-20 onset of chest pain and SOA this am, lungs are clear pain is mid sternal MD at bedside

## 2020-05-20 NOTE — ED PROVIDER NOTES
History     Chief Complaint   Patient presents with     Post-op Problem     Gallbladder surgery on Friday, today she is feeling short of breath and chest pain     HPI  Mora Maldonado is a 25 year old female who has past medical history significant for laparoscopic cholecystectomy that was performed 4 days ago, on May 15, 2020.  Laparoscopic cholecystectomy was performed.  I reviewed nursing notes.  Patient had presented to the emergency department the following day for urinary retention.  It was noted in the operative note that patient also had ruptured ovarian cyst on the right which had been irrigated.  Patient had difficulty voiding after the surgery, and ultimately had straight catheterization performed, and Mims catheter had been removed prior to discharge home from the hospital.  Patient presents now with concerns regarding chest pain, in addition to shortness of breath.  She states that she awoke this morning with midsternal chest pain, pressure-like in nature, worsened with exertion, with feelings of shortness of breath.  No personal, or family history of bleeding, or clotting disorders.  No recent traveling.  No lower extremity swelling.  Did have mild discomfort especially in the lower abdomen, however this has somewhat resolved.  Mild right upper quadrant pain as well.  Patient has not had other surgeries previously.  No fever has been noted.  I also reviewed telephone encounters and patient did have discussion with general surgeon on-call instructing her to come to the emergency department.    Allergies:  Allergies   Allergen Reactions     Amoxicillin Hives     Penicillins Hives       Problem List:    Patient Active Problem List    Diagnosis Date Noted     Gallstones 05/12/2020     Priority: Medium     Added automatically from request for surgery [id number]       Adjustment disorder with mixed anxiety and depressed mood 01/20/2020     Priority: Medium     Hypothyroidism, unspecified type 09/09/2019     " Priority: Medium     Papanicolaou smear of cervix with low grade squamous intraepithelial lesion (LGSIL) 09/03/2019     Priority: Medium     2014 NIL pap. (Found in Care Everywhere).  9/3/19 LSIL pap. Plan colp.   9/20/19 [facility] - Negative. Plan cotest in 1 year.             Past Medical History:    Past Medical History:   Diagnosis Date     Abnormal Pap smear of cervix 09/03/2019     Thyroid disease        Past Surgical History:    Past Surgical History:   Procedure Laterality Date     LAPAROSCOPIC CHOLECYSTECTOMY N/A 5/15/2020    Procedure: laparoscopic cholecystectomy;  Surgeon: Peter Hidalgo MD;  Location: [facility] OR       Family History:    Family History   Problem Relation Age of Onset     Rheumatoid Arthritis Mother      Thyroid Disease Maternal Grandmother        Social History:  Marital Status:  Single [1]  Social History     Tobacco Use     Smoking status: Never Smoker     Smokeless tobacco: Never Used   Substance Use Topics     Alcohol use: Never     Frequency: Monthly or less     Comment: once a month     Drug use: Never        Medications:    escitalopram (LEXAPRO) 20 MG tablet  etonogestrel (IMPLANON/NEXPLANON) 68 MG IMPL  HYDROcodone-acetaminophen (NORCO) 5-325 MG tablet  HYDROcodone-acetaminophen (NORCO) 5-325 MG tablet  levothyroxine (SYNTHROID/LEVOTHROID) 88 MCG tablet  omeprazole (PRILOSEC) 40 MG DR capsule  ondansetron (ZOFRAN) 4 MG tablet          Review of Systems   Constitutional: Negative for fever.   Respiratory: Positive for shortness of breath.    Cardiovascular: Positive for chest pain.   Gastrointestinal: Negative for abdominal pain.   All other systems reviewed and are negative.      Physical Exam   BP: 129/83  Heart Rate: 87  Temp: 98  F (36.7  C)  Resp: 16  Height: 157.5 cm (5' 2\")  Weight: 72.6 kg (160 lb)  SpO2: 100 %      Physical Exam  /83   Temp 98  F (36.7  C) (Oral)   Resp 16   Ht 1.575 m (5' 2\")   Wt 72.6 kg (160 lb)   SpO2 100%   BMI 29.26 kg/m    General: " alert, interactive, in no apparent distress  Head: atraumatic  Nose: no rhinorrhea or epistaxis  Ears: no external auditory canal discharge or bleeding.    Eyes: Sclera nonicteric. Conjunctiva noninjected. PERRL, EOMI  Mouth: no tonsillar erythema, edema, or exudate  Neck: supple, no palp LAD  Lungs: CTAB  CV: RRR, S1/S2; peripheral pulses palpable and symmetric  Abdomen: soft, slight right upper quadrant tenderness to palpation, in addition to suprapubic tenderness.  Incisions are clean, dry, and intact.  Bruising is noted around incision sites  Extremities: no cyanosis or edema  Skin: no rash or diaphoresis  Neuro:   strength 5/5 in UE and LEs bilaterally, sensation intact to light touch in UE and LEs bilaterally;       ED Course        Procedures          EKG, reviewed by myself shows sinus rhythm.  Rate 86 bpm.  All intervals.  No acute ischemic appearing changes.    Critical Care time:  none               Results for orders placed or performed during the hospital encounter of 05/19/20 (from the past 24 hour(s))   CBC with platelets differential   Result Value Ref Range    WBC 9.8 4.0 - 11.0 10e9/L    RBC Count 4.54 3.8 - 5.2 10e12/L    Hemoglobin 13.5 11.7 - 15.7 g/dL    Hematocrit 39.1 35.0 - 47.0 %    MCV 86 78 - 100 fl    MCH 29.7 26.5 - 33.0 pg    MCHC 34.5 31.5 - 36.5 g/dL    RDW 11.8 10.0 - 15.0 %    Platelet Count 222 150 - 450 10e9/L    Diff Method Automated Method     % Neutrophils 60.1 %    % Lymphocytes 30.4 %    % Monocytes 5.9 %    % Eosinophils 2.8 %    % Basophils 0.5 %    % Immature Granulocytes 0.3 %    Nucleated RBCs 0 0 /100    Absolute Neutrophil 5.9 1.6 - 8.3 10e9/L    Absolute Lymphocytes 3.0 0.8 - 5.3 10e9/L    Absolute Monocytes 0.6 0.0 - 1.3 10e9/L    Absolute Eosinophils 0.3 0.0 - 0.7 10e9/L    Absolute Basophils 0.1 0.0 - 0.2 10e9/L    Abs Immature Granulocytes 0.0 0 - 0.4 10e9/L    Absolute Nucleated RBC 0.0    Comprehensive metabolic panel   Result Value Ref Range    Sodium 140 133  - 144 mmol/L    Potassium 3.9 3.4 - 5.3 mmol/L    Chloride 105 94 - 109 mmol/L    Carbon Dioxide PENDING 20 - 32 mmol/L    Anion Gap PENDING 3 - 14 mmol/L    Glucose PENDING 70 - 99 mg/dL    Urea Nitrogen PENDING 7 - 30 mg/dL    Creatinine PENDING 0.52 - 1.04 mg/dL    GFR Estimate PENDING >60 mL/min/[1.73_m2]    GFR Estimate If Black PENDING >60 mL/min/[1.73_m2]    Calcium PENDING 8.5 - 10.1 mg/dL    Bilirubin Total PENDING 0.2 - 1.3 mg/dL    Albumin PENDING 3.4 - 5.0 g/dL    Protein Total PENDING 6.8 - 8.8 g/dL    Alkaline Phosphatase PENDING 40 - 150 U/L    ALT PENDING 0 - 50 U/L    AST PENDING 0 - 45 U/L   Lipase   Result Value Ref Range    Lipase 120 73 - 393 U/L       Medications   0.9% sodium chloride BOLUS (1,000 mLs Intravenous New Bag 5/19/20 2121)     Followed by   sodium chloride 0.9% infusion (has no administration in time range)   iopamidol (ISOVUE-370) solution 67 mL (67 mLs Intravenous Given 5/19/20 2129)   sodium chloride 0.9 % bag 500mL for CT scan flush use (98 mLs As instructed Given 5/19/20 2130)       Assessments & Plan (with Medical Decision Making)  25 year old female, with past medical history significant for recent laparoscopic cholecystectomy performed 4 days ago, presenting to the emergency department with concerns regarding chest pressure, in addition to shortness of breath.  Symptoms began this morning.  Patient with no severe chest pains currently, however did have some exertional pain, and some shortness of breath.  Concern is for possible pulmonary embolism in the postoperative time.  Additionally, and pneumonia remains on the differential as does pneumothorax.  Given the high clinical risk, with recent surgical procedure, CT scan of the chest will be performed to rule out blood clot.    Laboratory work-up is unremarkable thus far.  CT scan pending.  Will sign out pending CT scan.  Awaiting troponin as well.  Will sign out to oncoming provider with disposition pending CT scan.          I have reviewed the nursing notes.    I have reviewed the findings, diagnosis, plan and need for follow up with the patient.       New Prescriptions    No medications on file       Final diagnoses:   Chest pain, unspecified type   SOB (shortness of breath)       5/19/2020   [facility] EMERGENCY DEPARTMENT     Margarito Mayo MD  05/19/20 [id number]

## 2020-06-04 ENCOUNTER — MYC MEDICAL ADVICE (OUTPATIENT)
Dept: SURGERY | Facility: CLINIC | Age: 25
End: 2020-06-04

## 2020-06-04 DIAGNOSIS — F43.23 ADJUSTMENT DISORDER WITH MIXED ANXIETY AND DEPRESSED MOOD: ICD-10-CM

## 2020-06-04 RX ORDER — ESCITALOPRAM OXALATE 20 MG/1
20 TABLET ORAL DAILY
Qty: 30 TABLET | Refills: 1 | Status: CANCELLED | OUTPATIENT
Start: 2020-06-04

## 2020-06-04 NOTE — TELEPHONE ENCOUNTER
"Requested Prescriptions   Pending Prescriptions Disp Refills     escitalopram (LEXAPRO) 20 MG tablet 30 tablet 1     Sig: Take 1 tablet (20 mg) by mouth daily   Last Written Prescription Date:  05/13/20  Last Fill Quantity: 30,  # refills: 1   Last office visit: 04/14/20 with prescribing provider:  TOYIN Garcia   Future Office Visit:              SSRIs Protocol Failed - 6/4/2020 12:12 PM   MOHINDER-7 SCORE 11/12/2019 1/20/2020 4/10/2020   Total Score 12 21 15       PHQ 11/12/2019 1/20/2020 4/10/2020   PHQ-9 Total Score 19 18 9   Q9: Thoughts of better off dead/self-harm past 2 weeks Several days More than half the days Several days        Failed - PHQ-9 score less than 5 in past 6 months     Please review last PHQ-9 score.           Passed - Medication is active on med list        Passed - Patient is age 18 or older        Passed - No active pregnancy on record        Passed - No positive pregnancy test in last 12 months        Passed - Recent (6 mo) or future (30 days) visit within the authorizing provider's specialty     Patient had office visit in the last 6 months or has a visit in the next 30 days with authorizing provider or within the authorizing provider's specialty.  See \"Patient Info\" tab in inbasket, or \"Choose Columns\" in Meds & Orders section of the refill encounter.                 "

## 2020-06-05 ENCOUNTER — MYC MEDICAL ADVICE (OUTPATIENT)
Dept: SURGERY | Facility: CLINIC | Age: 25
End: 2020-06-05

## 2020-06-10 ENCOUNTER — OFFICE VISIT (OUTPATIENT)
Dept: SURGERY | Facility: CLINIC | Age: 25
End: 2020-06-10
Payer: COMMERCIAL

## 2020-06-10 VITALS
WEIGHT: 160.05 LBS | HEART RATE: 83 BPM | BODY MASS INDEX: 29.45 KG/M2 | TEMPERATURE: 98.3 F | DIASTOLIC BLOOD PRESSURE: 79 MMHG | HEIGHT: 62 IN | SYSTOLIC BLOOD PRESSURE: 123 MMHG

## 2020-06-10 DIAGNOSIS — Z09 POSTOP CHECK: Primary | ICD-10-CM

## 2020-06-10 PROCEDURE — 99024 POSTOP FOLLOW-UP VISIT: CPT | Performed by: SURGERY

## 2020-06-10 ASSESSMENT — MIFFLIN-ST. JEOR: SCORE: 1414.25

## 2020-06-10 NOTE — LETTER
"    6/10/2020         RE: Mora Maldonado  [address] [address] [facility] [address]        Dear Colleague,    Thank you for referring your patient, Mora Maldonado, to the [facility]. Please see a copy of my visit note below.    No complaints.  Pain controlled with oral pain meds.    /79 (BP Location: Right arm, Patient Position: Sitting, Cuff Size: Adult Regular)   Pulse 83   Temp 98.3  F (36.8  C) (Tympanic)   Ht 1.575 m (5' 2\")   Wt 72.6 kg (160 lb 0.9 oz)   BMI 29.27 kg/m      Exam  [id number]  CTAB  RRR  S&NTND+BS, wounds - cdi s erythema  No CCE    A/P s/p lap jacqueline healing well.  No heavy lifting for 1 week.  RTC prn.    Peter Hidalgo MD     Again, thank you for allowing me to participate in the care of your patient.        Sincerely,        Peter Hidalgo MD    "

## 2020-06-10 NOTE — NURSING NOTE
"Initial /79 (BP Location: Right arm, Patient Position: Sitting, Cuff Size: Adult Regular)   Pulse 83   Temp 98.3  F (36.8  C) (Tympanic)   Ht 1.575 m (5' 2\")   Wt 72.6 kg (160 lb 0.9 oz)   BMI 29.27 kg/m   Estimated body mass index is 29.27 kg/m  as calculated from the following:    Height as of this encounter: 1.575 m (5' 2\").    Weight as of this encounter: 72.6 kg (160 lb 0.9 oz). .    Sari Gaxiola MA    "

## 2020-06-10 NOTE — PROGRESS NOTES
"No complaints.  Pain controlled with oral pain meds.    /79 (BP Location: Right arm, Patient Position: Sitting, Cuff Size: Adult Regular)   Pulse 83   Temp 98.3  F (36.8  C) (Tympanic)   Ht 1.575 m (5' 2\")   Wt 72.6 kg (160 lb 0.9 oz)   BMI 29.27 kg/m      Exam  [id number]  CTAB  RRR  S&NTND+BS, wounds - cdi s erythema  No CCE    A/P s/p lap jacqueline healing well.  No heavy lifting for 1 week.  RTC prn.    Peter Hidalgo MD   "

## 2020-06-28 DIAGNOSIS — E03.9 HYPOTHYROIDISM, UNSPECIFIED TYPE: ICD-10-CM

## 2020-06-28 DIAGNOSIS — K21.9 GASTROESOPHAGEAL REFLUX DISEASE, ESOPHAGITIS PRESENCE NOT SPECIFIED: ICD-10-CM

## 2020-06-30 ENCOUNTER — MYC MEDICAL ADVICE (OUTPATIENT)
Dept: SURGERY | Facility: CLINIC | Age: 25
End: 2020-06-30

## 2020-06-30 RX ORDER — OMEPRAZOLE 40 MG/1
CAPSULE, DELAYED RELEASE ORAL
Qty: 90 CAPSULE | Refills: 0 | Status: SHIPPED | OUTPATIENT
Start: 2020-06-30 | End: 2020-09-24

## 2020-06-30 NOTE — TELEPHONE ENCOUNTER
TSH   Date Value Ref Range Status   05/13/2020 6.56 (H) 0.40 - 4.00 mU/L Final     To provider to please advise    Prescription for Omeprazole was sent to the pharmacy.  Sujatha Fuchs RN

## 2020-07-01 NOTE — TELEPHONE ENCOUNTER
"Routing refill request to provider for review/approval because:  Failed protocol.  Please advise. Thank you. Miriam Ceja R.N.    Requested Prescriptions   Pending Prescriptions Disp Refills    levothyroxine (SYNTHROID/LEVOTHROID) 88 MCG tablet [Pharmacy Med Name: LEVOTHYROXINE 88 MCG TABLET] 94 tablet 0     Sig: TAKE 1 TABLET (88 MCG) BY MOUTH DAILY MON-SAT, AND TAKE 2 TABLETS BY MOUTH ON SUNDAYS       Thyroid Protocol Failed - 7/1/2020  1:07 PM        Failed - Normal TSH on file in past 12 months     Recent Labs   Lab Test 05/13/20  1722   TSH 6.56*              Passed - Patient is 12 years or older        Passed - Recent (12 mo) or future (30 days) visit within the authorizing provider's specialty     Patient has had an office visit with the authorizing provider or a provider within the authorizing providers department within the previous 12 mos or has a future within next 30 days. See \"Patient Info\" tab in inbasket, or \"Choose Columns\" in Meds & Orders section of the refill encounter.              Passed - Medication is active on med list        Passed - No active pregnancy on record     If patient is pregnant or has had a positive pregnancy test, please check TSH.          Passed - No positive pregnancy test in past 12 months     If patient is pregnant or has had a positive pregnancy test, please check TSH.           Signed Prescriptions Disp Refills    omeprazole (PRILOSEC) 40 MG DR capsule 90 capsule 0     Sig: TAKE 1 CAPSULE BY MOUTH EVERY DAY       PPI Protocol Passed - 6/28/2020 11:26 AM        Passed - Not on Clopidogrel (unless Pantoprazole ordered)        Passed - No diagnosis of osteoporosis on record        Passed - Recent (12 mo) or future (30 days) visit within the authorizing provider's specialty     Patient has had an office visit with the authorizing provider or a provider within the authorizing providers department within the previous 12 mos or has a future within next 30 days. See \"Patient " "Info\" tab in inbasket, or \"Choose Columns\" in Meds & Orders section of the refill encounter.              Passed - Medication is active on med list        Passed - Patient is age 18 or older        Passed - No active pregnacy on record        Passed - No positive pregnancy test in past 12 months                   "

## 2020-07-02 RX ORDER — LEVOTHYROXINE SODIUM 88 UG/1
TABLET ORAL
Qty: 94 TABLET | Refills: 0 | Status: SHIPPED | OUTPATIENT
Start: 2020-07-02 | End: 2020-08-08 | Stop reason: DRUGHIGH

## 2020-07-02 NOTE — TELEPHONE ENCOUNTER
I spoke to [facility] pharmacy @[phone] and they will send this refill request to Dr. Robert at [place] in Endocrinology per Dr. Garcia's note below.  Cancel this refill request for Dr. Garcia.      Can an RN please cancel the pended medication and then close this encounter?  TC is done.  Thank you.  Sarah Eason,

## 2020-07-03 ENCOUNTER — OFFICE VISIT (OUTPATIENT)
Dept: SURGERY | Facility: CLINIC | Age: 25
End: 2020-07-03
Payer: COMMERCIAL

## 2020-07-03 VITALS
TEMPERATURE: 98.3 F | RESPIRATION RATE: 16 BRPM | HEART RATE: 83 BPM | BODY MASS INDEX: 31.64 KG/M2 | WEIGHT: 173 LBS | SYSTOLIC BLOOD PRESSURE: 114 MMHG | DIASTOLIC BLOOD PRESSURE: 73 MMHG

## 2020-07-03 DIAGNOSIS — Z09 POSTOP CHECK: ICD-10-CM

## 2020-07-03 DIAGNOSIS — E03.9 HYPOTHYROIDISM, UNSPECIFIED TYPE: Primary | ICD-10-CM

## 2020-07-03 PROCEDURE — 99024 POSTOP FOLLOW-UP VISIT: CPT | Performed by: SURGERY

## 2020-07-03 RX ORDER — LEVOTHYROXINE SODIUM 88 UG/1
88 TABLET ORAL DAILY
Qty: 90 TABLET | Refills: 1 | Status: SHIPPED | OUTPATIENT
Start: 2020-07-03 | End: 2020-08-08 | Stop reason: DRUGHIGH

## 2020-07-03 NOTE — NURSING NOTE
"Initial /73 (BP Location: Right arm, Patient Position: Sitting, Cuff Size: Adult Large)   Pulse 83   Temp 98.3  F (36.8  C) (Tympanic)   Resp 16   Wt 78.5 kg (173 lb)   BMI 31.64 kg/m   Estimated body mass index is 31.64 kg/m  as calculated from the following:    Height as of 6/10/20: 1.575 m (5' 2\").    Weight as of this encounter: 78.5 kg (173 lb). .    Renée Bey, [facility]      "

## 2020-07-03 NOTE — LETTER
7/3/2020         RE: Mora Maldonado  [address] [address] [facility] [address]        Dear Colleague,    Thank you for referring your patient, Mora Maldonado, to the [facility]. Please see a copy of my visit note below.    No complaints.  Pain minimal    /73 (BP Location: Right arm, Patient Position: Sitting, Cuff Size: Adult Large)   Pulse 83   Temp 98.3  F (36.8  C) (Tympanic)   Resp 16   Wt 78.5 kg (173 lb)   BMI 31.64 kg/m      Exam  [id number]  CTAB  RRR  S&NTND+BS, wounds - cdi s erythema  No CCE    A/P s/p lap jacqueline healing well.   RTC prn.    Peter Hidalgo MD     Again, thank you for allowing me to participate in the care of your patient.        Sincerely,        Peter Hidalgo MD

## 2020-07-03 NOTE — PROGRESS NOTES
No complaints.  Pain minimal    /73 (BP Location: Right arm, Patient Position: Sitting, Cuff Size: Adult Large)   Pulse 83   Temp 98.3  F (36.8  C) (Tympanic)   Resp 16   Wt 78.5 kg (173 lb)   BMI 31.64 kg/m      Exam  [id number]  CTAB  RRR  S&NTND+BS, wounds - cdi s erythema  No CCE    A/P s/p lap jacqueline healing well.   RTC prn.    Peter Hidalgo MD

## 2020-07-31 ENCOUNTER — NURSE TRIAGE (OUTPATIENT)
Dept: NURSING | Facility: CLINIC | Age: 25
End: 2020-07-31

## 2020-07-31 ENCOUNTER — OFFICE VISIT (OUTPATIENT)
Dept: URGENT CARE | Facility: URGENT CARE | Age: 25
End: 2020-07-31
Payer: COMMERCIAL

## 2020-07-31 VITALS
DIASTOLIC BLOOD PRESSURE: 80 MMHG | OXYGEN SATURATION: 98 % | SYSTOLIC BLOOD PRESSURE: 125 MMHG | TEMPERATURE: 97.6 F | HEART RATE: 76 BPM

## 2020-07-31 DIAGNOSIS — E03.9 HYPOTHYROIDISM, UNSPECIFIED TYPE: Primary | ICD-10-CM

## 2020-07-31 DIAGNOSIS — R10.84 ABDOMINAL PAIN, GENERALIZED: ICD-10-CM

## 2020-07-31 PROCEDURE — 84439 ASSAY OF FREE THYROXINE: CPT | Performed by: FAMILY MEDICINE

## 2020-07-31 PROCEDURE — 99213 OFFICE O/P EST LOW 20 MIN: CPT | Performed by: FAMILY MEDICINE

## 2020-07-31 PROCEDURE — 36415 COLL VENOUS BLD VENIPUNCTURE: CPT | Performed by: FAMILY MEDICINE

## 2020-07-31 PROCEDURE — 84443 ASSAY THYROID STIM HORMONE: CPT | Performed by: FAMILY MEDICINE

## 2020-08-01 ENCOUNTER — MYC MEDICAL ADVICE (OUTPATIENT)
Dept: ENDOCRINOLOGY | Facility: CLINIC | Age: 25
End: 2020-08-01

## 2020-08-01 LAB
T4 FREE SERPL-MCNC: 1.06 NG/DL (ref 0.76–1.46)
TSH SERPL DL<=0.005 MIU/L-ACNC: 6.41 MU/L (ref 0.4–4)

## 2020-08-01 NOTE — TELEPHONE ENCOUNTER
"Patient reports she had surgery a few months ago for the gallbladder. Reports she went 2 months without pain. Reports now she is having off and on pain in the incision area. Reports it looks puffy. Feels like one side is more swollen than the other side. Rates the pain mild now, but states on her way home driving from work she had sharp pain which felt like someone was taking a needle and jabbing her with it.     RN was about to pull up the triage protocol and patient states she wants someone to look at her incision and states she is planning on going to [place] urgent care. RN was unable to fully triage symptoms. Patient states she is \"on her way\" to [place] urgent care now.     Deborah Galvan RN/CIRO [facility]      Additional Information    Negative: [1] Caller is not with the adult (patient) AND [2] reporting urgent symptoms    Negative: Lab result questions    Negative: Medication questions    Negative: Caller can't be reached by phone    Negative: Caller has already spoken to PCP or another triager    Negative: RN needs further essential information from caller in order to complete triage    Negative: Requesting regular office appointment    Negative: [1] Caller requesting NON-URGENT health information AND [2] PCP's office is the best resource    Health Information question, no triage required and triager able to answer question    Protocols used: INFORMATION ONLY CALL-A-AH      "

## 2020-08-01 NOTE — PROGRESS NOTES
Chief complaint: abdominal pain    Patient had gallbladder surgery in May   Post-op patient had minimal pain  However since then would get episodes of pain in the incision spots  Off and on     Denies any possibility of pregnancy declined a pregnancy test    Patient has consulted with surgeon 4 weeks ago and was told everything was ok however patient is still having symptoms     aggravating symptoms: working out, certain movement or if heavy meal   relieving symptoms: none  nausea and vomiting: none    diarrhea: looser stools since gallbladder surgery   treatments tried: none   urinary symptoms:  none   flank pain:  none  fever or chills:  none  weight loss or constitutional symptoms: none  melena, hematochezia, or hematemesis: none    Patient also mentions that she gained 30 lbs since surgery and feels her abdominal area is bloated     Problem list, Medication list, Allergies, and Medical/Social/Surgical histories reviewed in [facility] and updated as appropriate.      ROS:  General: negative for fever  Resp: negative for chest pain   CV: negative for chest pain  ABD: as above  : negative for dysuria  Neurologic:negative for Headache  Psych: denies any thoughts of harming self or others.     Constitutional, HEENT, cardiovascular, pulmonary, GI, , musculoskeletal, neuro, skin, endocrine and psych systems are negative, except as otherwise noted.    OBJECTIVE:  /80   Pulse 76   Temp 97.6  F (36.4  C) (Tympanic)   SpO2 98%    General:   awake, alert, and cooperative.  NAD.   Head: Normocephalic, atraumatic.  Eyes: Conjunctiva clear, non icteric. BLAIRE  Heart: Regular rate and rhythm. No murmur.  Lungs: Chest is clear; no wheezes or rales.  ABD: soft, no tenderness to palpation , no rigidity, guarding or rebound , bowel sounds intact  RECTAL: declined/deferred  Skin: no rashes  Incision sites look well healed. No erythema no warmth no swelling no tenderness  Psych: pleasant no thoughts of harming self or others    Neuro: Alert and oriented - normal speech.       Diagnostic Test Results:  none   ASSESSMENT:well appearing  \    ICD-10-CM    1. Hypothyroidism, unspecified type  E03.9 TSH with free T4 reflex   2. Abdominal pain, generalized  R10.84          PLAN:   inicision looks good no clinical evidence of infection.  Possibly still post-op pain - however I advised patient to contact her surgeon to discuss as this is not my specialty.   She is non-toxic. Symptoms are mild and sporadic. Sharp pains with movement 3-4 times a week.  Alarm signs or symptoms discussed, if present recommend go to ER   Patient also mentions some weight gain- which she states was also common post-op  Her last tsh in may was slightly off- I will recheck today  Recommend follow up with her primary care provider and endocrinologist in regards to weight gain concerns      Advised about symptoms which might herald more serious problems.    advised to come back in right away if with any worsening symptoms or if with no relief despite treatment plan  close follow-up recommended.  patient voiced understanding and had no further questions at this time.        Rani Blankenship MD

## 2020-08-05 ENCOUNTER — VIRTUAL VISIT (OUTPATIENT)
Dept: ENDOCRINOLOGY | Facility: CLINIC | Age: 25
End: 2020-08-05
Payer: COMMERCIAL

## 2020-08-05 DIAGNOSIS — E03.9 HYPOTHYROIDISM, UNSPECIFIED TYPE: Primary | ICD-10-CM

## 2020-08-05 PROCEDURE — 99213 OFFICE O/P EST LOW 20 MIN: CPT | Mod: GT | Performed by: INTERNAL MEDICINE

## 2020-08-05 RX ORDER — LEVOTHYROXINE SODIUM 125 UG/1
125 TABLET ORAL DAILY
Qty: 30 TABLET | Refills: 11 | Status: SHIPPED | OUTPATIENT
Start: 2020-08-05 | End: 2020-09-14

## 2020-08-05 NOTE — LETTER
"    8/5/2020         RE: Mora Maldonado  [address] [facility] [address]        Dear Colleague,    Thank you for referring your patient, Mora Maldonado, to the [facility]. Please see a copy of my visit note below.    Mora Maldonado is a 25 year old female who is being evaluated via a billable video visit.      The patient has been notified of following:     \"This video visit will be conducted via a call between you and your physician/provider. We have found that certain health care needs can be provided without the need for an in-person physical exam.  This service lets us provide the care you need with a video conversation.  If a prescription is necessary we can send it directly to your pharmacy.  If lab work is needed we can place an order for that and you can then stop by our lab to have the test done at a later time.    Video visits are billed at different rates depending on your insurance coverage.  Please reach out to your insurance provider with any questions.    If during the course of the call the physician/provider feels a video visit is not appropriate, you will not be charged for this service.\"    Patient has given verbal consent for Video visit? Yes  How would you like to obtain your AVS? MyChart  If you are dropped from the video visit, the video invite should be resent to: Other e-mail: My chart  Will anyone else be joining your video visit? No        Video-Visit Details    Type of service:  Video Visit    Video Start Time: 9:01 AM  Video End Time: 9:20 AM    Originating Location (pt. Location): Home    Distant Location (provider location):  [facility]     Platform used for Video Visit: Mark GRAMAJO:   Patient here for evaluation of hypothyroidism.   She went to see her primary care in the summer of 2019 because of trouble losing weight, fatigue, and hair loss.   She was started on 25 mcg levothyroxine daily.   Then increased to 50 and then 100 mcg.   After " labs in 11/2019 showed low TSH, her dose was dropped to 88 mcg daily.      She has also been started on lexapro.   She has had Nexplanon for birth control for several years.      Hair has become thicker but is still dry.      She takes the levothyroxine in the AM with her lexapro, 30 minutes before eating.   Denies missing any doses.      Sometimes with drink Herbalife.      She was taking biotin but comments she stopped it before her 11/2019 labs.   No iodine or kelp.      Sleeping 10 hours a night and not refreshed in the AM. Tossing and turning. No snoring.      She was having constipation 1 month ago which improved with having more fiber in her diet.      In 4/2020, she has been having abdominal pain, nausea and emesis.   She has doubled her PPI dose and better the last two days.   Her hair is growing thicker and faster.      She is concerned she is gaining weight.   The last two weeks she has been tracking her meals through weight watchers. 26 points a day.   She has been on this for 4 months but tracking more attentively recently.   Walks her dog daily.   Does note less active since COVID.      Sleep is improved. Feeling rested and not having to nap.      He lexapro was increased a few days ago to help with anxiety.     In 8/2020, she gained weight after gall bladder removed in 5/2020.   Sticking to 20-23 points on Weight Watcher every day.   She is having some abdominal pain after eating. Particularly if eating fatty foods.   Activity declined after surgery and is now starting to build back up.     Her hair continues to improve.      ROS: 10 point review of systems negative aside from detailed.     Exam:  GENERAL: Healthy, alert and no distress  EYES: Eyes grossly normal to inspection.  No discharge or erythema, or obvious scleral/conjunctival abnormalities.  RESP: No audible wheeze, cough, or visible cyanosis.  No visible retractions or increased work of breathing.    SKIN: Visible skin clear. No significant  rash, abnormal pigmentation or lesions.  NEURO: Cranial nerves grossly intact.  Mentation and speech appropriate for age.  PSYCH: Mentation appears normal, affect normal/bright, judgement and insight intact, normal speech and appearance well-groomed.      A/P:   Hypothyroidism - Extensive discussion of thyroid hormone and normal physiology. Included was discussion of thyroid in  relation to weight and energy. She has had trouble getting a consistent TSH level. She has been compliant with taking medication. While she is taking her levothyroxine with lexapro which can its absorption, this has been a consistent pattern. Based on weight, her full replacement dose would be 114 mcg daily. She was taking biotin close to her 11/2019 labs and during her 10/2019 which could have caused a falsely low reading.   In 1/2020, recommend taking two tablets of 88 mcg on Sundays and 1 tablet the other 6 days of the week.   In 4/2020, labs normal aside from slightly low free T3. Discussed how highly variable this reading is.   In 8/2020, slightly hypothyroid and struggling with post op abdominal pain/bloating after gall bladder removal.   -Increase levothyroxine to 125 mcg daily.   -Increase exercise as able.   -Labs in 1 month.      Stephan Robert MD on 8/5/2020 at 9:20 AM        Again, thank you for allowing me to participate in the care of your patient.        Sincerely,        Stephan Robert MD

## 2020-08-05 NOTE — PROGRESS NOTES
"Mora Maldonado is a 25 year old female who is being evaluated via a billable video visit.      The patient has been notified of following:     \"This video visit will be conducted via a call between you and your physician/provider. We have found that certain health care needs can be provided without the need for an in-person physical exam.  This service lets us provide the care you need with a video conversation.  If a prescription is necessary we can send it directly to your pharmacy.  If lab work is needed we can place an order for that and you can then stop by our lab to have the test done at a later time.    Video visits are billed at different rates depending on your insurance coverage.  Please reach out to your insurance provider with any questions.    If during the course of the call the physician/provider feels a video visit is not appropriate, you will not be charged for this service.\"    Patient has given verbal consent for Video visit? Yes  How would you like to obtain your AVS? MyChart  If you are dropped from the video visit, the video invite should be resent to: Other e-mail: My chart  Will anyone else be joining your video visit? No        Video-Visit Details    Type of service:  Video Visit    Video Start Time: 9:01 AM  Video End Time: 9:20 AM    Originating Location (pt. Location): Home    Distant Location (provider location):  [facility]     Platform used for Video Visit: Mark    S:   Patient here for evaluation of hypothyroidism.   She went to see her primary care in the summer of 2019 because of trouble losing weight, fatigue, and hair loss.   She was started on 25 mcg levothyroxine daily.   Then increased to 50 and then 100 mcg.   After labs in 11/2019 showed low TSH, her dose was dropped to 88 mcg daily.      She has also been started on lexapro.   She has had Nexplanon for birth control for several years.      Hair has become thicker but is still dry.      She takes the " levothyroxine in the AM with her lexapro, 30 minutes before eating.   Denies missing any doses.      Sometimes with drink Herbalife.      She was taking biotin but comments she stopped it before her 11/2019 labs.   No iodine or kelp.      Sleeping 10 hours a night and not refreshed in the AM. Tossing and turning. No snoring.      She was having constipation 1 month ago which improved with having more fiber in her diet.      In 4/2020, she has been having abdominal pain, nausea and emesis.   She has doubled her PPI dose and better the last two days.   Her hair is growing thicker and faster.      She is concerned she is gaining weight.   The last two weeks she has been tracking her meals through weight watchers. 26 points a day.   She has been on this for 4 months but tracking more attentively recently.   Walks her dog daily.   Does note less active since COVID.      Sleep is improved. Feeling rested and not having to nap.      He lexapro was increased a few days ago to help with anxiety.     In 8/2020, she gained weight after gall bladder removed in 5/2020.   Sticking to 20-23 points on Weight Watcher every day.   She is having some abdominal pain after eating. Particularly if eating fatty foods.   Activity declined after surgery and is now starting to build back up.     Her hair continues to improve.      ROS: 10 point review of systems negative aside from detailed.     Exam:  GENERAL: Healthy, alert and no distress  EYES: Eyes grossly normal to inspection.  No discharge or erythema, or obvious scleral/conjunctival abnormalities.  RESP: No audible wheeze, cough, or visible cyanosis.  No visible retractions or increased work of breathing.    SKIN: Visible skin clear. No significant rash, abnormal pigmentation or lesions.  NEURO: Cranial nerves grossly intact.  Mentation and speech appropriate for age.  PSYCH: Mentation appears normal, affect normal/bright, judgement and insight intact, normal speech and appearance  well-groomed.      A/P:   Hypothyroidism - Extensive discussion of thyroid hormone and normal physiology. Included was discussion of thyroid in  relation to weight and energy. She has had trouble getting a consistent TSH level. She has been compliant with taking medication. While she is taking her levothyroxine with lexapro which can its absorption, this has been a consistent pattern. Based on weight, her full replacement dose would be 114 mcg daily. She was taking biotin close to her 11/2019 labs and during her 10/2019 which could have caused a falsely low reading.   In 1/2020, recommend taking two tablets of 88 mcg on Sundays and 1 tablet the other 6 days of the week.   In 4/2020, labs normal aside from slightly low free T3. Discussed how highly variable this reading is.   In 8/2020, slightly hypothyroid and struggling with post op abdominal pain/bloating after gall bladder removal.   -Increase levothyroxine to 125 mcg daily.   -Increase exercise as able.   -Labs in 1 month.      Stephan Robert MD on 8/5/2020 at 9:20 AM

## 2020-08-08 ENCOUNTER — OFFICE VISIT (OUTPATIENT)
Dept: URGENT CARE | Facility: URGENT CARE | Age: 25
End: 2020-08-08
Payer: COMMERCIAL

## 2020-08-08 VITALS
WEIGHT: 178.2 LBS | HEIGHT: 62 IN | HEART RATE: 75 BPM | SYSTOLIC BLOOD PRESSURE: 126 MMHG | TEMPERATURE: 98.6 F | OXYGEN SATURATION: 96 % | DIASTOLIC BLOOD PRESSURE: 79 MMHG | BODY MASS INDEX: 32.79 KG/M2

## 2020-08-08 DIAGNOSIS — G89.18 POSTOPERATIVE PAIN: ICD-10-CM

## 2020-08-08 DIAGNOSIS — R10.10 UPPER ABDOMINAL PAIN: ICD-10-CM

## 2020-08-08 DIAGNOSIS — R07.0 THROAT PAIN: Primary | ICD-10-CM

## 2020-08-08 DIAGNOSIS — R21 RASH: ICD-10-CM

## 2020-08-08 LAB
DEPRECATED S PYO AG THROAT QL EIA: NEGATIVE
SPECIMEN SOURCE: NORMAL
SPECIMEN SOURCE: NORMAL
STREP GROUP A PCR: NOT DETECTED

## 2020-08-08 PROCEDURE — 40001204 ZZHCL STATISTIC STREP A RAPID: Performed by: FAMILY MEDICINE

## 2020-08-08 PROCEDURE — 99214 OFFICE O/P EST MOD 30 MIN: CPT | Performed by: FAMILY MEDICINE

## 2020-08-08 PROCEDURE — 87651 STREP A DNA AMP PROBE: CPT | Performed by: FAMILY MEDICINE

## 2020-08-08 PROCEDURE — U0003 INFECTIOUS AGENT DETECTION BY NUCLEIC ACID (DNA OR RNA); SEVERE ACUTE RESPIRATORY SYNDROME CORONAVIRUS 2 (SARS-COV-2) (CORONAVIRUS DISEASE [COVID-19]), AMPLIFIED PROBE TECHNIQUE, MAKING USE OF HIGH THROUGHPUT TECHNOLOGIES AS DESCRIBED BY CMS-2020-01-R: HCPCS | Performed by: FAMILY MEDICINE

## 2020-08-08 RX ORDER — CEPHALEXIN 500 MG/1
500 CAPSULE ORAL 2 TIMES DAILY
Qty: 28 CAPSULE | Refills: 0 | Status: SHIPPED | OUTPATIENT
Start: 2020-08-08 | End: 2020-08-22

## 2020-08-08 ASSESSMENT — MIFFLIN-ST. JEOR: SCORE: 1496.56

## 2020-08-08 NOTE — PROGRESS NOTES
"  CHIEF COMPLAINT    ST for 1 day.    Upper abd pain post lap jacqueline    Rash      HISTORY    She has ST and some nasal congestion. No fever. No malaise.    Also c/o upper abd cramping abd pain, intermittent. Worse after fatty foods. No specific treatment yet.    Also notes a rash. Present X 2 weeks.      Patient Active Problem List   Diagnosis     Papanicolaou smear of cervix with low grade squamous intraepithelial lesion (LGSIL)     Hypothyroidism, unspecified type     Adjustment disorder with mixed anxiety and depressed mood     Gallstones       Current Outpatient Medications   Medication Sig Dispense Refill     cephALEXin (KEFLEX) 500 MG capsule Take 1 capsule (500 mg) by mouth 2 times daily for 14 days 28 capsule 0     escitalopram (LEXAPRO) 20 MG tablet TAKE 1 TABLET BY MOUTH EVERY DAY 90 tablet 0     etonogestrel (IMPLANON/NEXPLANON) 68 MG IMPL Inject 68 mg Subcutaneous       levothyroxine (SYNTHROID/LEVOTHROID) 125 MCG tablet Take 1 tablet (125 mcg) by mouth daily 30 tablet 11     omeprazole (PRILOSEC) 40 MG DR capsule TAKE 1 CAPSULE BY MOUTH EVERY DAY 90 capsule 0       REVIEW OF SYSTEMS    Weight gain 20 + pounds in 7 months.  No SOB  No CP  No vomiting  No urinary difficulty      Past Medical History:   Diagnosis Date     Abnormal Pap smear of cervix 09/03/2019    See problem list     Thyroid disease          EXAM  /79   Pulse 75   Temp 98.6  F (37  C) (Tympanic)   Ht 1.575 m (5' 2\")   Wt 80.8 kg (178 lb 3.2 oz)   SpO2 96%   BMI 32.59 kg/m      Sclera non icteric  Pharynx mild redness  Neck no adenopathy  Chest cl  Abd non distended, no focal tenderness  Skin follicular rash trunk and extremities.      Results for orders placed or performed in visit on 08/08/20   Streptococcus A Rapid Scr w Reflx to PCR     Status: None    Specimen: Throat   Result Value Ref Range    Strep Specimen Description Throat     Streptococcus Group A Rapid Screen Negative NEG^Negative           (R07.0) Throat pain  " (primary encounter diagnosis)  Comment:   Plan: Streptococcus A Rapid Scr w Reflx to PCR, Group        A Streptococcus PCR Throat Swab, Symptomatic         COVID-19 Virus (Coronavirus) by PCR        Symptomatic    (G89.18) Postoperative pain  Comment:   Plan:     (R10.10) Upper abdominal pain  Comment:   Post jacqueline.   Plan:   Alternagel    (R21) Rash  Comment:   folliculitis  Plan: cephALEXin (KEFLEX) 500 MG capsule        See instructions.      She is on weight watchers.  Exercise.  Consider discuss with ehr endocrinologist.

## 2020-08-08 NOTE — PATIENT INSTRUCTIONS
Take prescribed medication as directed.    Consider washing with Hibiclens Soap 2-3 times per weeks.    Try Alternagel Antacid 3 times a day.

## 2020-08-08 NOTE — LETTER
August 8, 2020          Mora Maldonado          She was seen today for sore throat / cold.            Rodney Sheridan MD on 8/8/2020 at 3:34 PM

## 2020-08-10 LAB
SARS-COV-2 RNA SPEC QL NAA+PROBE: NOT DETECTED
SPECIMEN SOURCE: NORMAL

## 2020-08-16 ENCOUNTER — HOSPITAL ENCOUNTER (EMERGENCY)
Facility: CLINIC | Age: 25
Discharge: HOME | End: 2020-08-16
Attending: EMERGENCY MEDICINE | Admitting: EMERGENCY MEDICINE
Payer: COMMERCIAL

## 2020-08-16 ENCOUNTER — APPOINTMENT (OUTPATIENT)
Dept: GENERAL RADIOLOGY | Facility: CLINIC | Age: 25
End: 2020-08-16
Attending: EMERGENCY MEDICINE
Payer: COMMERCIAL

## 2020-08-16 VITALS
SYSTOLIC BLOOD PRESSURE: 127 MMHG | RESPIRATION RATE: 16 BRPM | OXYGEN SATURATION: 97 % | BODY MASS INDEX: 31.09 KG/M2 | TEMPERATURE: 98.3 F | DIASTOLIC BLOOD PRESSURE: 74 MMHG | WEIGHT: 170 LBS

## 2020-08-16 DIAGNOSIS — S96.912A STRAIN OF LEFT FOOT, INITIAL ENCOUNTER: ICD-10-CM

## 2020-08-16 PROCEDURE — 73630 X-RAY EXAM OF FOOT: CPT | Mod: LT

## 2020-08-16 PROCEDURE — 99282 EMERGENCY DEPT VISIT SF MDM: CPT | Mod: Z6 | Performed by: EMERGENCY MEDICINE

## 2020-08-16 PROCEDURE — 99283 EMERGENCY DEPT VISIT LOW MDM: CPT | Performed by: EMERGENCY MEDICINE

## 2020-08-16 NOTE — ED AVS SNAPSHOT
[facility] Emergency Department  [address] [address]  Phone:  [phone]  Fax:  [phone]                                    Mora Maldonado   MRN: [id number]    Department:  [facility] Emergency Department   Date of Visit:  8/16/2020           After Visit Summary Signature Page    I have received my discharge instructions, and my questions have been answered. I have discussed any challenges I see with this plan with the nurse or doctor.    ..........................................................................................................................................  Patient/Patient Representative Signature      ..........................................................................................................................................  Patient Representative Print Name and Relationship to Patient    ..................................................               ................................................  Date                                   Time    ..........................................................................................................................................  Reviewed by Signature/Title    ...................................................              ..............................................  Date                                               Time          22EPIC Rev 08/18

## 2020-08-17 NOTE — ED PROVIDER NOTES
History     Chief Complaint   Patient presents with     Foot Pain     Left foot- no known injury     HPI  Mora Maldonado is a 25 year old female with no significant past medical history who presents the emergency department complaining of left foot pain.  Patient states she has had pain in her dorsum of her midfoot medially for the past 3 days.  She states it feels like the bones are grinding together.  She denies any significant trauma.  Pain is present at rest but just a dull ache but becomes worse with activity.  She is able ambulate but it just seems to hurt.  She denies any numbness or weakness.  She denies any back pain.  Currently rates her pain a 3 out of 10.  She has been taking ibuprofen and Tylenol with mild improvement of pain.    Allergies:  Allergies   Allergen Reactions     Amoxicillin Hives     Penicillins Hives       Problem List:    Patient Active Problem List    Diagnosis Date Noted     Gallstones 05/12/2020     Priority: Medium     Added automatically from request for surgery [id number]       Adjustment disorder with mixed anxiety and depressed mood 01/20/2020     Priority: Medium     Hypothyroidism, unspecified type 09/09/2019     Priority: Medium     Papanicolaou smear of cervix with low grade squamous intraepithelial lesion (LGSIL) 09/03/2019     Priority: Medium     2014 NIL pap. (Found in Care Everywhere).  9/3/19 LSIL pap. Plan colp.   9/20/19 [place] Bx & ECC - Negative. Plan cotest in 1 year.             Past Medical History:    Past Medical History:   Diagnosis Date     Abnormal Pap smear of cervix 09/03/2019     Thyroid disease        Past Surgical History:    Past Surgical History:   Procedure Laterality Date     LAPAROSCOPIC CHOLECYSTECTOMY N/A 5/15/2020    Procedure: laparoscopic cholecystectomy;  Surgeon: Peter Hidalgo MD;  Location: [facility] OR       Family History:    Family History   Problem Relation Age of Onset     Rheumatoid Arthritis Mother      Thyroid Disease Maternal Grandmother         Social History:  Marital Status:  Single [1]  Social History     Tobacco Use     Smoking status: Never Smoker     Smokeless tobacco: Never Used   Substance Use Topics     Alcohol use: Never     Frequency: Monthly or less     Comment: once a month     Drug use: Never        Medications:    cephALEXin (KEFLEX) 500 MG capsule  escitalopram (LEXAPRO) 20 MG tablet  etonogestrel (IMPLANON/NEXPLANON) 68 MG IMPL  levothyroxine (SYNTHROID/LEVOTHROID) 125 MCG tablet  omeprazole (PRILOSEC) 40 MG DR capsule          Review of Systems  As per HPI.  Physical Exam   BP: 127/74  Heart Rate: 86  Temp: 98.3  F (36.8  C)  Resp: 16  Weight: 77.1 kg (170 lb)  SpO2: 97 %      Physical Exam  Vitals signs and nursing note reviewed.   Constitutional:       General: She is not in acute distress.     Appearance: Normal appearance. She is not ill-appearing or toxic-appearing.   HENT:      Head: Normocephalic.      Nose: Nose normal.   Eyes:      Conjunctiva/sclera: Conjunctivae normal.   Neck:      Musculoskeletal: Normal range of motion.   Pulmonary:      Effort: Pulmonary effort is normal.   Musculoskeletal:      Comments: Left foot with tenderness to palpation of the medial mid dorsum of the foot.  No significant swelling or erythema is present.  Pulses are symmetrical good capillary refill.  There is no calf tenderness.  Patient moves her ankle without difficulty and has no pain.   Skin:     General: Skin is warm and dry.      Capillary Refill: Capillary refill takes less than 2 seconds.      Findings: No rash.   Neurological:      Mental Status: She is alert and oriented to person, place, and time.      Sensory: No sensory deficit.      Motor: No weakness.      Coordination: Coordination normal.   Psychiatric:         Mood and Affect: Mood normal.         ED Course        Procedures               Critical Care time:  none               Results for orders placed or performed during the hospital encounter of 08/16/20 (from the past  24 hour(s))   Foot XR, G/E 3 views, left    Narrative    EXAM: XR FOOT LT G/E 3 VW  LOCATION: [facility]  DATE/TIME: 8/16/2020 7:32 PM    INDICATION: Fall, pain.  COMPARISON: None.      Impression    IMPRESSION: Normal joint spaces and alignment. No evidence of a fracture..       Medications - No data to display    Assessments & Plan (with Medical Decision Making) records were reviewed.  X-ray of the left foot was obtained.  X-ray revealed no obvious fracture dislocation or other abnormality.  This definitely could be a tendon or ligament strain or plantar fasciitis.  Advised patient rest foot is much as possible elevate and take ibuprofen or Tylenol.  I am going to refer her to podiatry and if pain worsen she should stay off it for a few days and see if this improves symptoms.  Patient is agreement this plan.     I have reviewed the nursing notes.    I have reviewed the findings, diagnosis, plan and need for follow up with the patient.       Discharge Medication List as of 8/16/2020  8:30 PM          Final diagnoses:   Strain of left foot, initial encounter       8/16/2020   [facility] EMERGENCY DEPARTMENT     Margarito Garcia MD  08/17/20 [id number]

## 2020-08-17 NOTE — DISCHARGE INSTRUCTIONS
Return if symptoms worsen or new symptoms develop.  Follow-up with primary care physician next available .  Elevate and your foot today.  Take ibuprofen or Tylenol for pain.  If increased pain numbness weakness or other symptoms please return for further evaluation and care.  Follow-up with podiatry next available.

## 2020-08-26 ENCOUNTER — OFFICE VISIT (OUTPATIENT)
Dept: PODIATRY | Facility: CLINIC | Age: 25
End: 2020-08-26
Payer: COMMERCIAL

## 2020-08-26 VITALS
HEART RATE: 97 BPM | HEIGHT: 62 IN | OXYGEN SATURATION: 96 % | BODY MASS INDEX: 33.68 KG/M2 | DIASTOLIC BLOOD PRESSURE: 74 MMHG | WEIGHT: 183 LBS | SYSTOLIC BLOOD PRESSURE: 117 MMHG

## 2020-08-26 DIAGNOSIS — M21.6X1 PRONATION OF BOTH FEET: Primary | ICD-10-CM

## 2020-08-26 DIAGNOSIS — M77.8 CAPSULITIS OF FOOT, LEFT: ICD-10-CM

## 2020-08-26 DIAGNOSIS — M21.6X2 PRONATION OF BOTH FEET: Primary | ICD-10-CM

## 2020-08-26 PROCEDURE — 99203 OFFICE O/P NEW LOW 30 MIN: CPT | Performed by: PODIATRIST

## 2020-08-26 ASSESSMENT — MIFFLIN-ST. JEOR: SCORE: 1518.33

## 2020-08-26 NOTE — PROGRESS NOTES
S: Patient seen today in consult from Dr. Garcia and complains of foot pain.  Points to left foot dorsal first tarsometatarsal joint..  Has had this for 2 weeks.  Describes it as a burning pain.  Aggrevated by activity and relieved by rest.  Slowly getting worse.  Not wearing shoes in the house.  Standing at times at work.  She has never had this before.  She denies erythema edema weakness or ecchymosis    ROS:  A 10-point review of systems was performed and is positive for that noted in the HPI and as seen above.  All other areas are negative.          Allergies   Allergen Reactions     Amoxicillin Hives     Penicillins Hives       Current Outpatient Medications   Medication Sig Dispense Refill     escitalopram (LEXAPRO) 20 MG tablet TAKE 1 TABLET BY MOUTH EVERY DAY 90 tablet 0     etonogestrel (IMPLANON/NEXPLANON) 68 MG IMPL Inject 68 mg Subcutaneous       levothyroxine (SYNTHROID/LEVOTHROID) 125 MCG tablet Take 1 tablet (125 mcg) by mouth daily 30 tablet 11     omeprazole (PRILOSEC) 40 MG DR capsule TAKE 1 CAPSULE BY MOUTH EVERY DAY 90 capsule 0       Patient Active Problem List   Diagnosis     Papanicolaou smear of cervix with low grade squamous intraepithelial lesion (LGSIL)     Hypothyroidism, unspecified type     Adjustment disorder with mixed anxiety and depressed mood     Gallstones       Past Medical History:   Diagnosis Date     Abnormal Pap smear of cervix 09/03/2019    See problem list     Thyroid disease        Past Surgical History:   Procedure Laterality Date     LAPAROSCOPIC CHOLECYSTECTOMY N/A 5/15/2020    Procedure: laparoscopic cholecystectomy;  Surgeon: Peter Hidalgo MD;  Location: [facility]       Family History   Problem Relation Age of Onset     Rheumatoid Arthritis Mother      Thyroid Disease Maternal Grandmother        Social History     Tobacco Use     Smoking status: Never Smoker     Smokeless tobacco: Never Used   Substance Use Topics     Alcohol use: Never     Frequency: Monthly or less      "Comment: once a month         Exam:    Vitals: /74   Pulse 97   Ht 1.575 m (5' 2\")   Wt 83 kg (183 lb)   LMP 08/12/2020 (Approximate)   SpO2 96%   BMI 33.47 kg/m    BMI: Body mass index is 33.47 kg/m .  Height: 5' 2\"    Constitutional/ general:  Pt is in no apparent distress, appears well-nourished.  Cooperative with history and physical exam.     Psych:  The patient answered questions appropriately.  Normal affect.  Seems to have reasonable expectations, in terms of treatment.     Eyes:  Visual scanning/ tracking without deficit.     Ears:  Response to auditory stimuli is normal.  negative hearing aid devices.  Auricles in proper alignment.     Lymphatic:  Popliteal lymph nodes not enlarged.     Lungs:  Non labored breathing, non labored speech. No cough.  No audible wheezing. Even, quiet breathing.       Vascular:  positive pedal pulses bilaterally for both the DP and PT arteries.  CFT < 3 sec.  negative ankle edema.  positive pedal hair growth.    Neuro:  Alert and oriented x 3. Coordinated gait.  Light touch sensation is intact to the L4, L5, S1 distributions. No obvious deficits.  No evidence of neurological-based weakness, spasticity, or contracture in the lower extremities.      Derm: Normal texture and turgor.  No erythema, ecchymosis, or cyanosis.      Musculoskeletal:    Lower extremity muscle strength is normal.  Patient is ambulatory without an assistive device or brace.   Pronated arch with weightbearing.  No forefoot or rear foot deformities noted.  Normal ROM all fore foot and rearfoot joints.  No equinus.    No pain with stressing any muscle compartments.  No erythema edema or ecchymosis or masses noted.  Pain dorsum of left first tarsometatarsal joint.  Very slight edema.  Slight discomfort with range of motion.  No pain on the remaining tarsometatarsal joints.  No pain stressing any tendons.    Radiographic Exam:  X-Ray Findings:  I personally reviewed the films.  Unremarkable    A:  " Pronation with left first tarsometatarsal joint capsulitis    P:  X-rays from past personally reviewed.  Discussed the cause of this with the patient and how this relates to flat foot.  We discussed good supportive shoes both inside and outside the house at all times and I made suggestions.  She will try over-the-counter orthotics.   we also discussed orthotics.  She will call if she would like a pair.  Ice twice daily.  Patient would like something for more immediate relief.  We discussed a Cam walker to offload this.  She would like to try this.  Will dispense cam walker today.  Patient to wear this at all times while walking.  When not walking patient will take this off and do ROM to prevent blood clot and joint stiffness.  Patient will not sleep with this on.  She may also put an over-the-counter arch support in the cam walker.    RETURN TO CLINIC PRN.  Thank you for allowing me participate in the care of this patient.        Peter Mcmahan, LAURA DPCIRO, FACFAS

## 2020-08-26 NOTE — LETTER
8/26/2020         RE: Mora Maldonado  [address] [facility] [address]        Dear Colleague,    Thank you for referring your patient, Mora Maldonado, to the [facility]. Please see a copy of my visit note below.    S: Patient seen today in consult from Dr. Garcia and complains of foot pain.  Points to left foot dorsal first tarsometatarsal joint..  Has had this for 2 weeks.  Describes it as a burning pain.  Aggrevated by activity and relieved by rest.  Slowly getting worse.  Not wearing shoes in the house.  Standing at times at work.  She has never had this before.  She denies erythema edema weakness or ecchymosis    ROS:  A 10-point review of systems was performed and is positive for that noted in the HPI and as seen above.  All other areas are negative.          Allergies   Allergen Reactions     Amoxicillin Hives     Penicillins Hives       Current Outpatient Medications   Medication Sig Dispense Refill     escitalopram (LEXAPRO) 20 MG tablet TAKE 1 TABLET BY MOUTH EVERY DAY 90 tablet 0     etonogestrel (IMPLANON/NEXPLANON) 68 MG IMPL Inject 68 mg Subcutaneous       levothyroxine (SYNTHROID/LEVOTHROID) 125 MCG tablet Take 1 tablet (125 mcg) by mouth daily 30 tablet 11     omeprazole (PRILOSEC) 40 MG DR capsule TAKE 1 CAPSULE BY MOUTH EVERY DAY 90 capsule 0       Patient Active Problem List   Diagnosis     Papanicolaou smear of cervix with low grade squamous intraepithelial lesion (LGSIL)     Hypothyroidism, unspecified type     Adjustment disorder with mixed anxiety and depressed mood     Gallstones       Past Medical History:   Diagnosis Date     Abnormal Pap smear of cervix 09/03/2019    See problem list     Thyroid disease        Past Surgical History:   Procedure Laterality Date     LAPAROSCOPIC CHOLECYSTECTOMY N/A 5/15/2020    Procedure: laparoscopic cholecystectomy;  Surgeon: Peter Hidalgo MD;  Location: [facility]       Family History   Problem Relation Age of Onset      "Rheumatoid Arthritis Mother      Thyroid Disease Maternal Grandmother        Social History     Tobacco Use     Smoking status: Never Smoker     Smokeless tobacco: Never Used   Substance Use Topics     Alcohol use: Never     Frequency: Monthly or less     Comment: once a month         Exam:    Vitals: /74   Pulse 97   Ht 1.575 m (5' 2\")   Wt 83 kg (183 lb)   LMP 08/12/2020 (Approximate)   SpO2 96%   BMI 33.47 kg/m    BMI: Body mass index is 33.47 kg/m .  Height: 5' 2\"    Constitutional/ general:  Pt is in no apparent distress, appears well-nourished.  Cooperative with history and physical exam.     Psych:  The patient answered questions appropriately.  Normal affect.  Seems to have reasonable expectations, in terms of treatment.     Eyes:  Visual scanning/ tracking without deficit.     Ears:  Response to auditory stimuli is normal.  negative hearing aid devices.  Auricles in proper alignment.     Lymphatic:  Popliteal lymph nodes not enlarged.     Lungs:  Non labored breathing, non labored speech. No cough.  No audible wheezing. Even, quiet breathing.       Vascular:  positive pedal pulses bilaterally for both the DP and PT arteries.  CFT < 3 sec.  negative ankle edema.  positive pedal hair growth.    Neuro:  Alert and oriented x 3. Coordinated gait.  Light touch sensation is intact to the L4, L5, S1 distributions. No obvious deficits.  No evidence of neurological-based weakness, spasticity, or contracture in the lower extremities.      Derm: Normal texture and turgor.  No erythema, ecchymosis, or cyanosis.      Musculoskeletal:    Lower extremity muscle strength is normal.  Patient is ambulatory without an assistive device or brace.   Pronated arch with weightbearing.  No forefoot or rear foot deformities noted.  Normal ROM all fore foot and rearfoot joints.  No equinus.    No pain with stressing any muscle compartments.  No erythema edema or ecchymosis or masses noted.  Pain dorsum of left first " tarsometatarsal joint.  Very slight edema.  Slight discomfort with range of motion.  No pain on the remaining tarsometatarsal joints.  No pain stressing any tendons.    Radiographic Exam:  X-Ray Findings:  I personally reviewed the films.  Unremarkable    A:  Pronation with left first tarsometatarsal joint capsulitis    P:  X-rays from past personally reviewed.  Discussed the cause of this with the patient and how this relates to flat foot.  We discussed good supportive shoes both inside and outside the house at all times and I made suggestions.  She will try over-the-counter orthotics.   we also discussed orthotics.  She will call if she would like a pair.  Ice twice daily.  Patient would like something for more immediate relief.  We discussed a Cam walker to offload this.  She would like to try this.  Will dispense cam walker today.  Patient to wear this at all times while walking.  When not walking patient will take this off and do ROM to prevent blood clot and joint stiffness.  Patient will not sleep with this on.  She may also put an over-the-counter arch support in the cam walker.    RETURN TO CLINIC PRN.  Thank you for allowing me participate in the care of this patient.        Peter Mcmahan DPM DPM, FACFAS      Again, thank you for allowing me to participate in the care of your patient.        Sincerely,        Peter Mcmahan DPM

## 2020-08-26 NOTE — PATIENT INSTRUCTIONS
We wish you continued good healing. If you have any questions or concerns, please do not hesitate to contact us at [phone]    Please remember to call and schedule a follow up appointment if one was recommended at your earliest convenience.   PODIATRY CLINIC HOURS  TELEPHONE NUMBER    Dr. Peter Mcmahan D.P.M [facility]    Clinics:  [facility]    [facility]illie Velazquez [facility]   Tuesday 1PM-6PM  [place]  Wednesday 7AM-2PM  [facility]  Thursday 10AM-6PM  [place]  Friday 7AM-3PM  [place]  Specialty schedulers:   [phone] to make an appointment with any Specialty Provider.        Urgent Care locations:    [facility] Monday-Friday 5 pm - 9 pm. Saturday-Sunday 9 am -5pm    Monday-Friday 11 am - 9 pm Saturday 9 am - 5 pm     Monday-Sunday 12 noon-8PM [phone]     If you need a medication refill, please contact us you may need lab work and/or a follow up visit prior to your refill (i.e. Antifungal medications).    [employer] (secure e-mail communication and access to your chart) to send a message or to make an appointment.    If MRI needed please call [facility] at [phone]

## 2020-08-31 DIAGNOSIS — F43.23 ADJUSTMENT DISORDER WITH MIXED ANXIETY AND DEPRESSED MOOD: ICD-10-CM

## 2020-08-31 NOTE — TELEPHONE ENCOUNTER
Requested Prescriptions   Pending Prescriptions Disp Refills     escitalopram (LEXAPRO) 20 MG tablet 90 tablet 0     Sig: Take 1 tablet (20 mg) by mouth daily   Last Written Prescription Date:  8-1-20  Last Fill Quantity: 30,  # refills: 1   Last office visit: 1/20/2020 with prescribing provider:  1-20-20   Future Office Visit:            There is no refill protocol information for this order

## 2020-09-02 RX ORDER — ESCITALOPRAM OXALATE 20 MG/1
20 TABLET ORAL DAILY
Qty: 90 TABLET | Refills: 0 | Status: SHIPPED | OUTPATIENT
Start: 2020-09-02 | End: 2021-11-18

## 2020-09-03 ENCOUNTER — PATIENT OUTREACH (OUTPATIENT)
Dept: OBGYN | Facility: CLINIC | Age: 25
End: 2020-09-03

## 2020-09-03 DIAGNOSIS — R87.612 PAPANICOLAOU SMEAR OF CERVIX WITH LOW GRADE SQUAMOUS INTRAEPITHELIAL LESION (LGSIL): ICD-10-CM

## 2020-09-03 NOTE — LETTER
September 3, 2020      Mora CARBAJAL Erica  [address] [facility] [address]    Dear MsAriellaErica,      At [facility], your health and wellness is our primary concern. That is why we are following up on a colposcopy from 9/20/19. Your provider had recommended that you have a Pap smear and HPV test completed by 9/20/20. Our records do not show that this has been scheduled.    It is important to complete the follow up that your provider has suggested for you to ensure that there are no worsening changes which may, over time, develop into cancer.      Please contact our office at  [phone] to schedule an appointment for a Pap smear and HPV test at your earliest convenience. If you have questions or concerns, please call the clinic and we will be happy to assist you.    If you have completed the tests outside of [facility], please have the results forwarded to our office. We will update the chart for your primary Physician to review before your next annual physical.     Thank you for choosing [facility]!    Sincerely,      Your [facility] Care Team/silke

## 2020-09-07 ENCOUNTER — MYC MEDICAL ADVICE (OUTPATIENT)
Dept: ENDOCRINOLOGY | Facility: CLINIC | Age: 25
End: 2020-09-07

## 2020-09-08 ENCOUNTER — VIRTUAL VISIT (OUTPATIENT)
Dept: ENDOCRINOLOGY | Facility: CLINIC | Age: 25
End: 2020-09-08
Payer: COMMERCIAL

## 2020-09-08 DIAGNOSIS — E03.9 HYPOTHYROIDISM, UNSPECIFIED TYPE: Primary | ICD-10-CM

## 2020-09-08 DIAGNOSIS — E66.811 CLASS 1 OBESITY WITHOUT SERIOUS COMORBIDITY IN ADULT, UNSPECIFIED BMI, UNSPECIFIED OBESITY TYPE: ICD-10-CM

## 2020-09-08 PROCEDURE — 99213 OFFICE O/P EST LOW 20 MIN: CPT | Mod: TEL | Performed by: INTERNAL MEDICINE

## 2020-09-08 NOTE — PROGRESS NOTES
"Mora Maldonado is a 25 year old female who is being evaluated via a billable telephone visit.      The patient has been notified of following:     \"This telephone visit will be conducted via a call between you and your physician/provider. We have found that certain health care needs can be provided without the need for a physical exam.  This service lets us provide the care you need with a short phone conversation.  If a prescription is necessary we can send it directly to your pharmacy.  If lab work is needed we can place an order for that and you can then stop by our lab to have the test done at a later time.    Telephone visits are billed at different rates depending on your insurance coverage. During this emergency period, for some insurers they may be billed the same as an in-person visit.  Please reach out to your insurance provider with any questions.    If during the course of the call the physician/provider feels a telephone visit is not appropriate, you will not be charged for this service.\"    Patient has given verbal consent for Telephone visit?  Yes    What phone number would you like to be contacted at? [phone]    How would you like to obtain your AVS? Yuki    Phone call duration: 10 minutes    Chhaya Victoria CMA    S:   Patient here for evaluation of hypothyroidism.   She went to see her primary care in the summer of 2019 because of trouble losing weight, fatigue, and hair loss.   She was started on 25 mcg levothyroxine daily.   Then increased to 50 and then 100 mcg.   After labs in 11/2019 showed low TSH, her dose was dropped to 88 mcg daily.      She has also been started on lexapro.   She has had Nexplanon for birth control for several years.      Hair has become thicker but is still dry.      She takes the levothyroxine in the AM with her lexapro, 30 minutes before eating.   Denies missing any doses.      Sometimes with drink Herbalife.      She was taking biotin but comments she " "stopped it before her 11/2019 labs.   No iodine or kelp.      Sleeping 10 hours a night and not refreshed in the AM. Tossing and turning. No snoring.      She was having constipation 1 month ago which improved with having more fiber in her diet.      In 4/2020, she has been having abdominal pain, nausea and emesis.   She has doubled her PPI dose and better the last two days.   Her hair is growing thicker and faster.      She is concerned she is gaining weight.   The last two weeks she has been tracking her meals through weight watchers. 26 points a day.   She has been on this for 4 months but tracking more attentively recently.   Walks her dog daily.   Does note less active since COVID.      Sleep is improved. Feeling rested and not having to nap.      He lexapro was increased a few days ago to help with anxiety.      In 8/2020, she gained weight after gall bladder removed in 5/2020.   Sticking to 20-23 points on Weight Watcher every day.   She is having some abdominal pain after eating. Particularly if eating fatty foods.   Activity declined after surgery and is now starting to build back up.      Her hair continues to improve.      In 9/2020, concerned about continued weight gain.   Gym 3/week consisting of 30 minutes on stair steeper and resistance training.   Eating 20-23 points a day on weight watchers. Currently 183 pounds.   She was losing weight prior to surgery using the same diet plan.   Notes she is avoiding using the \"free foods\" as much as she can.     She is also noticing more acne.     She has never used an appetite suppressant.     ROS: 10 point review of systems negative aside from detailed.     A/P:   Hypothyroidism - Extensive discussion of thyroid hormone and normal physiology. Included was discussion of thyroid in relation to weight and energy. She has had trouble getting a consistent TSH level. She has been compliant with taking medication. While she is taking her levothyroxine with lexapro " which can its absorption, this has been a consistent pattern. Based on weight, her full replacement dose would be 114 mcg daily. She was taking biotin close to her 11/2019 labs and during her 10/2019 which could have caused a falsely low reading.   In 1/2020, recommend taking two tablets of 88 mcg on Sundays and 1 tablet the other 6 days of the week.   In 4/2020, labs normal aside from slightly low free T3. Discussed how highly variable this reading is.   In 8/2020, slightly hypothyroid and struggling with post op abdominal pain/bloating after gall bladder removal.   In 9/2020, worried about her weight. BMR 1520. Given weight gain despite sticking to the same diet she lost weight on, I am concerned for possible Cushing's. Discussed risks/benefits of phentermine, topamax, naltrexone reviewed. We would also lower her WW point total if Cushing's rule out.   -Check TSH.   - collection kits for salivary cortisol.     Due to the COVID 19 pandemic this visit was a telephone/video visit in order to help prevent spread of infection in this high risk patient and the general population. The patient gave verbal consent for the visit today.    Start time 1335  Stop time 1351  Total time 16  This visit would have been billed as 22925 as an E & M code    Stephan Robert MD on 9/8/2020 at 2:10 PM

## 2020-09-08 NOTE — LETTER
"    9/8/2020         RE: Mora Maldonado  [address] [facility] [address]        Dear Colleague,    Thank you for referring your patient, Mora Maldonado, to the [facility]. Please see a copy of my visit note below.    Mora Maldonado is a 25 year old female who is being evaluated via a billable telephone visit.      The patient has been notified of following:     \"This telephone visit will be conducted via a call between you and your physician/provider. We have found that certain health care needs can be provided without the need for a physical exam.  This service lets us provide the care you need with a short phone conversation.  If a prescription is necessary we can send it directly to your pharmacy.  If lab work is needed we can place an order for that and you can then stop by our lab to have the test done at a later time.    Telephone visits are billed at different rates depending on your insurance coverage. During this emergency period, for some insurers they may be billed the same as an in-person visit.  Please reach out to your insurance provider with any questions.    If during the course of the call the physician/provider feels a telephone visit is not appropriate, you will not be charged for this service.\"    Patient has given verbal consent for Telephone visit?  Yes    What phone number would you like to be contacted at? [phone]    How would you like to obtain your AVS? [place]    Phone call duration: 10 minutes    Chhaya Victoria CMA    S:   Patient here for evaluation of hypothyroidism.   She went to see her primary care in the summer of 2019 because of trouble losing weight, fatigue, and hair loss.   She was started on 25 mcg levothyroxine daily.   Then increased to 50 and then 100 mcg.   After labs in 11/2019 showed low TSH, her dose was dropped to 88 mcg daily.      She has also been started on lexapro.   She has had Nexplanon for birth control for several years. " "     Hair has become thicker but is still dry.      She takes the levothyroxine in the AM with her lexapro, 30 minutes before eating.   Denies missing any doses.      Sometimes with drink Herbalife.      She was taking biotin but comments she stopped it before her 11/2019 labs.   No iodine or kelp.      Sleeping 10 hours a night and not refreshed in the AM. Tossing and turning. No snoring.      She was having constipation 1 month ago which improved with having more fiber in her diet.      In 4/2020, she has been having abdominal pain, nausea and emesis.   She has doubled her PPI dose and better the last two days.   Her hair is growing thicker and faster.      She is concerned she is gaining weight.   The last two weeks she has been tracking her meals through weight watchers. 26 points a day.   She has been on this for 4 months but tracking more attentively recently.   Walks her dog daily.   Does note less active since COVID.      Sleep is improved. Feeling rested and not having to nap.      He lexapro was increased a few days ago to help with anxiety.      In 8/2020, she gained weight after gall bladder removed in 5/2020.   Sticking to 20-23 points on Weight Watcher every day.   She is having some abdominal pain after eating. Particularly if eating fatty foods.   Activity declined after surgery and is now starting to build back up.      Her hair continues to improve.      In 9/2020, concerned about continued weight gain.   Gym 3/week consisting of 30 minutes on stair steeper and resistance training.   Eating 20-23 points a day on weight watchers. Currently 183 pounds.   She was losing weight prior to surgery using the same diet plan.   Notes she is avoiding using the \"free foods\" as much as she can.     She is also noticing more acne.     She has never used an appetite suppressant.     ROS: 10 point review of systems negative aside from detailed.     A/P:   Hypothyroidism - Extensive discussion of thyroid hormone " and normal physiology. Included was discussion of thyroid in relation to weight and energy. She has had trouble getting a consistent TSH level. She has been compliant with taking medication. While she is taking her levothyroxine with lexapro which can its absorption, this has been a consistent pattern. Based on weight, her full replacement dose would be 114 mcg daily. She was taking biotin close to her 11/2019 labs and during her 10/2019 which could have caused a falsely low reading.   In 1/2020, recommend taking two tablets of 88 mcg on Sundays and 1 tablet the other 6 days of the week.   In 4/2020, labs normal aside from slightly low free T3. Discussed how highly variable this reading is.   In 8/2020, slightly hypothyroid and struggling with post op abdominal pain/bloating after gall bladder removal.   In 9/2020, worried about her weight. BMR 1520. Given weight gain despite sticking to the same diet she lost weight on, I am concerned for possible Cushing's. Discussed risks/benefits of phentermine, topamax, naltrexone reviewed. We would also lower her WW point total if Cushing's rule out.   -Check TSH.   - collection kits for salivary cortisol.     Due to the COVID 19 pandemic this visit was a telephone/video visit in order to help prevent spread of infection in this high risk patient and the general population. The patient gave verbal consent for the visit today.    Start time 1335  Stop time 1351  Total time 16  This visit would have been billed as 67625 as an E & M code    Stephan Robert MD on 9/8/2020 at 2:10 PM            Again, thank you for allowing me to participate in the care of your patient.        Sincerely,        Stephan Robert MD

## 2020-09-09 DIAGNOSIS — E03.9 HYPOTHYROIDISM, UNSPECIFIED TYPE: ICD-10-CM

## 2020-09-09 LAB — TSH SERPL DL<=0.005 MIU/L-ACNC: 0.58 MU/L (ref 0.4–4)

## 2020-09-09 PROCEDURE — 84443 ASSAY THYROID STIM HORMONE: CPT | Performed by: INTERNAL MEDICINE

## 2020-09-09 PROCEDURE — 36415 COLL VENOUS BLD VENIPUNCTURE: CPT | Performed by: INTERNAL MEDICINE

## 2020-09-10 DIAGNOSIS — E03.9 HYPOTHYROIDISM, UNSPECIFIED TYPE: Primary | ICD-10-CM

## 2020-09-10 PROCEDURE — 82530 CORTISOL FREE: CPT | Mod: 91 | Performed by: FAMILY MEDICINE

## 2020-09-10 PROCEDURE — 99000 SPECIMEN HANDLING OFFICE-LAB: CPT | Performed by: FAMILY MEDICINE

## 2020-09-11 DIAGNOSIS — E66.811 CLASS 1 OBESITY WITHOUT SERIOUS COMORBIDITY IN ADULT, UNSPECIFIED BMI, UNSPECIFIED OBESITY TYPE: ICD-10-CM

## 2020-09-14 DIAGNOSIS — E03.9 HYPOTHYROIDISM, UNSPECIFIED TYPE: ICD-10-CM

## 2020-09-14 RX ORDER — LEVOTHYROXINE SODIUM 125 UG/1
125 TABLET ORAL DAILY
Qty: 30 TABLET | Refills: 11 | Status: SHIPPED | OUTPATIENT
Start: 2020-09-14 | End: 2021-01-07

## 2020-09-14 NOTE — TELEPHONE ENCOUNTER
Pending Prescriptions:                       Disp   Refills    levothyroxine (SYNTHROID/LEVOTHROID) 125 *30 tab*11           Sig: Take 1 tablet (125 mcg) by mouth daily    Last filled 7/2/2020

## 2020-09-15 ENCOUNTER — MYC MEDICAL ADVICE (OUTPATIENT)
Dept: ENDOCRINOLOGY | Facility: CLINIC | Age: 25
End: 2020-09-15

## 2020-09-15 DIAGNOSIS — E66.811 CLASS 1 OBESITY WITHOUT SERIOUS COMORBIDITY IN ADULT, UNSPECIFIED BMI, UNSPECIFIED OBESITY TYPE: Primary | ICD-10-CM

## 2020-09-15 LAB
COLLECT TME SPEC: NORMAL
COLLECT TME SPEC: NORMAL
CORTIS SAL-MCNC: 0.07 UG/DL
CORTIS SAL-MCNC: 0.11 UG/DL

## 2020-09-15 RX ORDER — PHENTERMINE HYDROCHLORIDE 15 MG/1
15 CAPSULE ORAL EVERY MORNING
Qty: 30 CAPSULE | Refills: 5 | Status: SHIPPED | OUTPATIENT
Start: 2020-09-15 | End: 2021-03-15

## 2020-09-24 DIAGNOSIS — K21.9 GASTROESOPHAGEAL REFLUX DISEASE, ESOPHAGITIS PRESENCE NOT SPECIFIED: ICD-10-CM

## 2020-09-24 RX ORDER — OMEPRAZOLE 40 MG/1
CAPSULE, DELAYED RELEASE ORAL
Qty: 90 CAPSULE | Refills: 1 | Status: SHIPPED | OUTPATIENT
Start: 2020-09-24 | End: 2020-11-11

## 2020-10-16 ENCOUNTER — PATIENT OUTREACH (OUTPATIENT)
Dept: FAMILY MEDICINE | Facility: CLINIC | Age: 25
End: 2020-10-16

## 2020-10-16 DIAGNOSIS — R87.612 PAPANICOLAOU SMEAR OF CERVIX WITH LOW GRADE SQUAMOUS INTRAEPITHELIAL LESION (LGSIL): ICD-10-CM

## 2020-10-25 ENCOUNTER — MYC MEDICAL ADVICE (OUTPATIENT)
Dept: FAMILY MEDICINE | Facility: CLINIC | Age: 25
End: 2020-10-25

## 2020-10-26 ENCOUNTER — VIRTUAL VISIT (OUTPATIENT)
Dept: URGENT CARE | Facility: CLINIC | Age: 25
End: 2020-10-26
Payer: COMMERCIAL

## 2020-10-26 DIAGNOSIS — R09.89 RUNNY NOSE: Primary | ICD-10-CM

## 2020-10-26 PROCEDURE — 99213 OFFICE O/P EST LOW 20 MIN: CPT | Mod: TEL | Performed by: NURSE PRACTITIONER

## 2020-10-26 NOTE — PROGRESS NOTES
SUBJECTIVE:   Mora Maldonado is a 25 year old female presenting with a chief complaint of cold symptoms.   Onset of symptoms was 1 days ago.  Current and Associated symptoms: sore throat congestion headache  No cough sob wheezing fever chills.   Treatment measures tried include : rest    Past Medical History:   Diagnosis Date     Abnormal Pap smear of cervix 09/03/2019    See problem list     Thyroid disease      Current Outpatient Medications   Medication Sig Dispense Refill     escitalopram (LEXAPRO) 20 MG tablet Take 1 tablet (20 mg) by mouth daily 90 tablet 0     etonogestrel (IMPLANON/NEXPLANON) 68 MG IMPL Inject 68 mg Subcutaneous       levothyroxine (SYNTHROID/LEVOTHROID) 125 MCG tablet Take 1 tablet (125 mcg) by mouth daily 30 tablet 11     omeprazole (PRILOSEC) 40 MG DR capsule TAKE 1 CAPSULE BY MOUTH EVERY DAY 90 capsule 1     phentermine (ADIPEX-P) 15 MG capsule Take 1 capsule (15 mg) by mouth every morning 30 capsule 5     Social History     Tobacco Use     Smoking status: Never Smoker     Smokeless tobacco: Never Used   Substance Use Topics     Alcohol use: Never     Frequency: Monthly or less     Comment: once a month       ROS:  CONSTITUTIONAL:NEGATIVE for fever, chills, change in weight  INTEGUMENTARY/SKIN: NEGATIVE for worrisome rashes, moles or lesions  EYES: NEGATIVE for vision changes or irritation  ENT/MOUTH: POSITIVE for runny nose, sore throat  RESP:NEGATIVE for significant cough or SOB    OBJECTIVE:  GENERAL APPEARANCE: alert and no distress  RESP: lungs clear   CV: regular rates and rhythm  SKIN: no suspicious lesions or rashes    ASSESSMENT:  (R09.89) Runny nose  (primary encounter diagnosis)    Plan: Symptomatic COVID-19 Virus (Coronavirus) by PCR  Will test  Isolate as discussed  Home treat and monitor symptoms follow up as needed    Telephone time spent 11 minutes  SHAWNEE Payan CNP

## 2020-10-29 DIAGNOSIS — R09.89 RUNNY NOSE: ICD-10-CM

## 2020-10-29 PROCEDURE — U0003 INFECTIOUS AGENT DETECTION BY NUCLEIC ACID (DNA OR RNA); SEVERE ACUTE RESPIRATORY SYNDROME CORONAVIRUS 2 (SARS-COV-2) (CORONAVIRUS DISEASE [COVID-19]), AMPLIFIED PROBE TECHNIQUE, MAKING USE OF HIGH THROUGHPUT TECHNOLOGIES AS DESCRIBED BY CMS-2020-01-R: HCPCS | Performed by: NURSE PRACTITIONER

## 2020-10-30 LAB
SARS-COV-2 RNA SPEC QL NAA+PROBE: NOT DETECTED
SPECIMEN SOURCE: NORMAL

## 2020-11-11 ENCOUNTER — OFFICE VISIT (OUTPATIENT)
Dept: FAMILY MEDICINE | Facility: CLINIC | Age: 25
End: 2020-11-11
Payer: COMMERCIAL

## 2020-11-11 ENCOUNTER — MYC MEDICAL ADVICE (OUTPATIENT)
Dept: FAMILY MEDICINE | Facility: CLINIC | Age: 25
End: 2020-11-11

## 2020-11-11 VITALS
BODY MASS INDEX: 33.03 KG/M2 | SYSTOLIC BLOOD PRESSURE: 114 MMHG | DIASTOLIC BLOOD PRESSURE: 72 MMHG | WEIGHT: 179.5 LBS | HEIGHT: 62 IN | HEART RATE: 94 BPM | RESPIRATION RATE: 16 BRPM | OXYGEN SATURATION: 99 % | TEMPERATURE: 97.8 F

## 2020-11-11 DIAGNOSIS — E78.1 HYPERTRIGLYCERIDEMIA: ICD-10-CM

## 2020-11-11 DIAGNOSIS — R87.612 PAPANICOLAOU SMEAR OF CERVIX WITH LOW GRADE SQUAMOUS INTRAEPITHELIAL LESION (LGSIL): ICD-10-CM

## 2020-11-11 DIAGNOSIS — Z00.00 ROUTINE GENERAL MEDICAL EXAMINATION AT A HEALTH CARE FACILITY: Primary | ICD-10-CM

## 2020-11-11 DIAGNOSIS — N89.8 VAGINAL DISCHARGE: ICD-10-CM

## 2020-11-11 DIAGNOSIS — Z12.4 CERVICAL CANCER SCREENING: ICD-10-CM

## 2020-11-11 DIAGNOSIS — Z11.51 SCREENING FOR HUMAN PAPILLOMAVIRUS: ICD-10-CM

## 2020-11-11 PROBLEM — K80.20 GALLSTONES: Status: RESOLVED | Noted: 2020-05-12 | Resolved: 2020-11-11

## 2020-11-11 LAB
CHOLEST SERPL-MCNC: 221 MG/DL
HDLC SERPL-MCNC: 42 MG/DL
LDLC SERPL CALC-MCNC: 159 MG/DL
NONHDLC SERPL-MCNC: 179 MG/DL
SPECIMEN SOURCE: NORMAL
TRIGL SERPL-MCNC: 101 MG/DL
WET PREP SPEC: NORMAL

## 2020-11-11 PROCEDURE — 80061 LIPID PANEL: CPT | Performed by: NURSE PRACTITIONER

## 2020-11-11 PROCEDURE — 99214 OFFICE O/P EST MOD 30 MIN: CPT | Mod: 25 | Performed by: NURSE PRACTITIONER

## 2020-11-11 PROCEDURE — 87624 HPV HI-RISK TYP POOLED RSLT: CPT | Performed by: NURSE PRACTITIONER

## 2020-11-11 PROCEDURE — 99395 PREV VISIT EST AGE 18-39: CPT | Performed by: NURSE PRACTITIONER

## 2020-11-11 PROCEDURE — G0145 SCR C/V CYTO,THINLAYER,RESCR: HCPCS | Performed by: NURSE PRACTITIONER

## 2020-11-11 PROCEDURE — 87210 SMEAR WET MOUNT SALINE/INK: CPT | Performed by: NURSE PRACTITIONER

## 2020-11-11 PROCEDURE — 36415 COLL VENOUS BLD VENIPUNCTURE: CPT | Performed by: NURSE PRACTITIONER

## 2020-11-11 ASSESSMENT — ENCOUNTER SYMPTOMS
ABDOMINAL PAIN: 1
BREAST MASS: 0
NERVOUS/ANXIOUS: 1
DIARRHEA: 1

## 2020-11-11 ASSESSMENT — PATIENT HEALTH QUESTIONNAIRE - PHQ9
SUM OF ALL RESPONSES TO PHQ QUESTIONS 1-9: 10
10. IF YOU CHECKED OFF ANY PROBLEMS, HOW DIFFICULT HAVE THESE PROBLEMS MADE IT FOR YOU TO DO YOUR WORK, TAKE CARE OF THINGS AT HOME, OR GET ALONG WITH OTHER PEOPLE: SOMEWHAT DIFFICULT
SUM OF ALL RESPONSES TO PHQ QUESTIONS 1-9: 10

## 2020-11-11 ASSESSMENT — MIFFLIN-ST. JEOR: SCORE: 1505.71

## 2020-11-11 NOTE — PROGRESS NOTES
SUBJECTIVE:   CC: Mora Maldonado is an 25 year old woman who presents for preventive health visit.       Patient has been advised of split billing requirements and indicates understanding: Yes     Patient would also like nexplanon removed and reinserted.     Healthy Habits:     Getting at least 3 servings of Calcium per day:  Yes    Bi-annual eye exam:  NO    Dental care twice a year:  NO    Sleep apnea or symptoms of sleep apnea:  Daytime drowsiness    Diet:  Low salt, Low fat/cholesterol and Breakfast skipped    Frequency of exercise:  4-5 days/week    Duration of exercise:  45-60 minutes    Taking medications regularly:  Yes    Medication side effects:  None    PHQ-2 Total Score: 3    Additional concerns today:  Yes (bowel movements have been either diarrhea or solid, no in between, abdominal pain since gall bladder removal this summer)        PROBLEMS TO ADD ON...Nexplanon in place patient is requesting to have this removed and a new one or other type discussion what her options are.   Is having symptoms of diarrhea/constipation she has history of constipation. She recently had cholecystectomy. Also diagnosed with hypothyroidism. She is seeing endo for this and is taking Phentermine has been trying to loose weight working out 4-5 times per week and eating balanced diet. She endorses drinking 4-5 coke zero daily also drinks water 32 oz 2-3 per day.   She has history of elevated triglycerides states she was on a high protein diet at the time.   Also c/o vaginal discharge and mild pruritis.     Today's PHQ-2 Score:   PHQ-2 ( 1999 Pfizer) 11/11/2020   Q1: Little interest or pleasure in doing things 1   Q2: Feeling down, depressed or hopeless 2   PHQ-2 Score 3   Q1: Little interest or pleasure in doing things Several days   Q2: Feeling down, depressed or hopeless More than half the days   PHQ-2 Score 3     Answers for HPI/ROS submitted by the patient on 11/11/2020   Annual Exam:  If you checked off any  problems, how difficult have these problems made it for you to do your work, take care of things at home, or get along with other people?: Somewhat difficult  PHQ9 TOTAL SCORE: 10      Abuse: Current or Past (Physical, Sexual or Emotional) - No  Do you feel safe in your environment? Yes        Social History     Tobacco Use     Smoking status: Never Smoker     Smokeless tobacco: Never Used   Substance Use Topics     Alcohol use: Never     Frequency: Monthly or less     Comment: once a month         Alcohol Use 11/11/2020   Prescreen: >3 drinks/day or >7 drinks/week? Not Applicable   No flowsheet data found.    Reviewed orders with patient.  Reviewed health maintenance and updated orders accordingly - Yes  Lab work is in process  Labs reviewed in EPIC  BP Readings from Last 3 Encounters:   11/11/20 114/72   08/26/20 117/74   08/16/20 127/74    Wt Readings from Last 3 Encounters:   11/11/20 81.4 kg (179 lb 8 oz)   08/26/20 83 kg (183 lb)   08/16/20 77.1 kg (170 lb)                  Patient Active Problem List   Diagnosis     Papanicolaou smear of cervix with low grade squamous intraepithelial lesion (LGSIL)     Hypothyroidism, unspecified type     Adjustment disorder with mixed anxiety and depressed mood     Past Surgical History:   Procedure Laterality Date     LAPAROSCOPIC CHOLECYSTECTOMY N/A 5/15/2020    Procedure: laparoscopic cholecystectomy;  Surgeon: Peetr Hidalgo MD;  Location: [facility]       Social History     Tobacco Use     Smoking status: Never Smoker     Smokeless tobacco: Never Used   Substance Use Topics     Alcohol use: Never     Frequency: Monthly or less     Comment: once a month     Family History   Problem Relation Age of Onset     Rheumatoid Arthritis Mother      Thyroid Disease Maternal Grandmother      Hypertension Sister          Current Outpatient Medications   Medication Sig Dispense Refill     escitalopram (LEXAPRO) 20 MG tablet Take 1 tablet (20 mg) by mouth daily 90 tablet 0     etonogestrel  (IMPLANON/NEXPLANON) 68 MG IMPL Inject 68 mg Subcutaneous       levothyroxine (SYNTHROID/LEVOTHROID) 125 MCG tablet Take 1 tablet (125 mcg) by mouth daily 30 tablet 11     phentermine (ADIPEX-P) 15 MG capsule Take 1 capsule (15 mg) by mouth every morning 30 capsule 5     Allergies   Allergen Reactions     Amoxicillin Hives     Penicillins Hives     Recent Labs   Lab Test 09/09/20  1421 07/31/20 2017 05/19/20  2118 05/13/20  1722 05/05/20  1822 11/12/19  1650 11/12/19  1650   A1C  --   --   --   --   --   --  4.9   LDL  --   --   --   --   --   --  62   HDL  --   --   --   --   --   --  32*   TRIG  --   --   --   --   --   --  195*   ALT  --   --  31 21 20   < >  --    CR  --   --  0.69 0.77 0.74   < >  --    GFRESTIMATED  --   --  >90 >90 >90   < >  --    GFRESTBLACK  --   --  >90 >90 >90   < >  --    POTASSIUM  --   --  3.9 4.1 4.1   < >  --    TSH 0.58 6.41*  --  6.56*  --    < > 0.12*    < > = values in this interval not displayed.        Mammogram not appropriate for this patient based on age.    Pertinent mammograms are reviewed under the imaging tab.  History of abnormal Pap smear:   Last 3 Pap and HPV Results:   PAP / HPV 9/3/2019   PAP LSIL(A)     PAP / HPV 9/3/2019   PAP LSIL(A)     Reviewed and updated as needed this visit by clinical staff  Tobacco  Allergies  Meds   Med Hx  Surg Hx  Fam Hx  Soc Hx        Reviewed and updated as needed this visit by Provider    Review of Systems   Eyes: Positive for visual disturbance.   Gastrointestinal: Positive for abdominal pain and diarrhea.   Breasts:  Negative for tenderness, breast mass and discharge.   Genitourinary: Positive for pelvic pain, urgency and vaginal discharge. Negative for vaginal bleeding.   Psychiatric/Behavioral: The patient is nervous/anxious.      CONSTITUTIONAL: NEGATIVE for fever, chills, change in weight  INTEGUMENTARU/SKIN: NEGATIVE for worrisome rashes, moles or lesions  EYES: NEGATIVE for vision changes or irritation  ENT: NEGATIVE  "for ear, mouth and throat problems  RESP: NEGATIVE for significant cough or SOB  BREAST: NEGATIVE for masses, tenderness or discharge  CV: NEGATIVE for chest pain, palpitations or peripheral edema  GI: NEGATIVE for nausea, abdominal pain, heartburn, or change in bowel habits  : NEGATIVE for unusual urinary or vaginal symptoms. Periods are regular.  MUSCULOSKELETAL: NEGATIVE for significant arthralgias or myalgia  NEURO: NEGATIVE for weakness, dizziness or paresthesias  ENDOCRINE: NEGATIVE for temperature intolerance, skin/hair changes  HEME/ALLERGY/IMMUNE: NEGATIVE for bleeding problems  PSYCHIATRIC: NEGATIVE for changes in mood or affect     OBJECTIVE:   /72   Pulse 94   Temp 97.8  F (36.6  C) (Temporal)   Resp 16   Ht 1.58 m (5' 2.21\")   Wt 81.4 kg (179 lb 8 oz)   LMP  (LMP Unknown)   SpO2 99%   BMI 32.61 kg/m    Physical Exam  GENERAL: healthy, alert and no distress  EYES: Eyes grossly normal to inspection, PERRL and conjunctivae and sclerae normal  HENT: ear canals and TM's normal, nose and mouth without ulcers or lesions  NECK: no adenopathy, no asymmetry, masses, or scars and thyroid normal to palpation  RESP: lungs clear to auscultation - no rales, rhonchi or wheezes  BREAST: normal without masses, tenderness or nipple discharge and no palpable axillary masses or adenopathy  CV: regular rate and rhythm, normal S1 S2, no S3 or S4, no murmur, click or rub, no peripheral edema and peripheral pulses strong  ABDOMEN: soft, nontender, no hepatosplenomegaly, no masses and bowel sounds normal   (female): normal female external genitalia, normal urethral meatus, vaginal mucosa pink, moist, well rugated, and normal cervix/adnexa/uterus without masses or discharge  RECTAL: normal sphincter tone, no rectal masses  MS: no gross musculoskeletal defects noted, no edema  SKIN: no suspicious lesions or rashes  NEURO: Normal strength and tone, mentation intact and speech normal  PSYCH: mentation appears " "normal, affect normal/bright  LYMPH: no cervical, supraclavicular, axillary, or inguinal adenopathy    Diagnostic Test Results:  Labs reviewed in Epic  Results for orders placed or performed in visit on 11/11/20 (from the past 24 hour(s))   Wet prep    Specimen: Vagina   Result Value Ref Range    Specimen Description Vagina     Wet Prep No Trichomonas seen     Wet Prep No clue cells seen     Wet Prep No yeast seen     Wet Prep No WBC's seen        ASSESSMENT/PLAN:   1. Routine general medical examination at a health care facility  Reviewed recommended screenings and ordered appropriate testing for pt's risks and per pt's request(s).       2. Cervical cancer screening  completed  - Pap imaged thin layer screen reflex to HPV if ASCUS - recommend age 25 - 29    3. Vaginal discharge  negative  - Wet prep    4. Hypertriglyceridemia  Will recheck this today she is fasting.   - Lipid panel reflex to direct LDL Fasting    5. Papanicolaou smear of cervix with low grade squamous intraepithelial lesion (LGSIL)    - Pap imaged thin layer screen reflex to HPV if ASCUS - recommend age 25 - 29    Patient has been advised of split billing requirements and indicates understanding: Yes  COUNSELING:  Reviewed preventive health counseling, as reflected in patient instructions       Regular exercise       Healthy diet/nutrition       Immunizations             Contraception       Safe sex practices/STD prevention       Consider Hep C screening for all patients one time for ages 18-79 years    Estimated body mass index is 32.61 kg/m  as calculated from the following:    Height as of this encounter: 1.58 m (5' 2.21\").    Weight as of this encounter: 81.4 kg (179 lb 8 oz).    Weight management plan: Discussed healthy diet and exercise guidelines    She reports that she has never smoked. She has never used smokeless tobacco.      Counseling Resources:  ATP IV Guidelines  Pooled Cohorts Equation Calculator  Breast Cancer Risk " Calculator  BRCA-Related Cancer Risk Assessment: FHS-7 Tool  FRAX Risk Assessment  ICSI Preventive Guidelines  Dietary Guidelines for Americans, 2010  USDA's MyPlate  ASA Prophylaxis  Lung CA Screening    SHAWNEE Keyes CNP  [facility]

## 2020-11-11 NOTE — RESULT ENCOUNTER NOTE
Mora  Your results were normal. Please contact me if you have any questions through my-chart or at [phone].    Chaparrita ANNCNP

## 2020-11-12 ASSESSMENT — PATIENT HEALTH QUESTIONNAIRE - PHQ9: SUM OF ALL RESPONSES TO PHQ QUESTIONS 1-9: 10

## 2020-11-13 LAB
COPATH REPORT: NORMAL
PAP: NORMAL

## 2020-11-13 NOTE — RESULT ENCOUNTER NOTE
Please advise Mora Maldonado, KIMBER 1995,  The results of your recent lipid (cholesterol) profile were abnormal.    Here are the results:  Lab Results       Component                Value               Date                       CHOL                     221                 2020            Lab Results       Component                Value               Date                       HDL                      42                  2020            Lab Results       Component                Value               Date                       LDL                      159                 2020            Lab Results       Component                Value               Date                       TRIG                     101                 2020            No results found for: CHOLHDLRATIO    Desired or goal levels are:  CHOLESTEROL: Desirable is less than 200.   HDL (Good Cholesterol): Desirable is greater than 40 (for men) greater than 50 (for women).  LDL (Bad Cholesterol): Desirable is less than 130 (or less than 100 if you have heart disease or diabetes). Borderline 130-160.  TRIGLYCERIDES: Desirable is less than 150.  Borderline is 150-200.    Your HDL (the good cholesterol) level is low, no medication is required at this point. Reducing your total fat intake, increasing exercise level, reducing weight, adding olive oil to your diet, etc, may help to increase this level..    As you may know, an elevated cholesterol is one factor that increases your risk for heart disease and stroke. You can improve your cholesterol by controlling the amount and type of fat you eat and by increasing your daily activity level.    Here are some ways to improve your nutrition:  Eat less fat (especially butter, Crisco and other saturated fats)  Buy lean cuts of meat, reduce your portions of red meat or substitute poultry or fish  Use skim milk and low-fat dairy products  Eat no more than 4 egg yolks per week  Avoid fried or  fast foods that are high in fat  Eat more fruits and vegetables      Please feel free to contact us with any questions or if you would like more information.            [phone] (home)   Thank you  Chaparrita Mi CNP

## 2020-11-17 NOTE — PROGRESS NOTES
"  NEXPLANON REMOVAL/REINSERTION:    Is a pregnancy test required: {Pregnancy test required:102334}  Was a consent obtained?  {Yes/No:934572::\"Yes\"}    Subjective: Mora Maldonado is a 25 year old  presents for Nexplanon.    Patient has been given the opportunity to ask questions about all forms of birth control, including all options appropriate for Mora Maldonado. Discussed that no method of birth control, except abstinence is 100% effective against pregnancy or sexually transmitted infection.     Mora Maldonado understands planning removal and reinsertion today    The entire removal and insertion procedure was reviewed with the patient, including care after placement.      LMP  (LMP Unknown)     PROCEDURE NOTE: -- Nexplanon Removal/Insertion    Technique: On the {left/right:101882} arm  Skin prep {BETADINE/TECHNICARE:562729581}  Anesthesia {LIDOCAINE:218813570}  Procedure: Patient was placed supine with {left/right:012882} arm exposed.  Implant located by palpitation. Leonardo was made 8-10 cm above medial epicondyle and a guiding leonardo 4 cm above the first.  Arm was prepped with {cleansing agents:042179::\"Betadine\"}. Incision point was anesthetized with *** mL 1% lidocaine.     Small incision (<5mm) was made at distal end of palpable implant, curved hemostat or mosquito forceps was used to isolate the implant and bring it to the incision, the fibrous capsule containing the implant  was incised and the implant was removed intact.    After stretching the skin with thumb and index finger around the insertion site, skin punctured with the tip of the needle inserted at 30 degrees and then lowered to horizontal position. While lifting the skin with the tip of the needle, inserted the needle to its full length. Applicator was then stabilized and the slider was unlocked by pushing it slightly down. Slider moved back completely until it stopped. Applicator was then removed.    Correct placement of the implant " was confirmed by palpation in the patient's arm and visualizing the purple top of the obturator.   Bandage and pressure dressing applied to insertion site.    Lot # ***  Exp: ***    EBL: minimal    Complications: none    ASSESSMENT:     ICD-10-CM    1. Need for hepatitis C screening test  Z11.59 Hepatitis C Screen Reflex to HCV RNA Quant and Genotype        PLAN:    Given 's handouts, including when to have Nexplanon removed, list of danger s/sx, side effects and follow up recommended. Encouraged condom use for prevention of STD. Back up contraception advised for 7 days. Advised to call for any fever, for prolonged or severe pain or bleeding, abnormal vaginal dischage. She was advised to use pain medications (ibuprofen) as needed for mild to moderate pain.     VENKAT AbreuC

## 2020-11-18 ENCOUNTER — PATIENT OUTREACH (OUTPATIENT)
Dept: FAMILY MEDICINE | Facility: OTHER | Age: 25
End: 2020-11-18

## 2020-11-18 LAB
FINAL DIAGNOSIS: NORMAL
HPV HR 12 DNA CVX QL NAA+PROBE: NEGATIVE
HPV16 DNA SPEC QL NAA+PROBE: NEGATIVE
HPV18 DNA SPEC QL NAA+PROBE: NEGATIVE
SPECIMEN DESCRIPTION: NORMAL
SPECIMEN SOURCE CVX/VAG CYTO: NORMAL

## 2020-11-18 NOTE — TELEPHONE ENCOUNTER
2014 NIL pap. (Found in Care Everywhere).  9/3/19 LSIL pap. Plan colp.   9/20/19 [facility]x [facility] - Negative. Plan cotest in 1 year.   9/3/20 Reminder letter/My Chart sent  10/16/20 [facility] clinic and schedule.  11/11/20 NIL Pap, Neg HR HPV. Plan: cotest in 3 years.

## 2020-11-19 ENCOUNTER — OFFICE VISIT (OUTPATIENT)
Dept: FAMILY MEDICINE | Facility: OTHER | Age: 25
End: 2020-11-19
Payer: COMMERCIAL

## 2020-11-19 VITALS
HEART RATE: 87 BPM | SYSTOLIC BLOOD PRESSURE: 128 MMHG | OXYGEN SATURATION: 100 % | HEIGHT: 62 IN | TEMPERATURE: 98.3 F | RESPIRATION RATE: 18 BRPM | WEIGHT: 184 LBS | BODY MASS INDEX: 33.86 KG/M2 | DIASTOLIC BLOOD PRESSURE: 74 MMHG

## 2020-11-19 DIAGNOSIS — Z97.5 IUD (INTRAUTERINE DEVICE) IN PLACE: ICD-10-CM

## 2020-11-19 DIAGNOSIS — Z30.46 NEXPLANON REMOVAL: ICD-10-CM

## 2020-11-19 DIAGNOSIS — Z30.430 ENCOUNTER FOR INSERTION OF INTRAUTERINE CONTRACEPTIVE DEVICE: Primary | ICD-10-CM

## 2020-11-19 DIAGNOSIS — Z87.42 HISTORY OF OVARIAN CYST: ICD-10-CM

## 2020-11-19 PROCEDURE — 99207 PR DROP WITH A PROCEDURE: CPT | Performed by: PHYSICIAN ASSISTANT

## 2020-11-19 PROCEDURE — 11982 REMOVE DRUG IMPLANT DEVICE: CPT | Performed by: PHYSICIAN ASSISTANT

## 2020-11-19 PROCEDURE — 87491 CHLMYD TRACH DNA AMP PROBE: CPT | Performed by: PHYSICIAN ASSISTANT

## 2020-11-19 PROCEDURE — 58300 INSERT INTRAUTERINE DEVICE: CPT | Performed by: PHYSICIAN ASSISTANT

## 2020-11-19 PROCEDURE — 87591 N.GONORRHOEAE DNA AMP PROB: CPT | Performed by: PHYSICIAN ASSISTANT

## 2020-11-19 ASSESSMENT — MIFFLIN-ST. JEOR: SCORE: 1526.2

## 2020-11-19 NOTE — PROGRESS NOTES
"  IUD Insertion:  CONSULT:    Is a pregnancy test required: No.  Was a consent obtained?  Yes    Subjective: Mora Maldonado is a 25 year old  presents for IUD and desires Mirena type IUD.    Patient has been given the opportunity to ask questions about all forms of birth control, including all options appropriate for Mora Maldonado. Discussed that no method of birth control, except abstinence is 100% effective against pregnancy or sexually transmitted infection.     Mora Maldonado understands she may have the IUD removed at any time. IUD should be removed by a health care provider.    The entire insertion procedure was reviewed with the patient, including care after placement.    No LMP recorded (lmp unknown). Has current contraception. No allergy to betadine or shellfish. Patient desires STD screening  HCG Qual Urine   Date Value Ref Range Status   2020 Negative NEG^Negative Final     Comment:     This test is for screening purposes.  Results should be interpreted along with   the clinical picture.  Confirmation testing is available if warranted by   ordering [id number], HCG Quantitative Pregnancy.           /74   Pulse 87   Temp 98.3  F (36.8  C) (Temporal)   Resp 18   Ht 1.58 m (5' 2.21\")   Wt 83.5 kg (184 lb)   LMP  (LMP Unknown)   SpO2 100%   Breastfeeding No   BMI 33.43 kg/m      Pelvic Exam:   EG/BUS: normal genital architecture without lesions, erythema or abnormal secretions.   Vagina: moist, pink, rugae with physiologic discharge and secretions  Cervix: nulliparous no lesions and pink, moist, closed, without lesion or CMT  Uterus: anterior position, mobile, no pain  Adnexa: within normal limits and no masses, nodularity, tenderness    PROCEDURE NOTE: -- IUD Insertion    Reason for Insertion: contraception    Premedicated with ibuprofen.  Under sterile technique, cervix was visualized with speculum and prepped with Betadine solution swab x 3. Tenaculum was placed for " stability. The uterus was gently straightened and sounded to 8.0 cm. IUD prepared for placement, and IUD inserted according to 's instructions without difficulty or significant resitance, and deployed at the fundus. The strings were visualized and trimmed to 3.0 cm from the external os. Tenaculum was removed and hemostasis noted. Speculum removed.  Patient tolerated procedure well.    Lot # [id number]    EBL: minimal    Complications: none    ASSESSMENT:     ICD-10-CM    1. Encounter for insertion of intrauterine contraceptive device  Z30.430 levonorgestrel (MIRENA) 20 MCG/24HR IUD     levonorgestrel (MIRENA) 20 MCG/24HR IUD 20 mcg     INSERTION INTRAUTERINE DEVICE        PLAN:    Given 's handouts, including when to have IUD removed, list of danger s/sx, side effects and follow up recommended. Encouraged condom use for prevention of STD. Back up contraception advised for 7 days if progestin method. Advised to call for any fever, for prolonged or severe pain or bleeding, abnormal vaginal discharge, or unable to palpate strings. She was advised to use pain medications (ibuprofen) as needed for mild to moderate pain. Advised to follow-up in clinic in 4-6 weeks for IUD string check if unable to find strings or as directed by provider.     Amrita Rose PA-C    Nexplanon Removal:     Is a pregnancy test required: No.  Was a consent obtained?  Yes    Mora Maldonado is here for removal of etonogestrel implant Nexplanon/Implanon    Indication: recurrent bleeding/IUD desired      Preoperative Diagnosis: etonogestrel implant  Postoperative Diagnosis: etonogestrel implant removed    Technique: On the left arm  Skin prep Betadine  Anesthesia 1% lidocaine  Procedure: Small incision (<5mm) was made at distal end of palpable implant, curved hemostat or mosquito forceps was used to isolate the implant and bring it to the incision, the fibrous capsule containing the implant  was incised and the Implant was  removed intact.    EBL: minimal  Complications:  No  Tolerance:  Pt tolerated procedure well and was in stable condition.   Dressing:    A pressure bandage was placed for the next 12-24 hours.    Contraception was discussed and patient chose the following method Mirena IUD      Follow up: Pt was instructed to call if bleeding, severe pain or foul smell.     Amrita Rose PA-C

## 2020-11-20 PROBLEM — Z97.5 IUD (INTRAUTERINE DEVICE) IN PLACE: Status: ACTIVE | Noted: 2020-11-20

## 2020-11-20 LAB
C TRACH DNA SPEC QL NAA+PROBE: NEGATIVE
N GONORRHOEA DNA SPEC QL NAA+PROBE: NEGATIVE
SPECIMEN SOURCE: NORMAL
SPECIMEN SOURCE: NORMAL

## 2020-11-21 ENCOUNTER — MYC MEDICAL ADVICE (OUTPATIENT)
Dept: FAMILY MEDICINE | Facility: OTHER | Age: 25
End: 2020-11-21

## 2020-11-24 ENCOUNTER — ANCILLARY PROCEDURE (OUTPATIENT)
Dept: ULTRASOUND IMAGING | Facility: CLINIC | Age: 25
End: 2020-11-24
Attending: PHYSICIAN ASSISTANT
Payer: COMMERCIAL

## 2020-11-24 DIAGNOSIS — Z87.42 HISTORY OF OVARIAN CYST: ICD-10-CM

## 2020-11-27 ENCOUNTER — HOSPITAL ENCOUNTER (EMERGENCY)
Facility: CLINIC | Age: 25
Discharge: HOME | End: 2020-11-27
Attending: PHYSICIAN ASSISTANT | Admitting: PHYSICIAN ASSISTANT
Payer: COMMERCIAL

## 2020-11-27 VITALS
BODY MASS INDEX: 32.7 KG/M2 | OXYGEN SATURATION: 97 % | TEMPERATURE: 97.3 F | HEART RATE: 95 BPM | WEIGHT: 180 LBS | SYSTOLIC BLOOD PRESSURE: 132 MMHG | RESPIRATION RATE: 16 BRPM | DIASTOLIC BLOOD PRESSURE: 81 MMHG

## 2020-11-27 DIAGNOSIS — N30.00 ACUTE CYSTITIS WITHOUT HEMATURIA: ICD-10-CM

## 2020-11-27 DIAGNOSIS — R30.0 DYSURIA: ICD-10-CM

## 2020-11-27 DIAGNOSIS — R82.90 ABNORMAL FINDING ON URINALYSIS: ICD-10-CM

## 2020-11-27 LAB
ALBUMIN UR-MCNC: 30 MG/DL
APPEARANCE UR: ABNORMAL
BACTERIA #/AREA URNS HPF: ABNORMAL /HPF
BILIRUB UR QL STRIP: NEGATIVE
COLOR UR AUTO: YELLOW
GLUCOSE UR STRIP-MCNC: NEGATIVE MG/DL
HCG UR QL: NEGATIVE
HGB UR QL STRIP: ABNORMAL
KETONES UR STRIP-MCNC: NEGATIVE MG/DL
LEUKOCYTE ESTERASE UR QL STRIP: ABNORMAL
NITRATE UR QL: NEGATIVE
PH UR STRIP: 5 PH (ref 5–7)
RBC #/AREA URNS AUTO: 98 /HPF (ref 0–2)
SOURCE: ABNORMAL
SP GR UR STRIP: 1.01 (ref 1–1.03)
UROBILINOGEN UR STRIP-MCNC: 0 MG/DL (ref 0–2)
WBC #/AREA URNS AUTO: >182 /HPF (ref 0–5)
WBC CLUMPS #/AREA URNS HPF: PRESENT /HPF

## 2020-11-27 PROCEDURE — 81001 URINALYSIS AUTO W/SCOPE: CPT | Performed by: PHYSICIAN ASSISTANT

## 2020-11-27 PROCEDURE — 99214 OFFICE O/P EST MOD 30 MIN: CPT | Performed by: PHYSICIAN ASSISTANT

## 2020-11-27 PROCEDURE — 87086 URINE CULTURE/COLONY COUNT: CPT | Performed by: PHYSICIAN ASSISTANT

## 2020-11-27 PROCEDURE — 87186 SC STD MICRODIL/AGAR DIL: CPT | Performed by: PHYSICIAN ASSISTANT

## 2020-11-27 PROCEDURE — 87088 URINE BACTERIA CULTURE: CPT | Performed by: PHYSICIAN ASSISTANT

## 2020-11-27 PROCEDURE — G0463 HOSPITAL OUTPT CLINIC VISIT: HCPCS | Performed by: PHYSICIAN ASSISTANT

## 2020-11-27 PROCEDURE — 81025 URINE PREGNANCY TEST: CPT | Performed by: PHYSICIAN ASSISTANT

## 2020-11-27 RX ORDER — CIPROFLOXACIN 500 MG/1
500 TABLET, FILM COATED ORAL 2 TIMES DAILY
Qty: 10 TABLET | Refills: 0 | Status: SHIPPED | OUTPATIENT
Start: 2020-11-27 | End: 2020-12-02

## 2020-11-27 ASSESSMENT — ENCOUNTER SYMPTOMS
FREQUENCY: 1
NAUSEA: 0
RECTAL PAIN: 0
HEADACHES: 0
DYSURIA: 1
VOMITING: 0
COLOR CHANGE: 0
BACK PAIN: 0
CHILLS: 0
CONFUSION: 0
DIFFICULTY URINATING: 0
ABDOMINAL PAIN: 1
FLANK PAIN: 0
FEVER: 0
HEMATURIA: 0
NECK STIFFNESS: 0
SHORTNESS OF BREATH: 0
EYE REDNESS: 0
ARTHRALGIAS: 0

## 2020-11-27 NOTE — DISCHARGE INSTRUCTIONS
Use Medication as directed    Patient advised to call for any lab results (if obtained during visit) within 2-3 days. Urine culture sent and pending.   Drink plenty of fluids.     Prevention and treatment of UTI's discussed.  Signs and symptoms of pyelonephritis mentioned. (fevers, back pain, nausea/vomiting or worsening/change in symptoms)   Follow up with primary care provider for recheck of urine in 1 weeks to confirm resolution of UTI.   Patient to return to clinic sooner if not improving as expected.   Go to ER if any worsening symptoms or signs/symptoms of phylonephritis occur.

## 2020-11-27 NOTE — ED AVS SNAPSHOT
[facility]  [address] [address]  Phone: [phone]  Fax: [phone]                                    Mora Maldnoado   MRN: [id number]    Department: [facility]   Date of Visit: 11/27/2020           After Visit Summary Signature Page    I have received my discharge instructions, and my questions have been answered. I have discussed any challenges I see with this plan with the nurse or doctor.    ..........................................................................................................................................  Patient/Patient Representative Signature      ..........................................................................................................................................  Patient Representative Print Name and Relationship to Patient    ..................................................               ................................................  Date                                   Time    ..........................................................................................................................................  Reviewed by Signature/Title    ...................................................              ..............................................  Date                                               Time          22EPIC Rev 08/18

## 2020-11-27 NOTE — ED PROVIDER NOTES
History     Chief Complaint   Patient presents with     Rule out Urinary Tract Infection     burning, frequency, bladder pressure     HPI    Mora Maldonado is a 25 year old female who  presents today with urinary symptoms. Symptoms of dysuria, urgency, frequency, burning and suprapubic pain and pressure have been going on for the past couple of weeks. Hematuria no.  gradual onset and still present and mild.  This patient does not have a history of urinary tract infections.   Vaginal symptoms had IUD placed a few days ago with normal pelvic exam at that time.   Patient states some mild vaginal discharge.     Problem list, Medication list, Allergies, and Medical/Social/Surgical histories reviewed in [facility] and updated as appropriate.        Allergies:  Allergies   Allergen Reactions     Amoxicillin Hives     Penicillins Hives       Problem List:    Patient Active Problem List    Diagnosis Date Noted     IUD (intrauterine device) in place - due for removal 11/2026 11/20/2020     Priority: Medium     Adjustment disorder with mixed anxiety and depressed mood 01/20/2020     Priority: Medium     Hypothyroidism, unspecified type 09/09/2019     Priority: Medium     Papanicolaou smear of cervix with low grade squamous intraepithelial lesion (LGSIL) 09/03/2019     Priority: Medium     2014 NIL pap. (Found in Care Everywhere).  9/3/19 LSIL pap. Plan colp.   9/20/19 [place] Bx & ECC - Negative. Plan cotest in 1 year.   9/3/20 Reminder letter/My Chart sent  10/16/20 [facility] clinic and schedule.  11/11/20 NIL Pap, Neg HR HPV. Plan: cotest in 3 years.            Past Medical History:    Past Medical History:   Diagnosis Date     Abnormal Pap smear of cervix 09/03/2019     Anxiety      Depressive disorder      Hypothyroidism      Thyroid disease        Past Surgical History:    Past Surgical History:   Procedure Laterality Date     LAPAROSCOPIC CHOLECYSTECTOMY N/A 5/15/2020    Procedure: laparoscopic cholecystectomy;  Surgeon: Rocky  Peter TANNER MD;  Location: [facility] OR       Family History:    Family History   Problem Relation Age of Onset     Rheumatoid Arthritis Mother      Thyroid Disease Maternal Grandmother      Hypertension Sister        Social History:  Marital Status:  Single [1]  Social History     Tobacco Use     Smoking status: Never Smoker     Smokeless tobacco: Never Used   Substance Use Topics     Alcohol use: Yes     Frequency: Monthly or less     Comment: once a month     Drug use: Never        Medications:         ciprofloxacin (CIPRO) 500 MG tablet       escitalopram (LEXAPRO) 20 MG tablet       etonogestrel (IMPLANON/NEXPLANON) 68 MG IMPL       levonorgestrel (MIRENA) 20 MCG/24HR IUD       levothyroxine (SYNTHROID/LEVOTHROID) 125 MCG tablet       phentermine (ADIPEX-P) 15 MG capsule          Review of Systems   Constitutional: Negative for chills and fever.   HENT: Negative for congestion.    Eyes: Negative for redness.   Respiratory: Negative for shortness of breath.    Cardiovascular: Negative for chest pain.   Gastrointestinal: Positive for abdominal pain. Negative for nausea, rectal pain and vomiting.   Genitourinary: Positive for dysuria, frequency, urgency and vaginal discharge. Negative for difficulty urinating, flank pain, hematuria and vaginal bleeding.   Musculoskeletal: Negative for arthralgias, back pain and neck stiffness.   Skin: Negative for color change.   Neurological: Negative for headaches.   Psychiatric/Behavioral: Negative for confusion.   All other systems reviewed and are negative.      Physical Exam   BP: 132/81  Pulse: 95  Temp: 97.3  F (36.3  C)  Resp: 16  Weight: 81.6 kg (180 lb)  SpO2: 97 %      Physical Exam     /81   Pulse 95   Temp 97.3  F (36.3  C) (Temporal)   Resp 16   Wt 81.6 kg (180 lb)   LMP  (LMP Unknown)   SpO2 97%   BMI 32.70 kg/m      GENERAL APPEARANCE: healthy, alert and no distress  RESP: lungs clear to auscultation - no rales, rhonchi or wheezes  CV: regular rates and rhythm,  normal S1 S2, no murmur noted  ABDOMEN:  soft, nontender, no HSM or masses and bowel sounds normal  BACK: No CVA tenderness  GU_female: discussed and offered pelvic exam, but after obtaining the UA results and further discussion patient will hold off on pelvic exam today and follow up if symptoms persist or change.   SKIN: no suspicious lesions or rashes    Results for orders placed or performed during the hospital encounter of 11/27/20 (from the past 24 hour(s))   UA reflex to Microscopic   Result Value Ref Range    Color Urine Yellow     Appearance Urine Cloudy     Glucose Urine Negative NEG^Negative mg/dL    Bilirubin Urine Negative NEG^Negative    Ketones Urine Negative NEG^Negative mg/dL    Specific Gravity Urine 1.015 1.003 - 1.035    Blood Urine Small (A) NEG^Negative    pH Urine 5.0 5.0 - 7.0 pH    Protein Albumin Urine 30 (A) NEG^Negative mg/dL    Urobilinogen mg/dL 0.0 0.0 - 2.0 mg/dL    Nitrite Urine Negative NEG^Negative    Leukocyte Esterase Urine Large (A) NEG^Negative    Source Midstream Urine     RBC Urine 98 (H) 0 - 2 /HPF    WBC Urine >182 (H) 0 - 5 /HPF    WBC Clumps Present (A) NEG^Negative /HPF    Bacteria Urine Few (A) NEG^Negative /HPF   HCG qualitative urine   Result Value Ref Range    HCG Qual Urine Negative NEG^Negative         ED Course        Procedures              Critical Care time:  none               Results for orders placed or performed during the hospital encounter of 11/27/20 (from the past 24 hour(s))   UA reflex to Microscopic   Result Value Ref Range    Color Urine Yellow     Appearance Urine Cloudy     Glucose Urine Negative NEG^Negative mg/dL    Bilirubin Urine Negative NEG^Negative    Ketones Urine Negative NEG^Negative mg/dL    Specific Gravity Urine 1.015 1.003 - 1.035    Blood Urine Small (A) NEG^Negative    pH Urine 5.0 5.0 - 7.0 pH    Protein Albumin Urine 30 (A) NEG^Negative mg/dL    Urobilinogen mg/dL 0.0 0.0 - 2.0 mg/dL    Nitrite Urine Negative NEG^Negative     Leukocyte Esterase Urine Large (A) NEG^Negative    Source Midstream Urine     RBC Urine 98 (H) 0 - 2 /HPF    WBC Urine >182 (H) 0 - 5 /HPF    WBC Clumps Present (A) NEG^Negative /HPF    Bacteria Urine Few (A) NEG^Negative /HPF   HCG qualitative urine   Result Value Ref Range    HCG Qual Urine Negative NEG^Negative   Urine Culture    Specimen: Urine clean catch; Midstream Urine   Result Value Ref Range    Specimen Description Midstream Urine     Special Requests Specimen received in preservative     Culture Micro PENDING        Medications - No data to display    Assessments & Plan (with Medical Decision Making)     I have reviewed the nursing notes.    I have reviewed the findings, diagnosis, plan and need for follow up with the patient.   25-year-old female presents today with urinary symptoms that started few weeks ago.  See exam findings above.  Patient had IUD placed recently with normal pelvic exam, wet prep and gonorrhea and Chlamydia test at the time.  Urine shows cloudy appearance with small blood, large leuk esterase, greater than 182 WBCs, WBC clumps present and few bacteria.  Urine pregnancy test was negative.  Urine culture sent and currently pending.  Offered and discussed pelvic exam with patient after UA results.  Patient decided that she would like to try the antibiotic first and will return if symptoms worsen or change or fail to improve.  Patient sent home with prescription Cipro 1 tablet twice daily for 5 days for treatment of urinary symptoms and abnormal findings on UA.  No concerns at this time for pyelonephritis or acute abdomen.  Patient discharged in stable condition.    Discharge Medication List as of 11/27/2020  2:31 PM      START taking these medications    Details   ciprofloxacin (CIPRO) 500 MG tablet Take 1 tablet (500 mg) by mouth 2 times daily for 5 days, Disp-10 tablet, R-0, E-Prescribe             Final diagnoses:   Acute cystitis without hematuria   Dysuria   Abnormal finding on  urinalysis       11/27/2020   [facility] EMERGENCY DEPT     Radha Holm PA-C  11/27/20 1959

## 2020-11-30 LAB
BACTERIA SPEC CULT: ABNORMAL
Lab: ABNORMAL
SPECIMEN SOURCE: ABNORMAL

## 2020-11-30 NOTE — RESULT ENCOUNTER NOTE
Final Urine Culture Report on 11/30/20  Emergency Dept/Urgent Care discharge antibiotic prescribed: Ciprofloxacin (Cipro) 500 mg tablet, 1 tablet (500 mg) by mouth 2 times daily for 5 days.  #1. Bacteria, 50,000 - 100,000 colonies/mL Staphylococcus Saprophyticus, is SUSCEPTIBLE to Antibiotic.    As per [facility] ED Lab Result protocol, no change in antibiotic therapy.

## 2020-12-01 ENCOUNTER — MYC MEDICAL ADVICE (OUTPATIENT)
Dept: FAMILY MEDICINE | Facility: OTHER | Age: 25
End: 2020-12-01

## 2020-12-08 ENCOUNTER — OFFICE VISIT (OUTPATIENT)
Dept: FAMILY MEDICINE | Facility: CLINIC | Age: 25
End: 2020-12-08
Payer: COMMERCIAL

## 2020-12-08 VITALS
TEMPERATURE: 98.2 F | HEART RATE: 102 BPM | OXYGEN SATURATION: 100 % | HEIGHT: 62 IN | DIASTOLIC BLOOD PRESSURE: 86 MMHG | WEIGHT: 184 LBS | RESPIRATION RATE: 18 BRPM | SYSTOLIC BLOOD PRESSURE: 124 MMHG | BODY MASS INDEX: 33.86 KG/M2

## 2020-12-08 DIAGNOSIS — R10.2 PELVIC CRAMPING: ICD-10-CM

## 2020-12-08 DIAGNOSIS — R21 RASH: Primary | ICD-10-CM

## 2020-12-08 PROCEDURE — 99213 OFFICE O/P EST LOW 20 MIN: CPT | Performed by: FAMILY MEDICINE

## 2020-12-08 RX ORDER — SULFAMETHOXAZOLE/TRIMETHOPRIM 800-160 MG
1 TABLET ORAL 2 TIMES DAILY
Qty: 20 TABLET | Refills: 0 | Status: SHIPPED | OUTPATIENT
Start: 2020-12-08 | End: 2020-12-18

## 2020-12-08 ASSESSMENT — PAIN SCALES - GENERAL: PAINLEVEL: NO PAIN (0)

## 2020-12-08 ASSESSMENT — MIFFLIN-ST. JEOR: SCORE: 1526.2

## 2020-12-08 NOTE — PROGRESS NOTES
"Subjective     Mora Maldonado is a 25 year old female who presents to clinic today for the following health issues:    HPI        Pelvic cramping since she got the IUD  Had a pelvic Ultrasound and IUD is in place  Onset/Duration:  4 Weeks ago  Description:   Character: Cramping  Location: pelvic region  Radiation: Back when laying down  Intensity: moderate  Progression of Symptoms:  worsening and constant  Accompanying Signs & Symptoms:  Fever/Chills: no  Gas/Bloating: YES bloating  Nausea: no  Vomitting: no  Diarrhea: no  Constipation: YES  Dysuria or Hematuria: no  History:   Trauma: no  Previous similar pain: no  Previous tests done: none  Precipitating factors:   Does the pain change with:     Food: no    Bowel Movement: no    Urination: no   Other factors:  Had IUD put 11/19/20    Therapies tried and outcome: None  Patient's last menstrual period was 11/19/2020. 11/19/20      Concern - skin patches  Onset: for long time and gotten worse this year  Description: red patches  Intensity: severe  Progression of Symptoms:  worsening  Accompanying Signs & Symptoms: itching, burning, bruising  Previous history of similar problem: yes but gotten worse  Precipitating factors:        Worsened by: when skin is exposed  Alleviating factors:        Improved by: using lotions  Therapies tried and outcome: oatmeal lotions, hemps helped little        Review of Systems   Rest of the ROS is Negative except see above and Problem list [stable]        Objective    /86   Pulse 102   Temp 98.2  F (36.8  C) (Oral)   Resp 18   Ht 1.58 m (5' 2.21\")   Wt 83.5 kg (184 lb)   LMP 11/19/2020   SpO2 100%   BMI 33.43 kg/m    Body mass index is 33.43 kg/m .  Physical Exam   GENERAL: healthy, alert and no distress  RESP: lungs clear to auscultation - no rales, rhonchi or wheezes  CV: regular rate and rhythm, normal S1 S2, no S3 or S4, no murmur, click or rub, no peripheral edema and peripheral pulses strong  ABDOMEN: soft, " nontender, no hepatosplenomegaly, no masses and bowel sounds normal   (female): deferred  Pt had a follow up pelvic ultrasound   PSYCH: mentation appears normal  Mild folliculitis arms and back            Assessment & Plan     Rash  Mild folliculitis  Continue with Hibeclens  - sulfamethoxazole-trimethoprim (BACTRIM DS) 800-160 MG tablet; Take 1 tablet by mouth 2 times daily for 10 days  SEE [facility] care orders  Follow up if not better  Pelvic cramping  Due to IUD  Pt declined HCG test  Advised use Ibuprofen otc  Follow up if not better/sooner if worse        Return in about 2 weeks (around 12/22/2020) for recheck/Please schedule appointment.    Aspen Finley MD  [facility]

## 2020-12-10 ENCOUNTER — TELEPHONE (OUTPATIENT)
Dept: FAMILY MEDICINE | Facility: OTHER | Age: 25
End: 2020-12-10

## 2020-12-14 ENCOUNTER — MYC MEDICAL ADVICE (OUTPATIENT)
Dept: FAMILY MEDICINE | Facility: CLINIC | Age: 25
End: 2020-12-14

## 2020-12-14 NOTE — TELEPHONE ENCOUNTER
Patient has been rescheduled for an office visit to follow up with Amrita Rose in regards to her anxiety. Her appointment is on Wednesday, December 23rd at 1:20 pm. Informed to message if this appointment does not work. Tali Love LPN

## 2020-12-16 NOTE — PROGRESS NOTES
"Subjective     Mora Maldonado is a 25 year old female who presents to clinic today for the following health issues:    HPI       Depression and Anxiety Follow-Up    How are you doing with your depression since your last visit? Improved- with exercise    How are you doing with your anxiety since your last visit?  Improved     Are you having other symptoms that might be associated with depression or anxiety? Yes:  sleeps too much    Have you had a significant life event? no     Do you have any concerns with your use of alcohol or other drugs? No     Patient presents today for mood follow-up. She reports that depression and anxiety was worsening but now that she can get back to the gym it feels things are settling down. She does still notice that she sleeps too much and has a hard time finding motivation. She is currently on Lexapro 20 mg daily. Not sure if she would like to do anything more at this time.    Would like to do blood testing for celiac. Has a lot of trouble with abdominal pain and bloating.     Social History     Tobacco Use     Smoking status: Never Smoker     Smokeless tobacco: Never Used   Substance Use Topics     Alcohol use: Yes     Frequency: Monthly or less     Comment: once a month     Drug use: Never     PHQ 11/11/2020 11/11/2020 12/23/2020   PHQ-9 Total Score 10 10 20   Q9: Thoughts of better off dead/self-harm past 2 weeks Not at all Not at all Several days     MOHINDER-7 SCORE 1/20/2020 4/10/2020 12/23/2020   Total Score 21 15 21     Review of Systems   Constitutional, HEENT, cardiovascular, pulmonary, GI, , musculoskeletal, neuro, skin, endocrine and psych systems are negative, except as otherwise noted.      Objective    /72   Pulse 88   Temp 98.4  F (36.9  C) (Temporal)   Resp 16   Ht 1.619 m (5' 3.75\")   Wt 81.6 kg (180 lb)   BMI 31.14 kg/m    Body mass index is 31.14 kg/m .  Physical Exam   GENERAL: healthy, alert and no distress  HENT: ear canals and TM's normal, nose and " mouth without ulcers or lesions  NECK: no adenopathy, no asymmetry, masses, or scars and thyroid normal to palpation  RESP: lungs clear to auscultation - no rales, rhonchi or wheezes  CV: regular rate and rhythm, normal S1 S2, no S3 or S4, no murmur, click or rub, no peripheral edema and peripheral pulses strong  ABDOMEN: soft, nontender, no hepatosplenomegaly, no masses and bowel sounds normal  SKIN: no suspicious lesions or rashes  PSYCH: mentation appears normal, affect normal/bright    No results found for this or any previous visit (from the past 24 hour(s)).        Assessment & Plan     Adjustment disorder with mixed anxiety and depressed mood  Patient notes increase depression/anxiety symptoms however has been improving somewhat with going back to the gym. She reports feeling more fatigued and decrease in motivation still an issue. She is on max dose of Lexapro. We discussed considering adding Wellbutrin at this time - patient in agreement. Appropriate use and side effects discussed. Patient will follow-up in clinic if new symptoms develop or current symptoms fail to improve.  - buPROPion (WELLBUTRIN XL) 150 MG 24 hr tablet; Take 1 tablet (150 mg) by mouth every morning    Abdominal bloating  Will order testing as below.   - Tissue transglutaminase armida IgA and IgG      The patient indicates understanding of these issues and agrees with the plan.    Amrita Rose PA-C  [facility]

## 2020-12-23 ENCOUNTER — OFFICE VISIT (OUTPATIENT)
Dept: FAMILY MEDICINE | Facility: CLINIC | Age: 25
End: 2020-12-23
Payer: COMMERCIAL

## 2020-12-23 VITALS
RESPIRATION RATE: 16 BRPM | DIASTOLIC BLOOD PRESSURE: 72 MMHG | WEIGHT: 180 LBS | SYSTOLIC BLOOD PRESSURE: 114 MMHG | TEMPERATURE: 98.4 F | HEART RATE: 88 BPM | BODY MASS INDEX: 30.73 KG/M2 | HEIGHT: 64 IN

## 2020-12-23 DIAGNOSIS — R14.0 ABDOMINAL BLOATING: ICD-10-CM

## 2020-12-23 DIAGNOSIS — F43.23 ADJUSTMENT DISORDER WITH MIXED ANXIETY AND DEPRESSED MOOD: Primary | ICD-10-CM

## 2020-12-23 PROCEDURE — 36415 COLL VENOUS BLD VENIPUNCTURE: CPT | Performed by: PHYSICIAN ASSISTANT

## 2020-12-23 PROCEDURE — 83516 IMMUNOASSAY NONANTIBODY: CPT | Performed by: PHYSICIAN ASSISTANT

## 2020-12-23 PROCEDURE — 96127 BRIEF EMOTIONAL/BEHAV ASSMT: CPT | Performed by: PHYSICIAN ASSISTANT

## 2020-12-23 PROCEDURE — 99214 OFFICE O/P EST MOD 30 MIN: CPT | Performed by: PHYSICIAN ASSISTANT

## 2020-12-23 RX ORDER — BUPROPION HYDROCHLORIDE 150 MG/1
150 TABLET ORAL EVERY MORNING
Qty: 90 TABLET | Refills: 0 | Status: SHIPPED | OUTPATIENT
Start: 2020-12-23 | End: 2021-02-10

## 2020-12-23 ASSESSMENT — ANXIETY QUESTIONNAIRES
GAD7 TOTAL SCORE: 21
7. FEELING AFRAID AS IF SOMETHING AWFUL MIGHT HAPPEN: NEARLY EVERY DAY
5. BEING SO RESTLESS THAT IT IS HARD TO SIT STILL: NEARLY EVERY DAY
1. FEELING NERVOUS, ANXIOUS, OR ON EDGE: NEARLY EVERY DAY
6. BECOMING EASILY ANNOYED OR IRRITABLE: NEARLY EVERY DAY
IF YOU CHECKED OFF ANY PROBLEMS ON THIS QUESTIONNAIRE, HOW DIFFICULT HAVE THESE PROBLEMS MADE IT FOR YOU TO DO YOUR WORK, TAKE CARE OF THINGS AT HOME, OR GET ALONG WITH OTHER PEOPLE: EXTREMELY DIFFICULT
2. NOT BEING ABLE TO STOP OR CONTROL WORRYING: NEARLY EVERY DAY
3. WORRYING TOO MUCH ABOUT DIFFERENT THINGS: NEARLY EVERY DAY

## 2020-12-23 ASSESSMENT — PATIENT HEALTH QUESTIONNAIRE - PHQ9
SUM OF ALL RESPONSES TO PHQ QUESTIONS 1-9: 20
5. POOR APPETITE OR OVEREATING: NEARLY EVERY DAY

## 2020-12-23 ASSESSMENT — MIFFLIN-ST. JEOR: SCORE: 1532.5

## 2020-12-24 ASSESSMENT — ANXIETY QUESTIONNAIRES: GAD7 TOTAL SCORE: 21

## 2020-12-28 ENCOUNTER — NURSE TRIAGE (OUTPATIENT)
Dept: NURSING | Facility: CLINIC | Age: 25
End: 2020-12-28

## 2020-12-28 LAB
TTG IGA SER-ACNC: <1 U/ML
TTG IGG SER-ACNC: 1 U/ML

## 2020-12-29 ENCOUNTER — PATIENT OUTREACH (OUTPATIENT)
Dept: CARE COORDINATION | Facility: CLINIC | Age: 25
End: 2020-12-29

## 2020-12-29 DIAGNOSIS — T88.7XXA MEDICATION SIDE EFFECTS: Primary | ICD-10-CM

## 2020-12-29 NOTE — LETTER
[facility] CARE COORDINATION  [address] [facility] [address]    December 30, 2020    Mora Maldonado  [address] [facility] [address]      Dear Mora,    I am a clinic community health worker who works with Brad Garcia MD at [facility]. I have been trying to reach you recently to introduce Clinic Care Coordination and to see if there was anything I could assist you with.  Below is a description of clinic care coordination and how I can further assist you.      The clinic care coordination team is made up of a registered nurse,  and community health worker who understand the health care system. The goal of clinic care coordination is to help you manage your health and improve access to the health care system in the most efficient manner. The team can assist you in meeting your health care goals by providing education, coordinating services, strengthening the communication among your providers and supporting you with any resource needs.    Please feel free to contact me at [phone] with any questions or concerns. We are focused on providing you with the highest-quality healthcare experience possible and that all starts with you.     Sincerely,     Isabella ADAMS Community Health Worker  Clinic Care Coordination  [facility] : [place], [place] & [place]  Phone: [phone]

## 2020-12-29 NOTE — PROGRESS NOTES
Clinic Care Coordination Contact  [facility]/Voicemail       Clinical Data: CHW Outreach  Outreach attempted x 1. No voicemail message states patient is unavailable.    Plan: CHW will try to reach patient again in 1-2 business days.    Isabella ADAMS Community Health Worker  Clinic Care Coordination  [facility] : [place], [place] & [place]  Phone: [phone]    Reason for Referral: Care Transition: ED to outpatient  Additional pertinent details: Medication side effect

## 2020-12-29 NOTE — TELEPHONE ENCOUNTER
Patient calling reporting having palpitations. States she feels dizzy but able to ambulate. Reports feeling short of breathe. Per guideline, advised patient to be seen at the emergency department. Caller verbalized understanding. Denies further questions.      Karlo Anderson RN  [facility]     COVID 19 Nurse Triage Plan/Patient Instructions    Please be aware that novel coronavirus (COVID-19) may be circulating in the community. If you develop symptoms such as fever, cough, or SOB or if you have concerns about the presence of another infection including coronavirus (COVID-19), please contact your health care provider or visit [email or web address].     Disposition/Instructions    ED Visit recommended. Follow protocol based instructions.     Bring Your Own Device:  Please also bring your smart device(s) (smart phones, tablets, laptops) and their charging cables for your personal use and to communicate with your care team during your visit.    Thank you for taking steps to prevent the spread of this virus.  o Limit your contact with others.  o Wear a simple mask to cover your cough.  o Wash your hands well and often.    Resources    [facility]: About COVID-19: [email or web address]    CDC: What to Do If You're Sick: www.cdc.gov/coronavirus/2019-ncov/about/steps-when-sick.html    CDC: Ending Home Isolation: www.cdc.gov/coronavirus/2019-ncov/hcp/disposition-in-home-patients.html     CDC: Caring for Someone: www.cdc.gov/coronavirus/2019-ncov/if-you-are-sick/care-for-someone.html     [facility]: Interim Guidance for Hospital Discharge to Home: www.health.[facility].mn.us/diseases/coronavirus/hcp/hospdischarge.pdf    [facility] clinical trials (COVID-19 research studies): [email or web address]/um-clinical-trials     Below are the COVID-19 hotlines at the [facility] ([facility]). Interpreters are available.   o For health questions: Call 438-977-4597 or 1-663.434.6790 (7 a.m.  to 7 p.m.)  o For questions about schools and childcare: Call 606-272-5421 or 1-383.775.1963 (7 a.m. to 7 p.m.)                       Reason for Disposition    Difficulty breathing    Additional Information    Negative: Passed out (i.e., lost consciousness, collapsed and was not responding)    Negative: Shock suspected (e.g., cold/pale/clammy skin, too weak to stand, low BP, rapid pulse)    Negative: Difficult to awaken or acting confused (e.g., disoriented, slurred speech)    Negative: Visible sweat on face or sweat dripping down face    Negative: Unable to walk, or can only walk with assistance (e.g., requires support)    Negative: [1] Received SHOCK from implantable cardiac defibrillator AND [2] persisting symptoms (i.e., palpitations, lightheadedness)    Negative: Sounds like a life-threatening emergency to the triager    Protocols used: HEART RATE AND HEARTBEAT [id number]

## 2020-12-30 NOTE — PROGRESS NOTES
Clinic Care Coordination Contact  [facility]/Voicemail       Clinical Data: CHW Outreach  Outreach attempted x 2. No answer, message came on stating patient was unavailable please try you call again later.    Plan: CHW will send care coordination introduction letter with care coordinator contact information and explanation of care coordination services via [employer].     CHW will do no further outreaches at this time.    Isabella ADAMS Community Health Worker  Clinic Care Coordination  [facility] : [place], [place] & [place]  Phone: [phone]    Reason for Referral: Care Transition: ED to outpatient  Additional pertinent details: Medication side effect

## 2021-02-07 ENCOUNTER — MYC MEDICAL ADVICE (OUTPATIENT)
Dept: FAMILY MEDICINE | Facility: CLINIC | Age: 26
End: 2021-02-07

## 2021-02-07 DIAGNOSIS — L50.9 HIVES: Primary | ICD-10-CM

## 2021-02-14 ENCOUNTER — MYC MEDICAL ADVICE (OUTPATIENT)
Dept: FAMILY MEDICINE | Facility: CLINIC | Age: 26
End: 2021-02-14

## 2021-02-22 ENCOUNTER — OFFICE VISIT (OUTPATIENT)
Dept: OPTOMETRY | Facility: CLINIC | Age: 26
End: 2021-02-22
Payer: COMMERCIAL

## 2021-02-22 DIAGNOSIS — H52.221 REGULAR ASTIGMATISM OF RIGHT EYE: ICD-10-CM

## 2021-02-22 DIAGNOSIS — H04.123 DRY EYES: ICD-10-CM

## 2021-02-22 DIAGNOSIS — H52.13 MYOPIA OF BOTH EYES: ICD-10-CM

## 2021-02-22 DIAGNOSIS — H53.143 ASTHENOPIA OF BOTH EYES: Primary | ICD-10-CM

## 2021-02-22 PROCEDURE — 99203 OFFICE O/P NEW LOW 30 MIN: CPT | Performed by: OPTOMETRIST

## 2021-02-22 ASSESSMENT — REFRACTION_MANIFEST
OD_CYLINDER: +0.50
OS_CYLINDER: SPHERE
OS_SPHERE: -0.75
OD_AXIS: 122
OD_SPHERE: -1.00

## 2021-02-22 ASSESSMENT — VISUAL ACUITY
METHOD: SNELLEN - LINEAR
OS_SC: 20/25-
OD_SC: 20/30
OD_SC+: +

## 2021-02-22 ASSESSMENT — CUP TO DISC RATIO
OS_RATIO: 0.3
OD_RATIO: 0.2

## 2021-02-22 ASSESSMENT — SLIT LAMP EXAM - LIDS
COMMENTS: NORMAL, FULL CLOSURE WITH BLINK
COMMENTS: NORMAL, FULL CLOSURE WITH BLINK

## 2021-02-22 ASSESSMENT — EXTERNAL EXAM - RIGHT EYE: OD_EXAM: NORMAL

## 2021-02-22 ASSESSMENT — EXTERNAL EXAM - LEFT EYE: OS_EXAM: NORMAL

## 2021-02-22 ASSESSMENT — CONF VISUAL FIELD
OS_NORMAL: 1
OD_NORMAL: 1

## 2021-02-22 ASSESSMENT — TONOMETRY
OS_IOP_MMHG: 13
OD_IOP_MMHG: 13
IOP_METHOD: APPLANATION

## 2021-02-22 NOTE — PATIENT INSTRUCTIONS
DRY EYE TREATMENT    I recommend using artificial tears for your dry eye. There are over the counter drops that work well and may be used up to 4x daily. (Systane Balance, Refresh Optive, Soothe XP)   If you need more than 4 drops daily, use a preservative free product which come in individual vials which may be used for 24 hours and discarded.     Artificial tears work best as a preventative and not as well after your eyes are starting to bother you.  It may take 4- 6 weeks of using the drops before you notice improvement.  If after that time you are still having problems schedule an appointment for an evaluation and discussion of different treatments.  Dry eyes are a chronic condition and you may have more symptoms at certain times of the year.      Additional recommended treatment:  Warm compresses once to twice daily for 5-10 minutes    Directions for warm soaks  There are few methods for hot compresses. Moisten a washcloth with hot water, or microwave for 10 seconds, being careful to not get the cloth too hot.   Then put the washcloth onto your eyelids for 5 minutes. It will cool quickly so a rice pack or eyemask that can be heated and laid on top of the washcloth will help retain the heat.      Mora was advised of today's exam findings.  Fill glasses prescription  Allow 2 weeks to adapt to the new glasses.   Copy of glasses Rx provided today.    Return to clinic as needed.      Gildardo Reynolds O.D.  [address]  [place]  [address]    [phone]

## 2021-02-22 NOTE — PROGRESS NOTES
Chief Complaint   Patient presents with     Eye Pain     Chief Complaint         Eye Pain   HPI    Eye Pain      Laterality:  Behind eyes     Quality:  pain with eye movement, pressure, burning, and irritation     Pain scale:  5/10     Frequency:  intermittently     Duration:  1 week     Course:  gradually worsening     Associated symptoms:  blurred vision, photophobia, redness, and migraines     Treatments tried:  eye drops     Response to treatment:  no improvement   Comments      Patient tried visine extra moisturizing drops and did not help. Has to squint with any kind of light,snow, sun,headlights. Patient does not wear contacts.           Medical, surgical and family histories reviewed and updated 2/22/2021.       OBJECTIVE: See Ophthalmology exam    ASSESSMENT:    ICD-10-CM    1. Asthenopia of both eyes  H53.143    2. Dry eyes  H04.123    3. Myopia of both eyes  H52.13    4. Regular astigmatism of right eye  H52.221       PLAN:     Patient Instructions    DRY EYE TREATMENT    I recommend using artificial tears for your dry eye. There are over the counter drops that work well and may be used up to 4x daily. (Systane Balance, Refresh Optive, Soothe XP)   If you need more than 4 drops daily, use a preservative free product which come in individual vials which may be used for 24 hours and discarded.     Artificial tears work best as a preventative and not as well after your eyes are starting to bother you.  It may take 4- 6 weeks of using the drops before you notice improvement.  If after that time you are still having problems schedule an appointment for an evaluation and discussion of different treatments.  Dry eyes are a chronic condition and you may have more symptoms at certain times of the year.      Additional recommended treatment:  Warm compresses once to twice daily for 5-10 minutes    Directions for warm soaks  There are few methods for hot compresses. Moisten a washcloth with hot water, or microwave  for 10 seconds, being careful to not get the cloth too hot.   Then put the washcloth onto your eyelids for 5 minutes. It will cool quickly so a rice pack or eyemask that can be heated and laid on top of the washcloth will help retain the heat.      Mora was advised of today's exam findings.  Fill glasses prescription  Allow 2 weeks to adapt to the new glasses.   Copy of glasses Rx provided today.    Return to clinic as needed.      Gildardo Reynolds O.D.  [address] [place]  [address]    [phone]

## 2021-02-22 NOTE — LETTER
2/22/2021         RE: Mora Maldonado  [address] [facility] [address]        Dear Colleague,    Thank you for referring your patient, Mora Maldonado, to the [facility]. Please see a copy of my visit note below.    Chief Complaint   Patient presents with     Eye Pain     Chief Complaint         Eye Pain   HPI    Eye Pain      Laterality:  Behind eyes     Quality:  pain with eye movement, pressure, burning, and irritation     Pain scale:  5/10     Frequency:  intermittently     Duration:  1 week     Course:  gradually worsening     Associated symptoms:  blurred vision, photophobia, redness, and migraines     Treatments tried:  eye drops     Response to treatment:  no improvement   Comments      Patient tried visine extra moisturizing drops and did not help. Has to squint with any kind of light,snow, sun,headlights. Patient does not wear contacts.           Medical, surgical and family histories reviewed and updated 2/22/2021.       OBJECTIVE: See Ophthalmology exam    ASSESSMENT:    ICD-10-CM    1. Asthenopia of both eyes  H53.143    2. Dry eyes  H04.123    3. Myopia of both eyes  H52.13    4. Regular astigmatism of right eye  H52.221       PLAN:     Patient Instructions    DRY EYE TREATMENT    I recommend using artificial tears for your dry eye. There are over the counter drops that work well and may be used up to 4x daily. (Systane Balance, Refresh Optive, Soothe XP)   If you need more than 4 drops daily, use a preservative free product which come in individual vials which may be used for 24 hours and discarded.     Artificial tears work best as a preventative and not as well after your eyes are starting to bother you.  It may take 4- 6 weeks of using the drops before you notice improvement.  If after that time you are still having problems schedule an appointment for an evaluation and discussion of different treatments.  Dry eyes are a chronic condition and you may have more  symptoms at certain times of the year.      Additional recommended treatment:  Warm compresses once to twice daily for 5-10 minutes    Directions for warm soaks  There are few methods for hot compresses. Moisten a washcloth with hot water, or microwave for 10 seconds, being careful to not get the cloth too hot.   Then put the washcloth onto your eyelids for 5 minutes. It will cool quickly so a rice pack or eyemask that can be heated and laid on top of the washcloth will help retain the heat.      Mora was advised of today's exam findings.  Fill glasses prescription  Allow 2 weeks to adapt to the new glasses.   Copy of glasses Rx provided today.    Return to clinic as needed.      Gildardo Reynolds O.D.  [address] [place]  [address] [phone]             Again, thank you for allowing me to participate in the care of your patient.        Sincerely,        Gildardo Reynolds OD

## 2021-03-04 ENCOUNTER — APPOINTMENT (OUTPATIENT)
Dept: GENERAL RADIOLOGY | Facility: CLINIC | Age: 26
End: 2021-03-04
Attending: EMERGENCY MEDICINE
Payer: COMMERCIAL

## 2021-03-04 ENCOUNTER — APPOINTMENT (OUTPATIENT)
Dept: CT IMAGING | Facility: CLINIC | Age: 26
End: 2021-03-04
Attending: EMERGENCY MEDICINE
Payer: COMMERCIAL

## 2021-03-04 ENCOUNTER — HOSPITAL ENCOUNTER (EMERGENCY)
Facility: CLINIC | Age: 26
Discharge: HOME | End: 2021-03-04
Attending: EMERGENCY MEDICINE | Admitting: EMERGENCY MEDICINE
Payer: COMMERCIAL

## 2021-03-04 VITALS
TEMPERATURE: 98.5 F | SYSTOLIC BLOOD PRESSURE: 118 MMHG | HEART RATE: 108 BPM | RESPIRATION RATE: 20 BRPM | DIASTOLIC BLOOD PRESSURE: 85 MMHG | WEIGHT: 166 LBS | BODY MASS INDEX: 28.72 KG/M2 | OXYGEN SATURATION: 100 %
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
## 2021-03-04 DIAGNOSIS — S09.90XA INJURY OF HEAD, INITIAL ENCOUNTER: ICD-10-CM

## 2021-03-04 DIAGNOSIS — S60.819A WRIST ABRASION, NON-INFECTED: ICD-10-CM

## 2021-03-04 PROCEDURE — 70450 CT HEAD/BRAIN W/O DYE: CPT

## 2021-03-04 PROCEDURE — 99284 EMERGENCY DEPT VISIT MOD MDM: CPT | Performed by: EMERGENCY MEDICINE

## 2021-03-04 PROCEDURE — 73110 X-RAY EXAM OF WRIST: CPT | Mod: RT

## 2021-03-04 PROCEDURE — 29125 APPL SHORT ARM SPLINT STATIC: CPT | Mod: RT | Performed by: EMERGENCY MEDICINE

## 2021-03-04 PROCEDURE — 99283 EMERGENCY DEPT VISIT LOW MDM: CPT | Performed by: EMERGENCY MEDICINE
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
## 2021-03-05 NOTE — ED NOTES
Patient is back from CT. She states her right wrist pain is now 10/10. Waiting for family to arrive.

## 2021-03-05 NOTE — DISCHARGE INSTRUCTIONS
Return to the ER if new or worsening symptoms. Use splint for comfort.  Your xray and ct scan were without acute findings.  I hope you get better quickly.
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
## 2021-04-13 DIAGNOSIS — E66.811 CLASS 1 OBESITY WITHOUT SERIOUS COMORBIDITY IN ADULT, UNSPECIFIED BMI, UNSPECIFIED OBESITY TYPE: ICD-10-CM

## 2021-04-13 DIAGNOSIS — E03.9 HYPOTHYROIDISM, UNSPECIFIED TYPE: ICD-10-CM

## 2021-04-13 PROCEDURE — 84439 ASSAY OF FREE THYROXINE: CPT | Performed by: PHYSICIAN ASSISTANT

## 2021-04-13 PROCEDURE — 84443 ASSAY THYROID STIM HORMONE: CPT | Performed by: PHYSICIAN ASSISTANT

## 2021-04-13 PROCEDURE — 80048 BASIC METABOLIC PNL TOTAL CA: CPT | Performed by: PHYSICIAN ASSISTANT

## 2021-04-13 PROCEDURE — 36415 COLL VENOUS BLD VENIPUNCTURE: CPT | Performed by: PHYSICIAN ASSISTANT

## 2021-04-14 DIAGNOSIS — E03.9 HYPOTHYROIDISM, UNSPECIFIED TYPE: Primary | ICD-10-CM

## 2021-04-14 LAB
ANION GAP SERPL CALCULATED.3IONS-SCNC: 8 MMOL/L (ref 3–14)
BUN SERPL-MCNC: 14 MG/DL (ref 7–30)
CALCIUM SERPL-MCNC: 9.2 MG/DL (ref 8.5–10.1)
CHLORIDE SERPL-SCNC: 104 MMOL/L (ref 94–109)
CO2 SERPL-SCNC: 23 MMOL/L (ref 20–32)
CREAT SERPL-MCNC: 0.9 MG/DL (ref 0.52–1.04)
GFR SERPL CREATININE-BSD FRML MDRD: 87 ML/MIN/{1.73_M2}
GLUCOSE SERPL-MCNC: 78 MG/DL (ref 70–99)
POTASSIUM SERPL-SCNC: 4.3 MMOL/L (ref 3.4–5.3)
SODIUM SERPL-SCNC: 135 MMOL/L (ref 133–144)
T4 FREE SERPL-MCNC: 1.57 NG/DL (ref 0.76–1.46)
TSH SERPL DL<=0.005 MIU/L-ACNC: 0.12 MU/L (ref 0.4–4)

## 2021-04-14 RX ORDER — LEVOTHYROXINE SODIUM 112 UG/1
112 TABLET ORAL DAILY
Qty: 90 TABLET | Refills: 1 | Status: SHIPPED | OUTPATIENT
Start: 2021-04-14 | End: 2021-11-18

## 2021-05-10 DIAGNOSIS — F43.23 ADJUSTMENT DISORDER WITH MIXED ANXIETY AND DEPRESSED MOOD: ICD-10-CM

## 2021-05-10 RX ORDER — BUPROPION HYDROCHLORIDE 150 MG/1
150 TABLET ORAL EVERY MORNING
Qty: 30 TABLET | Refills: 0 | Status: SHIPPED | OUTPATIENT
Start: 2021-05-10 | End: 2021-06-04

## 2021-05-10 NOTE — TELEPHONE ENCOUNTER
"Routing refill request to provider for review/approval because:  PHQ9 score out of range  T'd up 1 month for provider review.    Requested Prescriptions   Pending Prescriptions Disp Refills     buPROPion (WELLBUTRIN XL) 150 MG 24 hr tablet 90 tablet 0     Sig: Take 1 tablet (150 mg) by mouth every morning   Last Written Prescription Date:  2/10/2021  Last Fill Quantity: 90,  # refills: 0   Last office visit: 12/23/2020 with prescribing provider:     Future Office Visit:        SSRIs Protocol Failed - 5/10/2021  9:15 AM        Failed - PHQ-9 score less than 5 in past 6 months     Please review last PHQ-9 score.   PHQ-9 score:    PHQ 12/23/2020   PHQ-9 Total Score 20   Q9: Thoughts of better off dead/self-harm past 2 weeks Several days             Passed - Medication is Bupropion     If the medication is Bupropion (Wellbutrin), and the patient is taking for smoking cessation; OK to refill.          Passed - Medication is active on med list        Passed - Patient is age 18 or older        Passed - No active pregnancy on record        Passed - No positive pregnancy test in last 12 months        Passed - Recent (6 mo) or future (30 days) visit within the authorizing provider's specialty     Patient had office visit in the last 6 months or has a visit in the next 30 days with authorizing provider or within the authorizing provider's specialty.  See \"Patient Info\" tab in inbasket, or \"Choose Columns\" in Meds & Orders section of the refill encounter.             Becki Gaming RN      "

## 2021-05-17 NOTE — TELEPHONE ENCOUNTER
REFERRAL INFORMATION:    Referring Provider:  Amrita Rose PA-C    Referring Clinic:  [facility]    Reason for Visit/Diagnosis: recurrent abdominal pain     FUTURE VISIT INFORMATION:    Appointment Date: 5/18/21    Appointment Time:  1:00pm     NOTES STATUS DETAILS   OFFICE NOTE from Referring Provider Epic Amrita Rose PA-C: 12/23/20   OFFICE NOTE from Other Specialist [facility] Optometry: 2/22/21 - Dr. Gildardo Reynolds  Endo: 9/8/20 - Dr. Stephan Robert  Podiatry: 8/26/20 - Dr. Peter Mcmahan  Surgery: 7/3/20 - Dr. Peter Hidalgo  OBGYN: 9/20/19 - Dr. Garcia beh     [facility] DISCHARGE SUMMARY/  ED VISITS Epic Admission:  5/15/20    ED:  11/27/20, 5/13/20    UC:  8/8/20, 7/31/20   OPERATIVE REPORT Epic 5/15/20: Laparoscopic Cholecystectomy   MEDICATION LIST Epic 5/10/21        ENDOSCOPY  NA    COLONOSCOPY NA    ERCP NA    EUS NA    STOOL TESTING NA    PERTINENT LABS Epic 4/13/21: TSH     PATHOLOGY REPORTS (RELATED) Epic 5/15/20: Surg Path     IMAGING (CT, MRI, EGD, MRCP, Small Bowel Follow Through/SBT, MR/CT Enterography) PACS, Reports in Epic 3/4/21: CT Head  11/24/20: US Pelvic  5/19/20: CT Chest  5/5/20: CT Abd Pelvis  5/5/20: US Abd     Action    Action Taken 5/17/21:    -LVM for pt re: recs call.  8:21 AM    -Pt called back, confirmed no outside records  9:58 AM

## 2021-05-18 ENCOUNTER — VIRTUAL VISIT (OUTPATIENT)
Dept: GASTROENTEROLOGY | Facility: CLINIC | Age: 26
End: 2021-05-18
Attending: PHYSICIAN ASSISTANT
Payer: COMMERCIAL

## 2021-05-18 ENCOUNTER — PRE VISIT (OUTPATIENT)
Dept: GASTROENTEROLOGY | Facility: CLINIC | Age: 26
End: 2021-05-18

## 2021-05-18 VITALS — BODY MASS INDEX: 28.52 KG/M2 | WEIGHT: 155 LBS | HEIGHT: 62 IN

## 2021-05-18 DIAGNOSIS — R10.9 RECURRENT ABDOMINAL PAIN: ICD-10-CM

## 2021-05-18 DIAGNOSIS — R11.0 POSTPRANDIAL NAUSEA: ICD-10-CM

## 2021-05-18 DIAGNOSIS — R13.10 DYSPHAGIA, UNSPECIFIED TYPE: Primary | ICD-10-CM

## 2021-05-18 DIAGNOSIS — K21.9 GASTROESOPHAGEAL REFLUX DISEASE, UNSPECIFIED WHETHER ESOPHAGITIS PRESENT: ICD-10-CM

## 2021-05-18 PROCEDURE — 99204 OFFICE O/P NEW MOD 45 MIN: CPT | Mod: GT | Performed by: PHYSICIAN ASSISTANT

## 2021-05-18 ASSESSMENT — PAIN SCALES - GENERAL: PAINLEVEL: NO PAIN (0)

## 2021-05-18 ASSESSMENT — MIFFLIN-ST. JEOR: SCORE: 1386.33

## 2021-05-18 NOTE — PATIENT INSTRUCTIONS
It was a pleasure taking care of you today.  I've included a brief summary of our discussion and care plan from today's visit below.  Please review this information with your primary care provider.  _______________________________________________________________________    My recommendations are summarized as follows:    -- Reintroduce gluten for at least 2 weeks, until upper endoscopy is performed. After 2 weeks, plan to get blood work for celiac disease, as well as other basic lab tests.  -- Colonoscopy and upper endoscopy. If you have not heard from endoscopy within 2-3 days, please call [phone] to schedule.   -- In the meantime, lets plan to work on the constipation and monitor for any improvement of your GI symptoms.    Goal: Improvement in stool frequency to reduce occurrence of dry/hard stools, straining, bloating, and abdominal discomfort, and hopefully the nausea, heartburn symptoms as well.    Daily constipation plan:  -- Miralax: Miralax 1 capful daily, titrating to goal, up to 3 capfuls daily. If you have not had a bowel movement for 2-3 days, or if you feel that you straining, incompletely evacuating, plan to augment your daily plan by increasing Miralax dose, up to 3 capfuls daily, until stooling as resumed, and then return to previous dosing.  -- Increase dietary fiber as able to tolerate, with goal 20-30 grams daily. Prunes can be a great source of fiber and can be used daily to help with bowel movements. If certain foods are difficult for you to tolerate, consider meeting with our nutrition team to help you.  -- Fiber: After using Miralax for about 2 weeks, you can add in soluble fiber supplement (such as Citrucel, Metamucil, psyllium husk). Start with 1/2 tablespoon in 8 oz water daily, increase slowly to effect. A good goal is 1 tablespoon daily, with a maximum of 3 tablespoons daily. This helps with stool consistency.  - Try to stay active with at least 150 minutes of moderate intensity  activity per week. This can include brisk walking, active gardening, cycling, yoga, pilates, etc.   - Try to stay hydrated, goal 48-64 oz of non-caffeinated fluid daily    Constipation back-up plan: If you have not had a bowel movement for 2-3 days, or if you feel that you straining, incompletely evacuating, plan to augment your daily plan by:  - Miralax: Increasing Miralax to up to 3 capfuls daily, returning to daily maintenance dose above, once stools have resumed  - Magnesium: Add in over-the-counter magnesium oxide 400 mg once to twice daily  - Products containing senna (ie Senakot, SmoothMove tea) can be used over a short period of time, but please be mindful that your bowels can develop a dependence to senna products, meaning your bowels will rely on Senna to produce a bowel movement.    Emergency back-up plan: If you continue to feel constipated and need more help:  - Purchase a bottle of magnesium citrate over the counter. Drink 1/2 to full bottle.  - If this is not effective, please call us.        Return to GI Clinic in 6-8 weeks to review your progress.    ______________________________________________________________________    How do I schedule labs, imaging studies, or procedures that were ordered in clinic today?     Labs: To schedule lab appointment at the [facility], use my chart or call [phone]. If you have a [facility] lab closer to home where you are regularly seen you can give them a call.     Procedures: If a colonoscopy, upper endoscopy, breath test, esophageal manometry, or pH impedence was ordered today, our endoscopy team will call you to schedule this. If you have not heard from our endoscopy team within a week, please call [phone] to schedule.     Imaging Studies: If you were scheduled for a CT scan, X-ray, MRI, ultrasound, HIDA scan or other imaging study, please call [phone] to have this scheduled.     Referral: If a referral to another specialty was  ordered, expect a phone call or follow instructions above. If you have not heard from anyone regarding your referral in a week, please call our clinic to check the status.     Who do I call with any questions after my visit?  Please be in touch if there are any further questions that arise following today's visit.  There are multiple ways to contact your gastroenterology care team.        During business hours, you may reach a Gastroenterology nurse at [phone]      To schedule or reschedule an appointment, please call [phone].       You can always send a secure message through Fritter.  Fritter messages are answered by your nurse or doctor typically within 24 hours.  Please allow extra time on weekends and holidays.        For urgent/emergent questions after business hours, you may reach the on-call GI Fellow by contacting the [facility]  at [phone].     How will I get the results of any tests ordered?    You will receive all of your results.  If you have signed up for [employer], any tests ordered at your visit will be available to you after your physician reviews them.  Typically this takes 1-2 weeks.  If there are urgent results that require a change in your care plan, your physician or nurse will call you to discuss the next steps.      What is Fritter?  Fritter is a secure way for you to access all of your healthcare records from the [facility].  It is a web based computer program, so you can sign on to it from any location.  It also allows you to send secure messages to your care team.  I recommend signing up for Fritter access if you have not already done so and are comfortable with using a computer.      How to I schedule a follow-up visit?  If you did not schedule a follow-up visit today, please call [phone] to schedule a follow-up office visit.      Sincerely,    Belen Morales PA-C  Division of Gastroenterology, Hepatology & Nutrition  [facility]  [place]

## 2021-05-18 NOTE — NURSING NOTE
"Chief Complaint   Patient presents with     New Patient     New patient consult for abdominal pain. - Video visit.        Vitals:    05/18/21 1232   Weight: 70.3 kg (155 lb)   Height: 1.575 m (5' 2\")       Body mass index is 28.35 kg/m .    Najma Carcamo LPN      "

## 2021-05-18 NOTE — LETTER
"    5/18/2021         RE: Mora Maldonado  [id number] [facility] [address]        Dear Colleague,    Thank you for referring your patient, Mora Maldonado, to the [facility] [place]. Please see a copy of my visit note below.    Mora Maldonado is a 26 year old female who is being evaluated via a billable video visit.      The patient has been notified of following:     \"This video visit will be conducted via a call between you and your physician/provider. We have found that certain health care needs can be provided without the need for an in-person physical exam.  This service lets us provide the care you need with a video conversation.  If a prescription is necessary we can send it directly to your pharmacy.  If lab work is needed we can place an order for that and you can then stop by our lab to have the test done at a later time.    If during the course of the call the physician/provider feels a video visit is not appropriate, you will not be charged for this service.\"     Patient confirmed that they are in [place] for today's visit YES.    Video-Visit Details  Type of service:  Video Visit    Video Start Time: 102  Video End Time:  140    Originating Location (pt. Location): Home    Distant Location (provider location):  [facility]     Platform used: [place]              GI CLINIC VISIT    CC/REFERRING PROVIDER: Amrita Rose  REASON FOR CONSULTATION: abdominal pain    HPI: 26 year old female with PMH of anxiety, depression, hypothyroidism who is s/p cholecystectomy (2020) presenting to GI clinic for abdominal pain and other symptoms.    Presented with RUQ pain in May 2020, with RUQ US showing cholelithiasis without sonographic evidence of acute cholecystitis. Same day CT AP was unremarkable. She underwent CCY for this.     She questions if she has celiac disease. She was previously tested for celiac disease in 12/2020 " "and was eating gluten at that time. This was negative. She states her PCP thinks she also has this. She is hoping to get some testing.    Symptoms of abdominal cramping, bloating for one month, sporadic onset and also with meals, lasting up to 24 hours or more. Feels like organs are being \"twisted\" and \"stabbed\", \"brain fog\", feels tired, feels like skin is cold. If she eats gluten, she will get itchy scabs on her scalp. Pain can occur in generalized area or anywhere in the abdomen, LUQ. Postprandial nausea, 1-2 hours, tends to occur more at night or in the AM. Doesn't occur if she eats salad. No emesis. She reports acid reflux and heartburn after every meal. This is longstanding since childhood. She tried omeprazole 40 mg for about 50 days.Still adheres to GFD with strict adherence. She has dysphagia to dense solids with every food, will regurgitate acid or saliva. She feels like she also chokes on fluids. This is immediate with swallowing.    Bowel habits - constipation since childhood. She can go once every 2 weeks. Last BM yesterday, previously 7 days ago, a/w straining. Hard, dry 80% of the time, 20% diarrhea. There is bright red blood coating the stool intermittently. Last noted one week ago, ongoing for years.  Pain with evacuation.    FMHx - mom with RA, possible lupus. Grandma - multiple autoimmune conditions    ROS: 10pt ROS performed and otherwise negative.    PAST MEDICAL HISTORY:  Past Medical History:   Diagnosis Date     Abnormal Pap smear of cervix 09/03/2019    See problem list     Anxiety      Depressive disorder      Hypothyroidism      Thyroid disease        PREVIOUS ABDOMINAL/GYNECOLOGIC SURGERIES:    Past Surgical History:   Procedure Laterality Date     LAPAROSCOPIC CHOLECYSTECTOMY N/A 5/15/2020    Procedure: laparoscopic cholecystectomy;  Surgeon: Peter Hidalgo MD;  Location: [facility] OR         PERTINENT MEDICATIONS:  Current Outpatient Medications   Medication Sig Dispense Refill     buPROPion " (WELLBUTRIN XL) 150 MG 24 hr tablet Take 1 tablet (150 mg) by mouth every morning 30 tablet 0     escitalopram (LEXAPRO) 20 MG tablet Take 1 tablet (20 mg) by mouth daily 90 tablet 0     levonorgestrel (MIRENA) 20 MCG/24HR IUD 1 each (20 mcg) by Intrauterine route once       levothyroxine (SYNTHROID/LEVOTHROID) 112 MCG tablet Take 1 tablet (112 mcg) by mouth daily 90 tablet 1     phentermine (ADIPEX-P) 15 MG capsule Take 1 capsule (15 mg) by mouth every morning 30 capsule 0     SOCIAL HISTORY:  Social History     Socioeconomic History     Marital status: Single     Spouse name: Not on file     Number of children: Not on file     Years of education: Not on file     Highest education level: Not on file   Occupational History     Not on file   Social Needs     Financial resource strain: Not on file     Food insecurity     Worry: Not on file     Inability: Not on file     Transportation needs     Medical: Not on file     Non-medical: Not on file   Tobacco Use     Smoking status: Never Smoker     Smokeless tobacco: Never Used   Substance and Sexual Activity     Alcohol use: Yes     Frequency: Monthly or less     Comment: once a month     Drug use: Never     Sexual activity: Yes     Partners: Male     Birth control/protection: Implant   Lifestyle     Physical activity     Days per week: Not on file     Minutes per session: Not on file     Stress: Not on file   Relationships     Social connections     Talks on phone: Not on file     Gets together: Not on file     Attends Baptism service: Not on file     Active member of club or organization: Not on file     Attends meetings of clubs or organizations: Not on file     Relationship status: Not on file     Intimate partner violence     Fear of current or ex partner: Not on file     Emotionally abused: Not on file     Physically abused: Not on file     Forced sexual activity: Not on file   Other Topics Concern     Parent/sibling w/ CABG, MI or angioplasty before 65F 55M? Not  Asked   Social History Narrative     Not on file       FAMILY HISTORY:    Family History   Problem Relation Age of Onset     Rheumatoid Arthritis Mother      Thyroid Disease Maternal Grandmother      Hypertension Sister        PHYSICAL EXAMINATION:  Vitals reviewed  There were no vitals taken for this visit.  Video physical exam  General: Patient appears well in no acute distress.   Skin: No visualized rash or lesions on visualized skin  Eyes: EOMI, no erythema, sclera icterus or discharge noted  Resp: Appears to be breathing comfortably without accessory muscle usage, speaking in full sentences, no cough  MSK: Appears to have normal range of motion based on visualized movements  Neurologic: No apparent tremors, facial movements symmetric  Psych: affect normal, alert and oriented    The rest of a comprehensive physical examination is deferred due to PHE (public health emergency) video restrictions      PERTINENT STUDIES Reviewed in EMR    ASSESSMENT/PLAN:    # Chronic constipation  # Abdominal pain, generalized  # Abdominal bloating  # Postprandial nausea   Longstanding constipation since childhood with bowel movement every one to two weeks, typically BSC 1, with ongoing symptoms despite thyroid replacement. She has felt that her symptoms were attributed to celiac disease. She was tested for this and negative by TTG IgA,though a serum IgA was not obtained. She is currently strictly adherent to GFD with ongoing symptoms.    Discussed that is possible that all of her symptoms are attributable to underlying constipation and excess stool burden. Differential includes slow transit vs CIC vs DGBI. There may be a component of pelvic floor dysfunction as well. However, given report of BRBPR and extensive family history of autoimmunity, will plan to proceed with colonoscopy for evaluation of inflammatory bowel disease. In regards to celiac testing, she does wish to re-introduce gluten in effort to fully evaluate this as a  potential underlying etiology.    -- Reintroduce gluten at least 2 grams daily  -- Following this, will obtain blood work (TTG, IgA, ESR, CRP, CBC). Recent BMP unremarkable.  -- In the meantime, start Miralax, titrate to effect as per patient instructions, with introduction of soluble fiber supplement about 2 weeks after initiating of Miralax  -- Colonoscopy    # GERD  # Dysphagia   GERD symptoms were non-responsive to a two month trial of omeprazole 40 mg daily. It is possible that underlying constipation may be contributing. Given dysphagia, will complete upper endoscopy to rule out EoE, stricture, esophagitis. Future consideration for barium esophagram pending the endoscopy. If her thyroid test remains erratic on supplementation and no clear reason for dysphagia, may recommend thyroid US given family history of thyroid malignancy.    RTC 6-8 weeks  Thank you for this consultation. It was a pleasure to participate in the care of this patient; please contact us with any further questions.    Belen Morales PA-C    57minutes spent on the date of the encounter doing chart review, review of test results, patient visit and documentation

## 2021-05-18 NOTE — PROGRESS NOTES
"Mora Maldonado is a 26 year old female who is being evaluated via a billable video visit.      The patient has been notified of following:     \"This video visit will be conducted via a call between you and your physician/provider. We have found that certain health care needs can be provided without the need for an in-person physical exam.  This service lets us provide the care you need with a video conversation.  If a prescription is necessary we can send it directly to your pharmacy.  If lab work is needed we can place an order for that and you can then stop by our lab to have the test done at a later time.    If during the course of the call the physician/provider feels a video visit is not appropriate, you will not be charged for this service.\"     Patient confirmed that they are in [place] for today's visit YES.    Video-Visit Details  Type of service:  Video Visit    Video Start Time: 102  Video End Time:  140    Originating Location (pt. Location): [place]    Distant Location (provider location):  [facility] [place]     Platform used: [facility]              GI CLINIC VISIT    CC/REFERRING PROVIDER: Amrita Rose  REASON FOR CONSULTATION: abdominal pain    HPI: 26 year old female with PMH of anxiety, depression, hypothyroidism who is s/p cholecystectomy (2020) presenting to GI clinic for abdominal pain and other symptoms.    Presented with RUQ pain in May 2020, with RUQ US showing cholelithiasis without sonographic evidence of acute cholecystitis. Same day CT AP was unremarkable. She underwent CCY for this.     She questions if she has celiac disease. She was previously tested for celiac disease in 12/2020 and was eating gluten at that time. This was negative. She states her PCP thinks she also has this. She is hoping to get some testing.    Symptoms of abdominal cramping, bloating for one month, sporadic onset and also with meals, lasting up to 24 hours or more. Feels like " "organs are being \"twisted\" and \"stabbed\", \"brain fog\", feels tired, feels like skin is cold. If she eats gluten, she will get itchy scabs on her scalp. Pain can occur in generalized area or anywhere in the abdomen, LUQ. Postprandial nausea, 1-2 hours, tends to occur more at night or in the AM. Doesn't occur if she eats salad. No emesis. She reports acid reflux and heartburn after every meal. This is longstanding since childhood. She tried omeprazole 40 mg for about 50 days.Still adheres to GFD with strict adherence. She has dysphagia to dense solids with every food, will regurgitate acid or saliva. She feels like she also chokes on fluids. This is immediate with swallowing.    Bowel habits - constipation since childhood. She can go once every 2 weeks. Last BM yesterday, previously 7 days ago, a/w straining. Hard, dry 80% of the time, 20% diarrhea. There is bright red blood coating the stool intermittently. Last noted one week ago, ongoing for years.  Pain with evacuation.    FMHx - mom with RA, possible lupus. Grandma - multiple autoimmune conditions    ROS: 10pt ROS performed and otherwise negative.    PAST MEDICAL HISTORY:  Past Medical History:   Diagnosis Date     Abnormal Pap smear of cervix 09/03/2019    See problem list     Anxiety      Depressive disorder      Hypothyroidism      Thyroid disease        PREVIOUS ABDOMINAL/GYNECOLOGIC SURGERIES:    Past Surgical History:   Procedure Laterality Date     LAPAROSCOPIC CHOLECYSTECTOMY N/A 5/15/2020    Procedure: laparoscopic cholecystectomy;  Surgeon: Peter Hidalgo MD;  Location: [facility]         PERTINENT MEDICATIONS:  Current Outpatient Medications   Medication Sig Dispense Refill     buPROPion (WELLBUTRIN XL) 150 MG 24 hr tablet Take 1 tablet (150 mg) by mouth every morning 30 tablet 0     escitalopram (LEXAPRO) 20 MG tablet Take 1 tablet (20 mg) by mouth daily 90 tablet 0     levonorgestrel (MIRENA) 20 MCG/24HR IUD 1 each (20 mcg) by Intrauterine route once   "     levothyroxine (SYNTHROID/LEVOTHROID) 112 MCG tablet Take 1 tablet (112 mcg) by mouth daily 90 tablet 1     phentermine (ADIPEX-P) 15 MG capsule Take 1 capsule (15 mg) by mouth every morning 30 capsule 0     SOCIAL HISTORY:  Social History     Socioeconomic History     Marital status: Single     Spouse name: Not on file     Number of children: Not on file     Years of education: Not on file     Highest education level: Not on file   Occupational History     Not on file   Social Needs     Financial resource strain: Not on file     Food insecurity     Worry: Not on file     Inability: Not on file     Transportation needs     Medical: Not on file     Non-medical: Not on file   Tobacco Use     Smoking status: Never Smoker     Smokeless tobacco: Never Used   Substance and Sexual Activity     Alcohol use: Yes     Frequency: Monthly or less     Comment: once a month     Drug use: Never     Sexual activity: Yes     Partners: Male     Birth control/protection: Implant   Lifestyle     Physical activity     Days per week: Not on file     Minutes per session: Not on file     Stress: Not on file   Relationships     Social connections     Talks on phone: Not on file     Gets together: Not on file     Attends Alevism service: Not on file     Active member of club or organization: Not on file     Attends meetings of clubs or organizations: Not on file     Relationship status: Not on file     Intimate partner violence     Fear of current or ex partner: Not on file     Emotionally abused: Not on file     Physically abused: Not on file     Forced sexual activity: Not on file   Other Topics Concern     Parent/sibling w/ CABG, MI or angioplasty before 65F 55M? Not Asked   Social History Narrative     Not on file       FAMILY HISTORY:    Family History   Problem Relation Age of Onset     Rheumatoid Arthritis Mother      Thyroid Disease Maternal Grandmother      Hypertension Sister        PHYSICAL EXAMINATION:  Vitals reviewed  There  were no vitals taken for this visit.  Video physical exam  General: Patient appears well in no acute distress.   Skin: No visualized rash or lesions on visualized skin  Eyes: EOMI, no erythema, sclera icterus or discharge noted  Resp: Appears to be breathing comfortably without accessory muscle usage, speaking in full sentences, no cough  MSK: Appears to have normal range of motion based on visualized movements  Neurologic: No apparent tremors, facial movements symmetric  Psych: affect normal, alert and oriented    The rest of a comprehensive physical examination is deferred due to PHE (public health emergency) video restrictions      PERTINENT STUDIES Reviewed in EMR    ASSESSMENT/PLAN:    # Chronic constipation  # Abdominal pain, generalized  # Abdominal bloating  # Postprandial nausea   Longstanding constipation since childhood with bowel movement every one to two weeks, typically BSC 1, with ongoing symptoms despite thyroid replacement. She has felt that her symptoms were attributed to celiac disease. She was tested for this and negative by TTG IgA,though a serum IgA was not obtained. She is currently strictly adherent to GFD with ongoing symptoms.    Discussed that is possible that all of her symptoms are attributable to underlying constipation and excess stool burden. Differential includes slow transit vs CIC vs DGBI. There may be a component of pelvic floor dysfunction as well. However, given report of BRBPR and extensive family history of autoimmunity, will plan to proceed with colonoscopy for evaluation of inflammatory bowel disease. In regards to celiac testing, she does wish to re-introduce gluten in effort to fully evaluate this as a potential underlying etiology.    -- Reintroduce gluten at least 2 grams daily  -- Following this, will obtain blood work (TTG, IgA, ESR, CRP, CBC). Recent BMP unremarkable.  -- In the meantime, start Miralax, titrate to effect as per patient instructions, with introduction  of soluble fiber supplement about 2 weeks after initiating of Miralax  -- Colonoscopy    # GERD  # Dysphagia   GERD symptoms were non-responsive to a two month trial of omeprazole 40 mg daily. It is possible that underlying constipation may be contributing. Given dysphagia, will complete upper endoscopy to rule out EoE, stricture, esophagitis. Future consideration for barium esophagram pending the endoscopy. If her thyroid test remains erratic on supplementation and no clear reason for dysphagia, may recommend thyroid US given family history of thyroid malignancy.    RTC 6-8 weeks  Thank you for this consultation. It was a pleasure to participate in the care of this patient; please contact us with any further questions.    Belen Morales PA-C    57minutes spent on the date of the encounter doing chart review, review of test results, patient visit and documentation

## 2021-05-20 ENCOUNTER — TELEPHONE (OUTPATIENT)
Dept: GASTROENTEROLOGY | Facility: CLINIC | Age: 26
End: 2021-05-20

## 2021-05-20 DIAGNOSIS — Z11.59 ENCOUNTER FOR SCREENING FOR OTHER VIRAL DISEASES: ICD-10-CM

## 2021-05-20 NOTE — TELEPHONE ENCOUNTER
Screening Questions  1. What insurance is in the chart? [email or web address]    2.  Ordering/Referring Provider: mayra    3. BMI 29.3    4. Are you on daily home oxygen? no    5. Do you have a history of difficult airway? no    6. Have you had a heart, lung, or liver transplant? no    7. Are you currently on dialysis? no    8. Have you had a stroke or Transient ischemic atttack (TIA) within 6 months? n    9. In the past 6 months, have you had any heart related issues including cardiomyopathy or heart attack?         If yes, did it require cardiac stenting or other implantable device? no    10. Do you have any implantable devices in your body (pacemaker, defib, LVAD)? no    11. Do you take nitroglycerin? If yes, how often? no    12. Are you currently taking any blood thinners?no    13. Are you a diabetic? no    14. (Females) Are you currently pregnant? no  If yes, how many weeks?    15. Have you had a procedure in the past that was difficult to tolerate with conscious sedation? Any allergies to Fentanyl or Versed no    16. Are you taking any scheduled prescription narcotics more than once daily? no    17. Do you have any chemical dependencies such as alcohol, street drugs, or methadone? no    18. Do you have any history of post-traumatic stress syndrome or mental health issues?  Depression anxiety     19. Do you transfer independently? yes    20.  Do you have any issues with constipation? yes    21. Preferred Pharmacy for Pre Prescription  On chart      Scheduling Details    Procedure Scheduled: EGD and Colon  Provider/Surgeon: Jose  Date of Procedure: 6/3/2021  Location: [facility]  Caller (Please ask for phone number if not scheduled by patient): Mora Maldonado      Sedation Type: MAC  Conscious Sedation- Needs  for 6 hours after the procedure  MAC/General-Needs  for 24 hours after procedure    Pre-op Required at [facility] and OR for  MAC sedation:   (if yes advise patient they will need a pre-op prior to  procedure)      Is patient on blood thinners? -no (If yes- inform patient to follow up with PCP or provider for follow up instructions)     Informed patient they will need an adult  yes  Cannot take any type of public or medical transportation alone    Informed Patient of COVID Test Requirement yes    Confirmed Nurse will call to complete assessment yes    Additional comments: no

## 2021-05-21 ENCOUNTER — TELEPHONE (OUTPATIENT)
Dept: GASTROENTEROLOGY | Facility: CLINIC | Age: 26
End: 2021-05-21

## 2021-05-21 DIAGNOSIS — K59.00 CONSTIPATION, UNSPECIFIED CONSTIPATION TYPE: Primary | ICD-10-CM

## 2021-05-21 DIAGNOSIS — K62.5 BRBPR (BRIGHT RED BLOOD PER RECTUM): ICD-10-CM

## 2021-05-21 RX ORDER — BISACODYL 5 MG
TABLET, DELAYED RELEASE (ENTERIC COATED) ORAL
Qty: 2 TABLET | Refills: 0 | Status: SHIPPED | OUTPATIENT
Start: 2021-05-21 | End: 2021-06-03

## 2021-05-21 NOTE — TELEPHONE ENCOUNTER
RN contacted [facility] pharmacy in [place] they do not have golytely or a substitute in stock.    Attempted to contact patient regarding upcoming colonoscopy and EGD procedure on 6.3.2021 for pre assessment questions.  No answer.  Left message to return call to [phone] #2    COVID test 6.1.2021    Verify pharmacy for extended prep.  Extended prep instructions sent via [employer]    Cassy Valdivia RN

## 2021-05-21 NOTE — TELEPHONE ENCOUNTER
Writer reviewed pre-assessment questions with patient prior to upcoming colonoscopy and EGD on 6.3.2021.  COVID test scheduled for 6.1.2021.    Reviewed extended prep instructions with patient.  Noting no nuts, seeds, or popcorn 7 days prior to procedure.     Designated  policy reviewed with patient.     Patient verbalized understanding.  No further questions or concerns.    Cassy Valdivia RN

## 2021-06-01 DIAGNOSIS — Z11.59 ENCOUNTER FOR SCREENING FOR OTHER VIRAL DISEASES: ICD-10-CM

## 2021-06-01 LAB
SARS-COV-2 RNA RESP QL NAA+PROBE: NORMAL
SPECIMEN SOURCE: NORMAL

## 2021-06-01 PROCEDURE — U0005 INFEC AGEN DETEC AMPLI PROBE: HCPCS | Performed by: INTERNAL MEDICINE

## 2021-06-01 PROCEDURE — U0003 INFECTIOUS AGENT DETECTION BY NUCLEIC ACID (DNA OR RNA); SEVERE ACUTE RESPIRATORY SYNDROME CORONAVIRUS 2 (SARS-COV-2) (CORONAVIRUS DISEASE [COVID-19]), AMPLIFIED PROBE TECHNIQUE, MAKING USE OF HIGH THROUGHPUT TECHNOLOGIES AS DESCRIBED BY CMS-2020-01-R: HCPCS | Performed by: INTERNAL MEDICINE

## 2021-06-02 ENCOUNTER — NURSE TRIAGE (OUTPATIENT)
Dept: NURSING | Facility: CLINIC | Age: 26
End: 2021-06-02

## 2021-06-02 ENCOUNTER — ANESTHESIA EVENT (OUTPATIENT)
Dept: SURGERY | Facility: AMBULATORY SURGERY CENTER | Age: 26
End: 2021-06-02
Payer: COMMERCIAL

## 2021-06-02 LAB
LABORATORY COMMENT REPORT: NORMAL
SARS-COV-2 RNA RESP QL NAA+PROBE: NEGATIVE
SPECIMEN SOURCE: NORMAL

## 2021-06-03 ENCOUNTER — ANESTHESIA (OUTPATIENT)
Dept: SURGERY | Facility: AMBULATORY SURGERY CENTER | Age: 26
End: 2021-06-03
Payer: COMMERCIAL

## 2021-06-03 ENCOUNTER — HOSPITAL ENCOUNTER (OUTPATIENT)
Facility: AMBULATORY SURGERY CENTER | Age: 26
End: 2021-06-03
Attending: INTERNAL MEDICINE
Payer: COMMERCIAL

## 2021-06-03 VITALS
TEMPERATURE: 98.1 F | OXYGEN SATURATION: 98 % | DIASTOLIC BLOOD PRESSURE: 74 MMHG | BODY MASS INDEX: 28.71 KG/M2 | RESPIRATION RATE: 20 BRPM | HEIGHT: 62 IN | WEIGHT: 156 LBS | SYSTOLIC BLOOD PRESSURE: 108 MMHG | HEART RATE: 97 BPM

## 2021-06-03 VITALS — HEART RATE: 86 BPM

## 2021-06-03 DIAGNOSIS — K52.9 CHRONIC DIARRHEA: ICD-10-CM

## 2021-06-03 DIAGNOSIS — R13.19 ESOPHAGEAL DYSPHAGIA: Primary | ICD-10-CM

## 2021-06-03 LAB
COLONOSCOPY: NORMAL
HCG UR QL: NEGATIVE
INTERNAL QC OK POCT: YES
UPPER GI ENDOSCOPY: NORMAL

## 2021-06-03 PROCEDURE — 81025 URINE PREGNANCY TEST: CPT | Performed by: PATHOLOGY

## 2021-06-03 PROCEDURE — 45380 COLONOSCOPY AND BIOPSY: CPT

## 2021-06-03 PROCEDURE — 43239 EGD BIOPSY SINGLE/MULTIPLE: CPT

## 2021-06-03 PROCEDURE — 88305 TISSUE EXAM BY PATHOLOGIST: CPT | Performed by: PATHOLOGY

## 2021-06-03 RX ORDER — ONDANSETRON 2 MG/ML
4 INJECTION INTRAMUSCULAR; INTRAVENOUS
Status: DISCONTINUED | OUTPATIENT
Start: 2021-06-03 | End: 2021-06-04 | Stop reason: HOSPADM

## 2021-06-03 RX ORDER — ONDANSETRON 2 MG/ML
4 INJECTION INTRAMUSCULAR; INTRAVENOUS EVERY 6 HOURS PRN
Status: CANCELLED | OUTPATIENT
Start: 2021-06-03

## 2021-06-03 RX ORDER — PROPOFOL 10 MG/ML
INJECTION, EMULSION INTRAVENOUS CONTINUOUS PRN
Status: DISCONTINUED | OUTPATIENT
Start: 2021-06-03 | End: 2021-06-03

## 2021-06-03 RX ORDER — GLYCOPYRROLATE 0.2 MG/ML
INJECTION, SOLUTION INTRAMUSCULAR; INTRAVENOUS PRN
Status: DISCONTINUED | OUTPATIENT
Start: 2021-06-03 | End: 2021-06-03

## 2021-06-03 RX ORDER — SODIUM CHLORIDE, SODIUM LACTATE, POTASSIUM CHLORIDE, CALCIUM CHLORIDE 600; 310; 30; 20 MG/100ML; MG/100ML; MG/100ML; MG/100ML
INJECTION, SOLUTION INTRAVENOUS CONTINUOUS PRN
Status: DISCONTINUED | OUTPATIENT
Start: 2021-06-03 | End: 2021-06-03

## 2021-06-03 RX ORDER — ONDANSETRON 4 MG/1
4 TABLET, ORALLY DISINTEGRATING ORAL EVERY 6 HOURS PRN
Status: CANCELLED | OUTPATIENT
Start: 2021-06-03

## 2021-06-03 RX ORDER — NALOXONE HYDROCHLORIDE 0.4 MG/ML
0.2 INJECTION, SOLUTION INTRAMUSCULAR; INTRAVENOUS; SUBCUTANEOUS
Status: CANCELLED | OUTPATIENT
Start: 2021-06-03

## 2021-06-03 RX ORDER — LIDOCAINE 40 MG/G
CREAM TOPICAL
Status: DISCONTINUED | OUTPATIENT
Start: 2021-06-03 | End: 2021-06-04 | Stop reason: HOSPADM

## 2021-06-03 RX ORDER — SODIUM CHLORIDE 9 MG/ML
500 INJECTION, SOLUTION INTRAVENOUS CONTINUOUS
Status: DISCONTINUED | OUTPATIENT
Start: 2021-06-03 | End: 2021-06-04 | Stop reason: HOSPADM

## 2021-06-03 RX ORDER — FLUMAZENIL 0.1 MG/ML
0.2 INJECTION, SOLUTION INTRAVENOUS
Status: CANCELLED | OUTPATIENT
Start: 2021-06-03 | End: 2021-06-03

## 2021-06-03 RX ORDER — PROPOFOL 10 MG/ML
INJECTION, EMULSION INTRAVENOUS PRN
Status: DISCONTINUED | OUTPATIENT
Start: 2021-06-03 | End: 2021-06-03

## 2021-06-03 RX ORDER — LIDOCAINE HYDROCHLORIDE 20 MG/ML
INJECTION, SOLUTION INFILTRATION; PERINEURAL PRN
Status: DISCONTINUED | OUTPATIENT
Start: 2021-06-03 | End: 2021-06-03

## 2021-06-03 RX ORDER — NALOXONE HYDROCHLORIDE 0.4 MG/ML
0.4 INJECTION, SOLUTION INTRAMUSCULAR; INTRAVENOUS; SUBCUTANEOUS
Status: CANCELLED | OUTPATIENT
Start: 2021-06-03

## 2021-06-03 RX ORDER — PROCHLORPERAZINE MALEATE 10 MG
10 TABLET ORAL EVERY 6 HOURS PRN
Status: CANCELLED | OUTPATIENT
Start: 2021-06-03

## 2021-06-03 RX ORDER — SIMETHICONE
LIQUID (ML) MISCELLANEOUS PRN
Status: DISCONTINUED | OUTPATIENT
Start: 2021-06-03 | End: 2021-06-03 | Stop reason: HOSPADM

## 2021-06-03 RX ADMIN — LIDOCAINE HYDROCHLORIDE 80 MG: 20 INJECTION, SOLUTION INFILTRATION; PERINEURAL at 09:21

## 2021-06-03 RX ADMIN — GLYCOPYRROLATE 0.2 MG: 0.2 INJECTION, SOLUTION INTRAMUSCULAR; INTRAVENOUS at 09:21

## 2021-06-03 RX ADMIN — PROPOFOL 50 MG: 10 INJECTION, EMULSION INTRAVENOUS at 09:35

## 2021-06-03 RX ADMIN — SODIUM CHLORIDE 500 ML: 9 INJECTION, SOLUTION INTRAVENOUS at 08:31

## 2021-06-03 RX ADMIN — PROPOFOL 150 MCG/KG/MIN: 10 INJECTION, EMULSION INTRAVENOUS at 09:23

## 2021-06-03 RX ADMIN — PROPOFOL 40 MG: 10 INJECTION, EMULSION INTRAVENOUS at 09:22

## 2021-06-03 ASSESSMENT — MIFFLIN-ST. JEOR: SCORE: 1390.86

## 2021-06-03 NOTE — H&P
Mora CARBAJAL Austin  [id number]  female  26 year old      Reason for procedure/surgery: R/O celiac disease/ Chronic diarrhoea.    Patient Active Problem List   Diagnosis     Papanicolaou smear of cervix with low grade squamous intraepithelial lesion (LGSIL)     Hypothyroidism, unspecified type     Adjustment disorder with mixed anxiety and depressed mood     IUD (intrauterine device) in place - due for removal 11/2026       Past Surgical History:    Past Surgical History:   Procedure Laterality Date     LAPAROSCOPIC CHOLECYSTECTOMY N/A 5/15/2020    Procedure: laparoscopic cholecystectomy;  Surgeon: Peter Hidalgo MD;  Location: [facility] OR       Past Medical History:   Past Medical History:   Diagnosis Date     Abnormal Pap smear of cervix 09/03/2019    See problem list     Anxiety      Depressive disorder      Hypothyroidism      Thyroid disease        Social History:   Social History     Tobacco Use     Smoking status: Never Smoker     Smokeless tobacco: Never Used   Substance Use Topics     Alcohol use: Yes     Frequency: Monthly or less     Comment: once a month       Family History:   Family History   Problem Relation Age of Onset     Rheumatoid Arthritis Mother      Thyroid Disease Maternal Grandmother      Hypertension Sister      Crohn's Disease No family hx of      Ulcerative Colitis No family hx of        Allergies:   Allergies   Allergen Reactions     Amoxicillin Hives     Penicillins Hives       Active Medications:   Current Outpatient Medications   Medication Sig Dispense Refill     buPROPion (WELLBUTRIN XL) 150 MG 24 hr tablet Take 1 tablet (150 mg) by mouth every morning 30 tablet 0     escitalopram (LEXAPRO) 20 MG tablet Take 1 tablet (20 mg) by mouth daily 90 tablet 0     levonorgestrel (MIRENA) 20 MCG/24HR IUD 1 each (20 mcg) by Intrauterine route once       levothyroxine (SYNTHROID/LEVOTHROID) 112 MCG tablet Take 1 tablet (112 mcg) by mouth daily 90 tablet 1     phentermine (ADIPEX-P) 15 MG capsule Take 1  "capsule (15 mg) by mouth every morning 30 capsule 0       Systemic Review:   CONSTITUTIONAL: NEGATIVE for fever, chills, change in weight  ENT/MOUTH: NEGATIVE for ear, mouth and throat problems  RESP: NEGATIVE for significant cough or SOB  CV: NEGATIVE for chest pain, palpitations or peripheral edema    Physical Examination:   Vital Signs: /74   Pulse 97   Temp 98.1  F (36.7  C) (Temporal)   Resp 20   Ht 1.575 m (5' 2\")   Wt 70.8 kg (156 lb)   SpO2 98%   BMI 28.53 kg/m    GENERAL: healthy, alert and no distress  NECK: no adenopathy, no asymmetry, masses, or scars  RESP: lungs clear to auscultation - no rales, rhonchi or wheezes  CV: regular rate and rhythm, normal S1 S2, no S3 or S4, no murmur, click or rub, no peripheral edema and peripheral pulses strong  ABDOMEN: soft, nontender, no hepatosplenomegaly, no masses and bowel sounds normal  MS: no gross musculoskeletal defects noted, no edema    Plan: Appropriate to proceed as scheduled.      Lindy Garcia MD  6/3/2021    PCP:  Amrita Rose"

## 2021-06-03 NOTE — ANESTHESIA PREPROCEDURE EVALUATION
Anesthesia Pre-Procedure Evaluation    Patient: Mora Maldonado   MRN: [id number] : 1995        Preoperative Diagnosis: Dysphagia, unspecified type [R13.10]   Procedure : Procedure(s):  ESOPHAGOGASTRODUODENOSCOPY (EGD)  COLONOSCOPY     Past Medical History:   Diagnosis Date     Abnormal Pap smear of cervix 2019    See problem list     Anxiety      Depressive disorder      Hypothyroidism      Thyroid disease       Past Surgical History:   Procedure Laterality Date     LAPAROSCOPIC CHOLECYSTECTOMY N/A 5/15/2020    Procedure: laparoscopic cholecystectomy;  Surgeon: Peter Hidalgo MD;  Location: [facility]      Allergies   Allergen Reactions     Amoxicillin Hives     Penicillins Hives      Social History     Tobacco Use     Smoking status: Never Smoker     Smokeless tobacco: Never Used   Substance Use Topics     Alcohol use: Yes     Frequency: Monthly or less     Comment: once a month      Wt Readings from Last 1 Encounters:   21 70.3 kg (155 lb)        Anesthesia Evaluation   Pt has had prior anesthetic.     No history of anesthetic complications       ROS/MED HX  ENT/Pulmonary:  - neg pulmonary ROS     Neurologic:  - neg neurologic ROS     Cardiovascular:  - neg cardiovascular ROS     METS/Exercise Tolerance: >4 METS    Hematologic:  - neg hematologic  ROS     Musculoskeletal:  - neg musculoskeletal ROS     GI/Hepatic:     (+) GERD,     Renal/Genitourinary:  - neg Renal ROS     Endo:     (+) thyroid problem, hypothyroidism,     Psychiatric/Substance Use:     (+) psychiatric history anxiety and depression     Infectious Disease:  - neg infectious disease ROS     Malignancy:  - neg malignancy ROS     Other:  - neg other ROS          Physical Exam    Airway  airway exam normal           Respiratory Devices and Support         Dental  no notable dental history         Cardiovascular   cardiovascular exam normal          Pulmonary   pulmonary exam normal                OUTSIDE LABS:  CBC:   Lab Results    Component Value Date    WBC 9.8 05/19/2020    WBC 8.5 05/13/2020    HGB 13.5 05/19/2020    HGB 14.2 05/13/2020    HCT 39.1 05/19/2020    HCT 42.3 05/13/2020     05/19/2020     05/13/2020     BMP:   Lab Results   Component Value Date     04/13/2021     05/19/2020    POTASSIUM 4.3 04/13/2021    POTASSIUM 3.9 05/19/2020    CHLORIDE 104 04/13/2021    CHLORIDE 105 05/19/2020    CO2 23 04/13/2021    CO2 25 05/19/2020    BUN 14 04/13/2021    BUN 15 05/19/2020    CR 0.90 04/13/2021    CR 0.69 05/19/2020    GLC 78 04/13/2021    GLC 81 05/19/2020     COAGS: No results found for: PTT, INR, FIBR  POC:   Lab Results   Component Value Date    HCG Negative 11/27/2020     HEPATIC:   Lab Results   Component Value Date    ALBUMIN 3.7 05/19/2020    PROTTOTAL 7.6 05/19/2020    ALT 31 05/19/2020    AST 19 05/19/2020    ALKPHOS 83 05/19/2020    BILITOTAL 0.3 05/19/2020     OTHER:   Lab Results   Component Value Date    A1C 4.9 11/12/2019    NOEL 9.2 04/13/2021    LIPASE 120 05/19/2020    AMYLASE 81 04/14/2020    TSH 0.12 (L) 04/13/2021    T4 1.57 (H) 04/13/2021    CRP <2.9 09/03/2019    SED 9 09/03/2019       Anesthesia Plan    ASA Status:  2   NPO Status:  NPO Appropriate    Anesthesia Type: MAC.     - Reason for MAC: straight local not clinically adequate   Induction: Intravenous.   Maintenance: TIVA.        Consents    Anesthesia Plan(s) and associated risks, benefits, and realistic alternatives discussed. Questions answered and patient/representative(s) expressed understanding.     - Discussed with:  Patient      - Specific Concerns: Awareness/recall, major complications.        Postoperative Care       PONV prophylaxis: Ondansetron (or other 5HT-3), Background Propofol Infusion     Comments:                Perry Pearson MD

## 2021-06-03 NOTE — TELEPHONE ENCOUNTER
"Colonoscopy & endoscopy tomorrow. Is drinking the prep - she is completely clear - but only 1/2 way through first container. Is wondering if she needs to continue the prep. Advised patient that instructions state 'You must drink all of the solution\". Patient will continue with the prep.    Kathleen Newton RN on 6/2/2021 at 9:10 PM     Reason for Disposition    Health Information question, no triage required and triager able to answer question    Additional Information    Negative: [1] Caller is not with the adult (patient) AND [2] reporting urgent symptoms    Negative: Lab result questions    Negative: Medication questions    Negative: Caller can't be reached by phone    Negative: Caller has already spoken to PCP or another triager    Negative: RN needs further essential information from caller in order to complete triage    Negative: Requesting regular office appointment    Negative: [1] Caller requesting NON-URGENT health information AND [2] PCP's office is the best resource    Protocols used: INFORMATION ONLY CALL - NO TRIAGE-A-      "

## 2021-06-03 NOTE — ANESTHESIA CARE TRANSFER NOTE
Patient: Mora Maldonado    Procedure(s):  ESOPHAGOGASTRODUODENOSCOPY, WITH BIOPSY  COLONOSCOPY, WITH BIOPSY    Diagnosis: Dysphagia, unspecified type [R13.10]  Diagnosis Additional Information: No value filed.    Anesthesia Type:   MAC     Note:    Oropharynx: spontaneously breathing  Level of Consciousness: awake  Oxygen Supplementation: room air    Independent Airway: airway patency satisfactory and stable  Dentition: dentition unchanged  Vital Signs Stable: post-procedure vital signs reviewed and stable  Report to RN Given: handoff report given  Patient transferred to: Phase II  Comments: 93/66  100%  98.6-104-15    Handoff Report: Identifed the Patient, Identified the Reponsible Provider, Reviewed the pertinent medical history, Discussed the surgical course, Reviewed Intra-OP anesthesia mangement and issues during anesthesia, Set expectations for post-procedure period and Allowed opportunity for questions and acknowledgement of understanding      Vitals: (Last set prior to Anesthesia Care Transfer)  CRNA VITALS  6/3/2021 0945 - 6/3/2021 1021      6/3/2021             Ht Rate:  96        Electronically Signed By: SHAWNEE Blackwell CRNA  Sendy 3, 2021  10:21 AM

## 2021-06-03 NOTE — ANESTHESIA POSTPROCEDURE EVALUATION
Patient: Mora Maldonado    Procedure(s):  ESOPHAGOGASTRODUODENOSCOPY, WITH BIOPSY  COLONOSCOPY, WITH BIOPSY    Diagnosis:Dysphagia, unspecified type [R13.10]  Diagnosis Additional Information: No value filed.    Anesthesia Type:  MAC    Note:  Disposition: Outpatient   Postop Pain Control: Uneventful            Sign Out: Well controlled pain   PONV: No   Neuro/Psych: Uneventful            Sign Out: Acceptable/Baseline neuro status   Airway/Respiratory: Uneventful            Sign Out: Acceptable/Baseline resp. status   CV/Hemodynamics: Uneventful            Sign Out: Acceptable CV status; No obvious hypovolemia; No obvious fluid overload   Other NRE: NONE   DID A NON-ROUTINE EVENT OCCUR? No           Last vitals:  Vitals:    06/03/21 1019 06/03/21 1034 06/03/21 1049   BP: 93/66 96/66 108/74   Pulse:      Resp: 18 18 20   Temp: 36.7  C (98  F)  36.7  C (98.1  F)   SpO2: 100% 100% 98%       Last vitals prior to Anesthesia Care Transfer:  CRNA VITALS  6/3/2021 0945 - 6/3/2021 1045      6/3/2021             Ht Rate:  96          Electronically Signed By: Perry Pearson MD  Sendy 3, 2021  11:07 AM

## 2021-06-04 DIAGNOSIS — F43.23 ADJUSTMENT DISORDER WITH MIXED ANXIETY AND DEPRESSED MOOD: ICD-10-CM

## 2021-06-04 LAB — COPATH REPORT: NORMAL

## 2021-06-04 RX ORDER — BUPROPION HYDROCHLORIDE 150 MG/1
150 TABLET ORAL EVERY MORNING
Qty: 30 TABLET | Refills: 0 | Status: SHIPPED | OUTPATIENT
Start: 2021-06-04 | End: 2021-06-29

## 2021-06-10 ENCOUNTER — VIRTUAL VISIT (OUTPATIENT)
Dept: GASTROENTEROLOGY | Facility: CLINIC | Age: 26
End: 2021-06-10
Payer: COMMERCIAL

## 2021-06-10 VITALS — WEIGHT: 156 LBS | BODY MASS INDEX: 28.53 KG/M2

## 2021-06-10 DIAGNOSIS — R13.10 DYSPHAGIA, UNSPECIFIED TYPE: Primary | ICD-10-CM

## 2021-06-10 PROCEDURE — 99214 OFFICE O/P EST MOD 30 MIN: CPT | Mod: GT | Performed by: PHYSICIAN ASSISTANT

## 2021-06-10 NOTE — PATIENT INSTRUCTIONS
It was a pleasure taking care of you today.  I've included a brief summary of our discussion and care plan from today's visit below.  Please review this information with your primary care provider.  _______________________________________________________________________    My recommendations are summarized as follows:    -- thyroid ultrasound. To schedule imaging, please call [phone]     Goal: Improvement in stool frequency to reduce occurrence of straining, bloating, and abdominal discomfort.    Daily constipation plan:  -- Miralax: Miralax 1 capful daily, titrating to goal, up to 3 capfuls daily. If you have not had a bowel movement for 2-3 days, or if you feel that you straining, incompletely evacuating, plan to augment your daily plan by increasing Miralax dose, up to 3 capfuls daily, until stooling as resumed, and then return to previous dosing.  -- Increase dietary fiber as able to tolerate, with goal 20-30 grams daily. Prunes can be a great source of fiber and can be used daily to help with bowel movements. If certain foods are difficult for you to tolerate, consider meeting with our nutrition team to help you.  -- Fiber: After using Miralax for about 2 weeks, you can add in soluble fiber supplement (such as Citrucel, Metamucil, psyllium husk). Start with 1/2 tablespoon in 8 oz water daily, increase slowly to effect. A good goal is 1 tablespoon daily, with a maximum of 3 tablespoons daily. This helps with stool consistency.  - Try to stay active with at least 150 minutes of moderate intensity activity per week. This can include brisk walking, active gardening, cycling, yoga, pilates, etc.   - Try to stay hydrated, goal 48-64 oz of non-caffeinated fluid daily    Constipation back-up plan: If you have not had a bowel movement for 2-3 days, or if you feel that you straining, incompletely evacuating, plan to augment your daily plan by:  - Miralax: Increasing Miralax to up to 3 capfuls daily, returning to  daily maintenance dose above, once stools have resumed  - Magnesium:  Add in magnesium oxide 400 mg once to twice daily  - Products containing senna (ie Senakot, SmoothMove tea) can be used over a short period of time, but please be mindful that your bowels can develop a dependence to senna products, meaning your bowels will rely on Senna to produce a bowel movement.    Emergency back-up plan: If you continue to feel constipated and need more help:  - Purchase a bottle of magnesium citrate over the counter. Drink 1/2 to full bottle.  - If this is not effective, please call us.    Return to GI Clinic in 3 months to review your progress.    ______________________________________________________________________    How do I schedule labs, imaging studies, or procedures that were ordered in clinic today?     Labs: To schedule lab appointment at the [facility], use my chart or call [phone]. If you have a [place] lab closer to home where you are regularly seen you can give them a call.     Procedures: If a colonoscopy, upper endoscopy, breath test, esophageal manometry, or pH impedence was ordered today, our endoscopy team will call you to schedule this. If you have not heard from our endoscopy team within a week, please call [phone] to schedule.     Imaging Studies: If you were scheduled for a CT scan, X-ray, MRI, ultrasound, HIDA scan or other imaging study, please call [phone] to have this scheduled.     Referral: If a referral to another specialty was ordered, expect a phone call or follow instructions above. If you have not heard from anyone regarding your referral in a week, please call our clinic to check the status.     Who do I call with any questions after my visit?  Please be in touch if there are any further questions that arise following today's visit.  There are multiple ways to contact your gastroenterology care team.        During business hours, you may reach a  Gastroenterology nurse at [phone]      To schedule or reschedule an appointment, please call [phone].       You can always send a secure message through [email or web address].  [email or web address] messages are answered by your nurse or doctor typically within 24 hours.  Please allow extra time on weekends and holidays.        For urgent/emergent questions after business hours, you may reach the on-call GI Fellow by contacting the [facility] at [phone].     How will I get the results of any tests ordered?    You will receive all of your results.  If you have signed up for [employer], any tests ordered at your visit will be available to you after your physician reviews them.  Typically this takes 1-2 weeks.  If there are urgent results that require a change in your care plan, your physician or nurse will call you to discuss the next steps.      What is [email or web address]?  [email or web address] is a secure way for you to access all of your healthcare records from the [facility].  It is a web based computer program, so you can sign on to it from any location.  It also allows you to send secure messages to your care team.  I recommend signing up for [email or web address] access if you have not already done so and are comfortable with using a computer.      How to I schedule a follow-up visit?  If you did not schedule a follow-up visit today, please call [phone] to schedule a follow-up office visit.      Sincerely,    Belen Morales PA-C  Division of Gastroenterology, Hepatology & Nutrition  [facility]

## 2021-06-10 NOTE — PROGRESS NOTES
"GI CLINIC VISIT    CC/REFERRING PROVIDER: Amrita Rose  REASON FOR CONSULTATION: abdominal pain    HPI: 26 year old female with PMH of anxiety, depression, hypothyroidism who is s/p cholecystectomy (2020) presenting to GI clinic for follow-up of abdominal pain and other symptoms.    Presented with RUQ pain in May 2020, with RUQ US showing cholelithiasis without sonographic evidence of acute cholecystitis. Same day CT AP was unremarkable. She underwent CCY for this. At time of GI consult, she reported ymptoms of abdominal cramping, bloating for one month, sporadic onset and also with meals, lasting up to 24 hours or more. Feels like organs are being \"twisted\" and \"stabbed\", \"brain fog\", feels tired, feels like skin is cold. If she eats gluten, she will get itchy scabs on her scalp. Pain can occur in generalized area or anywhere in the abdomen, LUQ. Postprandial nausea, 1-2 hours, tends to occur more at night or in the AM. Doesn't occur if she eats salad. No emesis. She reports acid reflux and heartburn after every meal. This is longstanding since childhood. She tried omeprazole 40 mg for about 50 days.Still adheres to GFD with strict adherence. She has dysphagia to dense solids with every food, will regurgitate acid or saliva. She feels like she also chokes on fluids. This is immediate with swallowing. Reported constipation since childhood. She can go once every 2 weeks. Last BM yesterday, previously 7 days ago, a/w straining. Hard, dry 80% of the time, 20% diarrhea. There is bright red blood coating the stool intermittently.    Interval history:  EGD/colonoscopy performed 6/3/2021, unremarkable endoscopically with excerption of a few small fundic gland polyps. Biopsies negative for celiac disease. Colonic and TI biopsies unremarkable. Esophageal biopsies were not obtained.    Overall no change in symptoms from above. She felt like she cleared out with bowel prep for scope, but promptly returned to constipated stool " pattern. There was some occasional mild straining even with loose stool. Continues to have intermittent dysphagia to saliva, solids, and liquids at level of thyroid cartilage.      FMHx - mom with RA, possible lupus. Grandma - multiple autoimmune conditions    ROS: 10pt ROS performed and otherwise negative.    PAST MEDICAL HISTORY:  Past Medical History:   Diagnosis Date     Abnormal Pap smear of cervix 09/03/2019    See problem list     Anxiety      Depressive disorder      Hypothyroidism      Thyroid disease        PREVIOUS ABDOMINAL/GYNECOLOGIC SURGERIES:    Past Surgical History:   Procedure Laterality Date     COLONOSCOPY N/A 6/3/2021    Procedure: COLONOSCOPY, WITH BIOPSY;  Surgeon: Lindy Garcia MD;  [place] [facility] [place]     ESOPHAGOSCOPY, GASTROSCOPY, DUODENOSCOPY (EGD), COMBINED N/A 6/3/2021    Procedure: ESOPHAGOGASTRODUODENOSCOPY, WITH BIOPSY;  Surgeon: Lindy Garcia MD;  [place] [facility] [place]     LAPAROSCOPIC CHOLECYSTECTOMY N/A 5/15/2020    Procedure: laparoscopic cholecystectomy;  Surgeon: Peter Hidalgo MD;  Location: [facility] OR         PERTINENT MEDICATIONS:  Current Outpatient Medications   Medication Sig Dispense Refill     buPROPion (WELLBUTRIN XL) 150 MG 24 hr tablet Take 1 tablet (150 mg) by mouth every morning 30 tablet 0     escitalopram (LEXAPRO) 20 MG tablet Take 1 tablet (20 mg) by mouth daily 90 tablet 0     levonorgestrel (MIRENA) 20 MCG/24HR IUD 1 each (20 mcg) by Intrauterine route once       levothyroxine (SYNTHROID/LEVOTHROID) 112 MCG tablet Take 1 tablet (112 mcg) by mouth daily 90 tablet 1     phentermine (ADIPEX-P) 15 MG capsule Take 1 capsule (15 mg) by mouth every morning 30 capsule 0     SOCIAL HISTORY:  Social History     Socioeconomic History     Marital status: Single     Spouse name: Not on file     Number of children: Not on file     Years of education: Not on file     Highest education level: Not on file   Occupational History     Not on file   Social  Needs     Financial resource strain: Not on file     Food insecurity     Worry: Not on file     Inability: Not on file     Transportation needs     Medical: Not on file     Non-medical: Not on file   Tobacco Use     Smoking status: Never Smoker     Smokeless tobacco: Never Used   Substance and Sexual Activity     Alcohol use: Yes     Frequency: Monthly or less     Comment: once a month     Drug use: Never     Sexual activity: Yes     Partners: Male     Birth control/protection: Implant   Lifestyle     Physical activity     Days per week: Not on file     Minutes per session: Not on file     Stress: Not on file   Relationships     Social connections     Talks on phone: Not on file     Gets together: Not on file     Attends Yarsanism service: Not on file     Active member of club or organization: Not on file     Attends meetings of clubs or organizations: Not on file     Relationship status: Not on file     Intimate partner violence     Fear of current or ex partner: Not on file     Emotionally abused: Not on file     Physically abused: Not on file     Forced sexual activity: Not on file   Other Topics Concern     Parent/sibling w/ CABG, MI or angioplasty before 65F 55M? Not Asked   Social History Narrative     Not on file       FAMILY HISTORY:    Family History   Problem Relation Age of Onset     Rheumatoid Arthritis Mother      Thyroid Disease Maternal Grandmother      Hypertension Sister      Crohn's Disease No family hx of      Ulcerative Colitis No family hx of        PHYSICAL EXAMINATION:  Vitals reviewed  There were no vitals taken for this visit.  Video physical exam  General: Patient appears well in no acute distress.   Skin: No visualized rash or lesions on visualized skin  Eyes: EOMI, no erythema, sclera icterus or discharge noted  Resp: Appears to be breathing comfortably without accessory muscle usage, speaking in full sentences, no cough  MSK: Appears to have normal range of motion based on visualized  movements  Neurologic: No apparent tremors, facial movements symmetric  Psych: affect normal, alert and oriented    The rest of a comprehensive physical examination is deferred due to PHE (public health emergency) video restrictions      PERTINENT STUDIES Reviewed in EMR    ASSESSMENT/PLAN:    # Chronic constipation  # Abdominal pain, generalized  # Abdominal bloating  # Postprandial nausea   Longstanding constipation since childhood with bowel movement every one to two weeks, typically BSC 1, with ongoing symptoms despite thyroid replacement. Work-up with colonoscopy, celiac testing, inflammatory markers unremarkable. Differential includes slow transit vs CIC vs DGBI. There may be a component of pelvic floor dysfunction as well.     -- Trial of Miralax, titrate to effect, as per patient instructions  -- Add in soluble fiber supplement after 1-2 weeks  -- Referral to GI dietician  -- Future consideration for secretagogue, pelvic floor evaluation, referral to Dr. Cezar PsyD    # GERD  # Dysphagia   # Heartburn  GERD symptoms were non-responsive to a two month trial of omeprazole 40 mg daily. EGD was unrevealing for any structural etiologies. Biopsies were not obtained of the esophagus for evaluation of EoE. Discussed performing a thyroid US given family history of thyroid malignancy and erratic thyroid levels despite adequate supplementation. We also discussed performing a timed barium esophagagram with tablet, +/- pH testing, which she preferred to hold off for now. Monitor for improvement of GERD symptoms with initiation of bowel regimen.      RTC 2-3 months  Thank you for this consultation. It was a pleasure to participate in the care of this patient; please contact us with any further questions.    Belen Morales PA-C    32*minutes spent on the date of the encounter doing chart review, review of test results, patient visit and documentation

## 2021-06-10 NOTE — NURSING NOTE
Chief Complaint   Patient presents with     Follow Up       Vitals:    06/10/21 0859   Weight: 70.8 kg (156 lb)       Body mass index is 28.53 kg/m .    Digna Basilio CMA

## 2021-06-10 NOTE — LETTER
"    6/10/2021         RE: Mora Maldonado  [id number] [facility] [address]        Dear Colleague,    Thank you for referring your patient, Mora Maldonado, to the [facility] [place]. Please see a copy of my visit note below.    GI CLINIC VISIT    CC/REFERRING PROVIDER: Amrita Rose  REASON FOR CONSULTATION: abdominal pain    HPI: 26 year old female with PMH of anxiety, depression, hypothyroidism who is s/p cholecystectomy (2020) presenting to GI clinic for follow-up of abdominal pain and other symptoms.    Presented with RUQ pain in May 2020, with RUQ US showing cholelithiasis without sonographic evidence of acute cholecystitis. Same day CT AP was unremarkable. She underwent CCY for this. At time of GI consult, she reported ymptoms of abdominal cramping, bloating for one month, sporadic onset and also with meals, lasting up to 24 hours or more. Feels like organs are being \"twisted\" and \"stabbed\", \"brain fog\", feels tired, feels like skin is cold. If she eats gluten, she will get itchy scabs on her scalp. Pain can occur in generalized area or anywhere in the abdomen, LUQ. Postprandial nausea, 1-2 hours, tends to occur more at night or in the AM. Doesn't occur if she eats salad. No emesis. She reports acid reflux and heartburn after every meal. This is longstanding since childhood. She tried omeprazole 40 mg for about 50 days.Still adheres to GFD with strict adherence. She has dysphagia to dense solids with every food, will regurgitate acid or saliva. She feels like she also chokes on fluids. This is immediate with swallowing. Reported constipation since childhood. She can go once every 2 weeks. Last BM yesterday, previously 7 days ago, a/w straining. Hard, dry 80% of the time, 20% diarrhea. There is bright red blood coating the stool intermittently.    Interval history:  EGD/colonoscopy performed 6/3/2021, unremarkable endoscopically with excerption of a few small " fundic gland polyps. Biopsies negative for celiac disease. Colonic and TI biopsies unremarkable. Esophageal biopsies were not obtained.    Overall no change in symptoms from above. She felt like she cleared out with bowel prep for scope, but promptly returned to constipated stool pattern. There was some occasional mild straining even with loose stool. Continues to have intermittent dysphagia to saliva, solids, and liquids at level of thyroid cartilage.      FMHx - mom with RA, possible lupus. Grandma - multiple autoimmune conditions    ROS: 10pt ROS performed and otherwise negative.    PAST MEDICAL HISTORY:  Past Medical History:   Diagnosis Date     Abnormal Pap smear of cervix 09/03/2019    See problem list     Anxiety      Depressive disorder      Hypothyroidism      Thyroid disease        PREVIOUS ABDOMINAL/GYNECOLOGIC SURGERIES:    Past Surgical History:   Procedure Laterality Date     COLONOSCOPY N/A 6/3/2021    Procedure: COLONOSCOPY, WITH BIOPSY;  Surgeon: Lindy Garcia MD;  Location: [facility] [place]     ESOPHAGOSCOPY, GASTROSCOPY, DUODENOSCOPY (EGD), COMBINED N/A 6/3/2021    Procedure: ESOPHAGOGASTRODUODENOSCOPY, WITH BIOPSY;  Surgeon: Lindy Garcia MD;  Location: [facility] [place]     LAPAROSCOPIC CHOLECYSTECTOMY N/A 5/15/2020    Procedure: laparoscopic cholecystectomy;  Surgeon: Peter Hidalgo MD;  Location: [place] OR         PERTINENT MEDICATIONS:  Current Outpatient Medications   Medication Sig Dispense Refill     buPROPion (WELLBUTRIN XL) 150 MG 24 hr tablet Take 1 tablet (150 mg) by mouth every morning 30 tablet 0     escitalopram (LEXAPRO) 20 MG tablet Take 1 tablet (20 mg) by mouth daily 90 tablet 0     levonorgestrel (MIRENA) 20 MCG/24HR IUD 1 each (20 mcg) by Intrauterine route once       levothyroxine (SYNTHROID/LEVOTHROID) 112 MCG tablet Take 1 tablet (112 mcg) by mouth daily 90 tablet 1     phentermine (ADIPEX-P) 15 MG capsule Take 1 capsule (15 mg) by mouth every morning 30 capsule 0      SOCIAL HISTORY:  Social History     Socioeconomic History     Marital status: Single     Spouse name: Not on file     Number of children: Not on file     Years of education: Not on file     Highest education level: Not on file   Occupational History     Not on file   Social Needs     Financial resource strain: Not on file     Food insecurity     Worry: Not on file     Inability: Not on file     Transportation needs     Medical: Not on file     Non-medical: Not on file   Tobacco Use     Smoking status: Never Smoker     Smokeless tobacco: Never Used   Substance and Sexual Activity     Alcohol use: Yes     Frequency: Monthly or less     Comment: once a month     Drug use: Never     Sexual activity: Yes     Partners: Male     Birth control/protection: Implant   Lifestyle     Physical activity     Days per week: Not on file     Minutes per session: Not on file     Stress: Not on file   Relationships     Social connections     Talks on phone: Not on file     Gets together: Not on file     Attends Orthodoxy service: Not on file     Active member of club or organization: Not on file     Attends meetings of clubs or organizations: Not on file     Relationship status: Not on file     Intimate partner violence     Fear of current or ex partner: Not on file     Emotionally abused: Not on file     Physically abused: Not on file     Forced sexual activity: Not on file   Other Topics Concern     Parent/sibling w/ CABG, MI or angioplasty before 65F 55M? Not Asked   Social History Narrative     Not on file       FAMILY HISTORY:    Family History   Problem Relation Age of Onset     Rheumatoid Arthritis Mother      Thyroid Disease Maternal Grandmother      Hypertension Sister      Crohn's Disease No family hx of      Ulcerative Colitis No family hx of        PHYSICAL EXAMINATION:  Vitals reviewed  There were no vitals taken for this visit.  Video physical exam  General: Patient appears well in no acute distress.   Skin: No  visualized rash or lesions on visualized skin  Eyes: EOMI, no erythema, sclera icterus or discharge noted  Resp: Appears to be breathing comfortably without accessory muscle usage, speaking in full sentences, no cough  MSK: Appears to have normal range of motion based on visualized movements  Neurologic: No apparent tremors, facial movements symmetric  Psych: affect normal, alert and oriented    The rest of a comprehensive physical examination is deferred due to PHE (public health emergency) video restrictions      PERTINENT STUDIES Reviewed in EMR    ASSESSMENT/PLAN:    # Chronic constipation  # Abdominal pain, generalized  # Abdominal bloating  # Postprandial nausea   Longstanding constipation since childhood with bowel movement every one to two weeks, typically BSC 1, with ongoing symptoms despite thyroid replacement. Work-up with colonoscopy, celiac testing, inflammatory markers unremarkable. Differential includes slow transit vs CIC vs DGBI. There may be a component of pelvic floor dysfunction as well.     -- Trial of Miralax, titrate to effect, as per patient instructions  -- Add in soluble fiber supplement after 1-2 weeks  -- Referral to GI dietician  -- Future consideration for secretagogue, pelvic floor evaluation, referral to Dr. Cezar PsyD    # GERD  # Dysphagia   # Heartburn  GERD symptoms were non-responsive to a two month trial of omeprazole 40 mg daily. EGD was unrevealing for any structural etiologies. Biopsies were not obtained of the esophagus for evaluation of EoE. Discussed performing a thyroid US given family history of thyroid malignancy and erratic thyroid levels despite adequate supplementation. We also discussed performing a timed barium esophagagram with tablet, +/- pH testing, which she preferred to hold off for now. Monitor for improvement of GERD symptoms with initiation of bowel regimen.      RTC 2-3 months  Thank you for this consultation. It was a pleasure to participate in the care  "of this patient; please contact us with any further questions.    Belen Morales PA-C    32*minutes spent on the date of the encounter doing chart review, review of test results, patient visit and documentation      Mora Maldonado is a 26 year old female who is being evaluated via a billable video visit.      The patient has been notified of following:     \"This video visit will be conducted via a call between you and your physician/provider. We have found that certain health care needs can be provided without the need for an in-person physical exam.  This service lets us provide the care you need with a video conversation.  If a prescription is necessary we can send it directly to your pharmacy.  If lab work is needed we can place an order for that and you can then stop by our lab to have the test done at a later time.    If during the course of the call the physician/provider feels a video visit is not appropriate, you will not be charged for this service.\"     Patient confirmed that they are in [place] for today's visit yes.    Video-Visit Details  Type of service:  Video Visit    Video Start Time: 902  Video End Time:  922    Originating Location (pt. Location): Home    Distant Location (provider location):  [facility] [place]     Platform used: Ciplex                Again, thank you for allowing me to participate in the care of your patient.        Sincerely,        Belen Morales PA-C    "

## 2021-06-25 ENCOUNTER — ANCILLARY PROCEDURE (OUTPATIENT)
Dept: ULTRASOUND IMAGING | Facility: CLINIC | Age: 26
End: 2021-06-25
Attending: PHYSICIAN ASSISTANT
Payer: COMMERCIAL

## 2021-06-25 DIAGNOSIS — R13.10 DYSPHAGIA, UNSPECIFIED TYPE: ICD-10-CM

## 2021-06-25 PROCEDURE — 76536 US EXAM OF HEAD AND NECK: CPT | Performed by: RADIOLOGY

## 2021-06-28 DIAGNOSIS — F43.23 ADJUSTMENT DISORDER WITH MIXED ANXIETY AND DEPRESSED MOOD: ICD-10-CM

## 2021-06-29 RX ORDER — BUPROPION HYDROCHLORIDE 150 MG/1
150 TABLET ORAL EVERY MORNING
Qty: 30 TABLET | Refills: 0 | Status: SHIPPED | OUTPATIENT
Start: 2021-06-29 | End: 2021-07-26

## 2021-06-29 NOTE — TELEPHONE ENCOUNTER
"Requested Prescriptions   Pending Prescriptions Disp Refills     buPROPion (WELLBUTRIN XL) 150 MG 24 hr tablet 30 tablet 0     Sig: Take 1 tablet (150 mg) by mouth every morning       SSRIs Protocol Failed - 6/28/2021  1:56 PM        Failed - PHQ-9 score less than 5 in past 6 months     Please review last PHQ-9 score.           Failed - Recent (6 mo) or future (30 days) visit within the authorizing provider's specialty     Patient had office visit in the last 6 months or has a visit in the next 30 days with authorizing provider or within the authorizing provider's specialty.  See \"Patient Info\" tab in inbasket, or \"Choose Columns\" in Meds & Orders section of the refill encounter.            Passed - Medication is Bupropion     If the medication is Bupropion (Wellbutrin), and the patient is taking for smoking cessation; OK to refill.          Passed - Medication is active on med list        Passed - Patient is age 18 or older        Passed - No active pregnancy on record        Passed - No positive pregnancy test in last 12 months           Last Written Prescription Date:  6/4/2021  Last Fill Quantity: 30,  # refills: 0   Last office visit: 12/23/2020 with prescribing provider:  Rose   Future Office Visit:      PHQ-9 score:    PHQ 12/23/2020   PHQ-9 Total Score 20   Q9: Thoughts of better off dead/self-harm past 2 weeks Several days           Routing refill request to provider for review/approval because:  Patient needs to be seen because:  > 6 months since last visit.  Last PHQ-9 > 5  Nurse sent my chart message to schedule appointment and also attached PHQ-9 for patient to complete.      Jen Hicks RN  [facility]                   "

## 2021-07-18 ENCOUNTER — LAB (OUTPATIENT)
Dept: LAB | Facility: CLINIC | Age: 26
End: 2021-07-18
Payer: COMMERCIAL

## 2021-07-18 DIAGNOSIS — R13.10 DYSPHAGIA, UNSPECIFIED TYPE: ICD-10-CM

## 2021-07-18 DIAGNOSIS — E03.9 HYPOTHYROIDISM, UNSPECIFIED TYPE: ICD-10-CM

## 2021-07-18 DIAGNOSIS — K21.9 GASTROESOPHAGEAL REFLUX DISEASE, UNSPECIFIED WHETHER ESOPHAGITIS PRESENT: ICD-10-CM

## 2021-07-18 DIAGNOSIS — R11.0 POSTPRANDIAL NAUSEA: ICD-10-CM

## 2021-07-18 DIAGNOSIS — R10.9 RECURRENT ABDOMINAL PAIN: ICD-10-CM

## 2021-07-18 LAB
BASOPHILS # BLD AUTO: 0.1 10E3/UL (ref 0–0.2)
BASOPHILS NFR BLD AUTO: 1 %
CRP SERPL-MCNC: <2.9 MG/L (ref 0–8)
EOSINOPHIL # BLD AUTO: 0.2 10E3/UL (ref 0–0.7)
EOSINOPHIL NFR BLD AUTO: 3 %
ERYTHROCYTE [DISTWIDTH] IN BLOOD BY AUTOMATED COUNT: 12.7 % (ref 10–15)
ERYTHROCYTE [SEDIMENTATION RATE] IN BLOOD BY WESTERGREN METHOD: 9 MM/HR (ref 0–20)
HCT VFR BLD AUTO: 42.3 % (ref 35–47)
HGB BLD-MCNC: 14.6 G/DL (ref 11.7–15.7)
IMM GRANULOCYTES # BLD: 0 10E3/UL
IMM GRANULOCYTES NFR BLD: 1 %
LYMPHOCYTES # BLD AUTO: 2.2 10E3/UL (ref 0.8–5.3)
LYMPHOCYTES NFR BLD AUTO: 35 %
MCH RBC QN AUTO: 29.3 PG (ref 26.5–33)
MCHC RBC AUTO-ENTMCNC: 34.5 G/DL (ref 31.5–36.5)
MCV RBC AUTO: 85 FL (ref 78–100)
MONOCYTES # BLD AUTO: 0.4 10E3/UL (ref 0–1.3)
MONOCYTES NFR BLD AUTO: 7 %
NEUTROPHILS # BLD AUTO: 3.4 10E3/UL (ref 1.6–8.3)
NEUTROPHILS NFR BLD AUTO: 53 %
NRBC # BLD AUTO: 0 10E3/UL
NRBC BLD AUTO-RTO: 0 /100
PLATELET # BLD AUTO: 205 10E3/UL (ref 150–450)
RBC # BLD AUTO: 4.99 10E6/UL (ref 3.8–5.2)
T3FREE SERPL-MCNC: 2.2 PG/ML (ref 2.3–4.2)
T4 FREE SERPL-MCNC: 0.96 NG/DL (ref 0.76–1.46)
TSH SERPL DL<=0.005 MIU/L-ACNC: 1.34 MU/L (ref 0.4–4)
WBC # BLD AUTO: 6.2 10E3/UL (ref 4–11)

## 2021-07-18 PROCEDURE — 83516 IMMUNOASSAY NONANTIBODY: CPT

## 2021-07-18 PROCEDURE — 85025 COMPLETE CBC W/AUTO DIFF WBC: CPT

## 2021-07-18 PROCEDURE — 82784 ASSAY IGA/IGD/IGG/IGM EACH: CPT

## 2021-07-18 PROCEDURE — 84443 ASSAY THYROID STIM HORMONE: CPT

## 2021-07-18 PROCEDURE — 84481 FREE ASSAY (FT-3): CPT

## 2021-07-18 PROCEDURE — 85652 RBC SED RATE AUTOMATED: CPT

## 2021-07-18 PROCEDURE — 36415 COLL VENOUS BLD VENIPUNCTURE: CPT

## 2021-07-18 PROCEDURE — 86140 C-REACTIVE PROTEIN: CPT

## 2021-07-18 PROCEDURE — 84439 ASSAY OF FREE THYROXINE: CPT

## 2021-07-19 LAB
IGA SERPL-MCNC: 135 MG/DL (ref 84–499)
TTG IGA SER-ACNC: 0.3 U/ML
TTG IGG SER-ACNC: <0.6 U/ML

## 2021-07-21 DIAGNOSIS — E03.9 HYPOTHYROIDISM, UNSPECIFIED TYPE: Primary | ICD-10-CM

## 2021-07-21 RX ORDER — LEVOTHYROXINE SODIUM 125 UG/1
125 TABLET ORAL DAILY
Qty: 30 TABLET | Refills: 11 | Status: SHIPPED | OUTPATIENT
Start: 2021-07-21 | End: 2022-06-02

## 2021-07-26 DIAGNOSIS — F43.23 ADJUSTMENT DISORDER WITH MIXED ANXIETY AND DEPRESSED MOOD: ICD-10-CM

## 2021-07-26 RX ORDER — BUPROPION HYDROCHLORIDE 150 MG/1
150 TABLET ORAL EVERY MORNING
Qty: 30 TABLET | Refills: 0 | Status: SHIPPED | OUTPATIENT
Start: 2021-07-26 | End: 2021-09-01

## 2021-09-01 DIAGNOSIS — F43.23 ADJUSTMENT DISORDER WITH MIXED ANXIETY AND DEPRESSED MOOD: ICD-10-CM

## 2021-09-01 RX ORDER — BUPROPION HYDROCHLORIDE 150 MG/1
150 TABLET ORAL EVERY MORNING
Qty: 30 TABLET | Refills: 0 | Status: SHIPPED | OUTPATIENT
Start: 2021-09-01 | End: 2021-09-28

## 2021-09-02 ENCOUNTER — OFFICE VISIT (OUTPATIENT)
Dept: URGENT CARE | Facility: URGENT CARE | Age: 26
End: 2021-09-02
Payer: COMMERCIAL

## 2021-09-02 VITALS
HEART RATE: 95 BPM | OXYGEN SATURATION: 100 % | SYSTOLIC BLOOD PRESSURE: 127 MMHG | TEMPERATURE: 98.8 F | DIASTOLIC BLOOD PRESSURE: 87 MMHG

## 2021-09-02 DIAGNOSIS — Z20.822 SUSPECTED COVID-19 VIRUS INFECTION: ICD-10-CM

## 2021-09-02 DIAGNOSIS — R05.9 COUGH: Primary | ICD-10-CM

## 2021-09-02 DIAGNOSIS — B34.9 VIRAL SYNDROME: ICD-10-CM

## 2021-09-02 LAB
DEPRECATED S PYO AG THROAT QL EIA: NEGATIVE
GROUP A STREP BY PCR: NOT DETECTED

## 2021-09-02 PROCEDURE — U0005 INFEC AGEN DETEC AMPLI PROBE: HCPCS | Performed by: NURSE PRACTITIONER

## 2021-09-02 PROCEDURE — 87651 STREP A DNA AMP PROBE: CPT | Performed by: NURSE PRACTITIONER

## 2021-09-02 PROCEDURE — U0003 INFECTIOUS AGENT DETECTION BY NUCLEIC ACID (DNA OR RNA); SEVERE ACUTE RESPIRATORY SYNDROME CORONAVIRUS 2 (SARS-COV-2) (CORONAVIRUS DISEASE [COVID-19]), AMPLIFIED PROBE TECHNIQUE, MAKING USE OF HIGH THROUGHPUT TECHNOLOGIES AS DESCRIBED BY CMS-2020-01-R: HCPCS | Performed by: NURSE PRACTITIONER

## 2021-09-02 PROCEDURE — 99213 OFFICE O/P EST LOW 20 MIN: CPT | Performed by: NURSE PRACTITIONER

## 2021-09-02 ASSESSMENT — PAIN SCALES - GENERAL: PAINLEVEL: MODERATE PAIN (4)

## 2021-09-02 NOTE — LETTER
September 2, 2021      Mora Maldonado  [id number] [address] [facility] [id number]        To Whom It May Concern:    Mora Maldonado was seen in our clinic. Please excuse from work for the next 9 days. She may return when she is fever free for 24 hours without medication and when she is symptom free.      Sincerely,        SHAWNEE Luke CNP

## 2021-09-03 LAB — SARS-COV-2 RNA RESP QL NAA+PROBE: NEGATIVE

## 2021-09-03 NOTE — RESULT ENCOUNTER NOTE
Group A Streptococcus PCR is NEGATIVE  No treatment or change in treatment [facility] ED lab result Strep Group A protocol.

## 2021-09-03 NOTE — PATIENT INSTRUCTIONS
"Increase rest and fluids. Tylenol and/or Ibuprofen for comfort.  If your symptoms worsen or do not resolve follow up with your primary care provider in 1 week and sooner if needed.      self isolate for a total of 10 days even if test is negative. You may return to work when fever free for 24 hours without fever reducing meds and you are symptom free.  Indications for emergent return to emergency department discussed with patient, who verbalized good understanding and agreement.  Patient understands the limitations of today's evaluation.           Patient Education     Viral Syndrome (Adult)  A viral illness may cause a number of symptoms such as fever. Other symptoms depend on the part of the body that the virus affects. If it settles in your nose, throat, and lungs, it may cause cough, sore throat, congestion, runny nose, headache, earache and other ear symptoms, or shortness of breath. If it settles in your stomach and intestinal tract, it may cause nausea, vomiting, cramping, and diarrhea. Sometimes it causes generalized symptoms like \"aching all over,\" feeling tired, loss of energy, or loss of appetite.  A viral illness usually lasts anywhere from several days to several weeks, but sometimes it lasts longer. In some cases, a more serious infection can look like a viral syndrome in the first few days of the illness. You may need another exam and additional tests to know the difference. Watch for the warning signs listed below for when to seek medical advice.  Home care  Follow these guidelines for taking care of yourself at home:    If symptoms are severe, rest at home for the first 2 to 3 days.    Stay away from cigarette smoke - both your smoke and the smoke from others.    You may use over-the-counter acetaminophen or ibuprofen for fever, muscle aching, and headache, unless another medicine was prescribed for this. If you have chronic liver or kidney disease or ever had a stomach ulcer or gastrointestinal " bleeding, talk with your healthcare provider before using these medicines. No one who is younger than 18 and ill with a fever should take aspirin. It may cause severe disease or death.    Your appetite may be poor, so a light diet is fine. Avoid dehydration by drinking 8 to 12, 8-ounce glasses of fluids each day. This may include water; orange juice; lemonade; apple, grape, and cranberry juice; clear fruit drinks; electrolyte replacement and sports drinks; and decaffeinated teas and coffee. If you have been diagnosed with a kidney disease, ask your healthcare provider how much and what types of fluids you should drink to prevent dehydration. If you have kidney disease, drinking too much fluid can cause it build up in the your body and be dangerous to your health.    Over-the-counter remedies won't shorten the length of the illness but may be helpful for symptoms such as cough, sore throat, nasal and sinus congestion, or diarrhea. Don't use decongestants if you have high blood pressure.  Follow-up care  Follow up with your healthcare provider if you do not improve over the next week.  Call 911  Call 911 if any of the following occur:    Convulsion    Feeling weak, dizzy, or like you are going to faint    Chest pain, or more than mild shortness of breath  When to seek medical advice  Call your healthcare provider right away if any of these occur:    Cough with lots of colored sputum (mucus) or blood in your sputum    Chest pain, shortness of breath, wheezing, or trouble breathing    Severe headache; face, neck, or ear pain    Severe, constant pain in the lower right side of your belly (abdominal)    Continued vomiting (can t keep liquids down)    Frequent diarrhea (more than 5 times a day); blood (red or black color) or mucus in diarrhea    Feeling weak, dizzy, or like you are going to faint    Extreme thirst    Fever of 100.4 F (38 C) or higher, or as directed by your healthcare provider  [employer]ah last reviewed this  educational content on 4/1/2018 2000-2021 [employer]. All rights reserved. This information is not intended as a substitute for professional medical care. Always follow your healthcare professional's instructions.           Patient Education     Coronavirus Disease 2019 (COVID-19): Caring for Yourself or Others   If you or a household member have symptoms of COVID-19, follow the guidelines below. This will help you manage symptoms and keep the virus from spreading.  If you have symptoms of COVID-19    Stay home and contact your care team. They will tell you what to do. You may be told to stay home and away from others (self-isolate). You may also be told to stay at least 6 feet from others (social distancing).    Stay away from work, school, and public places.    Limit physical contact with others in your home. Limit visitors. No kissing.  Clean surfaces you touch with disinfectant.  If you need to cough or sneeze, do it into a tissue. Then throw the tissue into the trash. If you don't have tissues, cough or sneeze into the bend of your elbow.  Don t share food or personal items with people in your household. This includes items like eating and drinking utensils, towels, and bedding.  Wear a cloth face mask around other people. During a public health emergency, medical face masks may be reserved for healthcare workers. You may need to make a cloth face mask of your own. You can do this using a bandana, T-shirt, or other cloth. The CDC has instructions on how to make a face mask. Wear the mask so that it covers both your nose and mouth.  If you need to go to a hospital or clinic, call ahead to let them know. Expect the care team to wear masks, gowns, gloves, and eye protection. You may need to come to a different entrance or wait in a separate room.  Follow all instructions from your care team.    If you ve been diagnosed with COVID-19    Stay home and away from others, including other people in  your home. (This is called self-isolation.) Don t leave home unless you need to get medical care. Don t go to work, school, or public places. Don t use buses, taxis, or other public transportation.    Follow all instructions from your care team.    If you need to go to a hospital or clinic, call ahead to let them know. Expect the care team to wear masks, gowns, gloves, and eye protection. You may need to come to a different entrance or wait in a separate room.    Wear a face mask over your nose and mouth. This is to protect others from your germs. If you can t wear a mask, your caregivers should wear one. You may need to make your own mask using a bandana, T-shirt, or other cloth. See the CDC s instructions on how to make a face mask.    Have no contact with pets and other animals.    Don t share food or personal items with people in your household. This includes items like eating and drinking utensils, towels, and bedding.    If you need to cough or sneeze, do it into a tissue. Then throw the tissue into the trash. If you don't have tissues, cough or sneeze into the bend of your elbow.    Wash your hands often.    Self-care at home  The FDA has approved an antiviral medicine (called remdesivir) for people being treated in the hospital. This is for people 12 years and older who weigh more than about 88 pounds (40 kgs). In certain cases, it may also be used for people younger than 12 years or who weigh less than about 88 pounds (40 kgs)..  Currently, treatment is mostly aimed at helping your body while it fights the virus.    Getting extra rest.    Drink extra fluids 6 to 8 glasses of liquids each day), unless a doctor has told you not to. Ask your care team which fluids are best for you. Avoid fluids that contain caffeine or alcohol.    Taking over-the-counter (OTC) medicine to reduce pain and fever. Your care team will tell you which medicine to use.  If you ve been in the hospital for COVID-19, follow your care  team s instructions. They will tell you when to stop self-isolation. They may also have you change positions to help your breathing (such as lying on your belly).  If a test showed that you have COVID-19, you may be asked to donate plasma after you ve recovered. (This is called COVID-19 convalescent plasma donation.) The plasma may contain antibodies to help fight the virus in others. Experts don't know the safety of plasma or how well it works. Research continues, and the FDA has approved it for emergency use in certain people with serious or life-threatening COVID-19.  Caring for a sick person     Follow all instructions from the care team.    Wash your hands often.    Wear protective clothing as advised.    Make sure the sick person wears a mask. If they can't wear a mask, don t stay in the same room with them. If you must be in the same room, wear a face mask. Make sure the mask covers both the nose and mouth.    Keep track of the sick person s symptoms.    Clean surfaces often with disinfectant. This includes phones, kitchen counters, fridge door handles, bathroom surfaces, and others.    Don t let anyone share household items with the sick person. This includes eating and drinking tools, towels, sheets, or blankets.    Clean fabrics and laundry well.    Keep other people and pets away from the sick person.    When you can stop self-isolation  When you are sick with COVID-19, you should stay away from other people. This is called self-isolation. The rules for ending self-isolation depend on your health in general.  If you are normally healthy:  You can stop self-isolation when all 3 of these are true:    You ve had no fever--and no medicine that reduces fever--for at least 24 hours. And     Your symptoms are better, such as cough or trouble breathing. And     At least 10 days have passed since your symptoms first started.  Talk with your care team before you leave home. They may tell you it s okay to leave, or  they may give you different advice. You do not need to be re-tested.  If you have a weak immune system, or you ve had severe COVID-19:  Follow your care team s instructions. You may be asked to self-isolate for 10 days to 20 days after your symptoms first started. Your care team may want to re-test you for COVID-19.  Note: If you re being treated for cancer, have an immune disorder (such as HIV), or have had a transplant (organ or bone marrow), you may have a weak immune system.  If you've already had COVID-19 once:  If you had the virus over 3 months ago, and you ve been exposed again, treat it like you've never had COVID-19. Stay home, limit your contact with others, call your care team, and watch for symptoms.  If it s been less than 3 months since you had the virus, you re symptom-free, and you ve been exposed again: You don t need to self-isolate. You don t need to be re-tested, unless you have new symptoms of COVID-19 that can t be linked to another illness. But if you develop new symptoms, stay home. Contact your care team if you have questions.  When you return to public settings  When you are well enough to go outside your home, follow the CDC s guidance on cloth face masks.    Anyone over age 2 should wear a face mask in public, especially when it's hard to socially distance. This includes public transit, public protests and marches, and crowded stores, bars, and restaurants.    Face masks are most likely to reduce the spread of COVID-19 when they are widely used by people who are out in the public.  Certain people should not wear a face covering. These include:    Children under 2 years old    Anyone with a health, developmental, or mental health condition that can be made worse by wearing a mask    Anyone who is unconscious or unable to remove the face covering without help. See the CDC's guidance on who should not wear a face mask.  When to call your care team  Call your care team right away if a sick  person has any of these:    Trouble breathing    Pain or pressure in chest  If a sick person has any of these, call 911:    Trouble breathing that gets worse    Pain or pressure in chest that gets worse    Blue tint to lips or face    Fast or irregular heartbeat    Confusion or trouble waking    Fainting or loss of consciousness    Coughing up blood  Going home from the hospital   If you have COVID-19 and were recently in the hospital:    Follow the instructions above for self-care and isolation.    Follow the hospital care team s instructions.    Ask questions if anything is unclear to you. Write down answers so you remember them.  Date last modified: 1/15/2021  [employer] last reviewed this educational content on 4/1/2020  This information has been modified by your health care provider with permission from the publisher.    9012-6041 [employer]. [address] [place] [address], [place] [address]. All rights reserved. This information is not intended as a substitute for professional medical care. Always follow your healthcare professional's instructions.

## 2021-09-18 ENCOUNTER — PATIENT OUTREACH (OUTPATIENT)
Dept: GASTROENTEROLOGY | Facility: CLINIC | Age: 26
End: 2021-09-18

## 2021-09-18 NOTE — PROGRESS NOTES
Attempted to reach patient to schedule follow up in the [facility].  No answer,  LM on VM to call office and [employer] message sent.    Schedule with KAYODE Kenney.

## 2021-09-27 DIAGNOSIS — F43.23 ADJUSTMENT DISORDER WITH MIXED ANXIETY AND DEPRESSED MOOD: ICD-10-CM

## 2021-09-28 RX ORDER — BUPROPION HYDROCHLORIDE 150 MG/1
150 TABLET ORAL EVERY MORNING
Qty: 30 TABLET | Refills: 0 | Status: SHIPPED | OUTPATIENT
Start: 2021-09-28 | End: 2021-10-27

## 2021-10-12 ENCOUNTER — APPOINTMENT (OUTPATIENT)
Dept: URBAN - METROPOLITAN AREA CLINIC 252 | Age: 26
Setting detail: DERMATOLOGY
End: 2021-10-17

## 2021-10-12 VITALS — HEIGHT: 62 IN | RESPIRATION RATE: 18 BRPM | WEIGHT: 152 LBS

## 2021-10-12 DIAGNOSIS — R21 RASH AND OTHER NONSPECIFIC SKIN ERUPTION: ICD-10-CM

## 2021-10-12 DIAGNOSIS — L70.0 ACNE VULGARIS: ICD-10-CM

## 2021-10-12 PROCEDURE — OTHER PRESCRIPTION: OTHER

## 2021-10-12 PROCEDURE — OTHER URINE PREGNANCY TEST: OTHER

## 2021-10-12 PROCEDURE — OTHER ADDITIONAL NOTES: OTHER

## 2021-10-12 PROCEDURE — 81025 URINE PREGNANCY TEST: CPT

## 2021-10-12 PROCEDURE — 99204 OFFICE O/P NEW MOD 45 MIN: CPT

## 2021-10-12 PROCEDURE — OTHER PRESCRIPTION MEDICATION MANAGEMENT: OTHER

## 2021-10-12 PROCEDURE — OTHER COUNSELING: OTHER

## 2021-10-12 PROCEDURE — OTHER ISOTRETINOIN INITIATION: OTHER

## 2021-10-12 RX ORDER — MINOCYCLINE HYDROCHLORIDE 100 MG/1
100MG CAPSULE ORAL BID
Qty: 42 | Refills: 0 | Status: ERX | COMMUNITY
Start: 2021-10-12

## 2021-10-12 RX ORDER — CLINDAMYCIN PHOSPHATE 10 MG/G
1% GEL TOPICAL QAM
Qty: 60 | Refills: 6 | Status: ERX | COMMUNITY
Start: 2021-10-12

## 2021-10-12 ASSESSMENT — LOCATION DETAILED DESCRIPTION DERM
LOCATION DETAILED: RIGHT SUPERIOR MEDIAL UPPER BACK
LOCATION DETAILED: LEFT CENTRAL MALAR CHEEK
LOCATION DETAILED: LEFT ANTERIOR PROXIMAL THIGH

## 2021-10-12 ASSESSMENT — LOCATION SIMPLE DESCRIPTION DERM
LOCATION SIMPLE: RIGHT UPPER BACK
LOCATION SIMPLE: LEFT CHEEK
LOCATION SIMPLE: LEFT THIGH

## 2021-10-12 ASSESSMENT — LOCATION ZONE DERM
LOCATION ZONE: FACE
LOCATION ZONE: LEG
LOCATION ZONE: TRUNK

## 2021-10-12 NOTE — HPI: PIMPLES (ACNE)
What Type Of Note Output Would You Prefer (Optional)?: Bullet Format
How Severe Is Your Acne?: moderate
Is This A New Presentation, Or A Follow-Up?: Acne
Additional Comments (Use Complete Sentences): “Skin ”\\nTested for celiac disease negative\\nDenies anxiety or depression \\n

## 2021-10-12 NOTE — PROCEDURE: PRESCRIPTION MEDICATION MANAGEMENT
Detail Level: Zone
Initiate Treatment: Gluten reduction or elimination diet
Render In Strict Bullet Format?: No

## 2021-10-12 NOTE — PROCEDURE: COUNSELING
Patient Specific Counseling (Will Not Stick From Patient To Patient): Discussed that it could be dermatitis herpetiformis, a gluten related rash.  Discussed that it’s difficult to determine as all the areas are excoriated or in healing phases.  Recommend that she return to clinic during a fresh layer and we will biopsy.
Detail Level: Zone

## 2021-10-12 NOTE — PROCEDURE: ADDITIONAL NOTES
Additional Notes: ***call and make an appt for a same day appointment for a biopsy when she flares
Render Risk Assessment In Note?: no
Additional Notes: - Will need 8-hour fasting baseline lab work at next office visit.
Detail Level: Simple

## 2021-10-25 DIAGNOSIS — F43.23 ADJUSTMENT DISORDER WITH MIXED ANXIETY AND DEPRESSED MOOD: ICD-10-CM

## 2021-10-27 RX ORDER — BUPROPION HYDROCHLORIDE 150 MG/1
150 TABLET ORAL EVERY MORNING
Qty: 30 TABLET | Refills: 0 | Status: SHIPPED | OUTPATIENT
Start: 2021-10-27 | End: 2021-11-12

## 2021-10-27 NOTE — TELEPHONE ENCOUNTER
Wellbutrin XL  Routing refill request to provider for review/approval because:  PHQ9 score failed RN refill protocol    Becki Gaming RN

## 2021-11-09 DIAGNOSIS — F43.23 ADJUSTMENT DISORDER WITH MIXED ANXIETY AND DEPRESSED MOOD: ICD-10-CM

## 2021-11-12 RX ORDER — BUPROPION HYDROCHLORIDE 150 MG/1
TABLET ORAL
Qty: 90 TABLET | Refills: 1 | Status: SHIPPED | OUTPATIENT
Start: 2021-11-12 | End: 2022-06-01

## 2021-11-12 NOTE — TELEPHONE ENCOUNTER
Pending Prescriptions:                       Disp   Refills    buPROPion (WELLBUTRIN XL) 150 MG 24 hr tab*90 tab*1        Sig: TAKE 1 TABLET BY MOUTH EVERY MORNING    Routing refill request to provider for review/approval because:  Labs not current:  PHQ-9 score:    PHQ 6/29/2021   PHQ-9 Total Score 8   Q9: Thoughts of better off dead/self-harm past 2 weeks Not at all

## 2021-11-18 ENCOUNTER — OFFICE VISIT (OUTPATIENT)
Dept: FAMILY MEDICINE | Facility: CLINIC | Age: 26
End: 2021-11-18
Payer: COMMERCIAL

## 2021-11-18 VITALS
WEIGHT: 171 LBS | HEART RATE: 95 BPM | TEMPERATURE: 98.1 F | OXYGEN SATURATION: 98 % | SYSTOLIC BLOOD PRESSURE: 100 MMHG | BODY MASS INDEX: 31.28 KG/M2 | DIASTOLIC BLOOD PRESSURE: 75 MMHG

## 2021-11-18 DIAGNOSIS — Z23 HIGH PRIORITY FOR 2019-NCOV VACCINE: ICD-10-CM

## 2021-11-18 DIAGNOSIS — R41.840 CONCENTRATION DEFICIT: ICD-10-CM

## 2021-11-18 DIAGNOSIS — F43.23 ADJUSTMENT DISORDER WITH MIXED ANXIETY AND DEPRESSED MOOD: Primary | ICD-10-CM

## 2021-11-18 DIAGNOSIS — E66.811 CLASS 1 OBESITY WITHOUT SERIOUS COMORBIDITY IN ADULT, UNSPECIFIED BMI, UNSPECIFIED OBESITY TYPE: ICD-10-CM

## 2021-11-18 PROCEDURE — 0064A COVID-19,PF,MODERNA (18+ YRS BOOSTER .25ML): CPT | Performed by: PHYSICIAN ASSISTANT

## 2021-11-18 PROCEDURE — 99214 OFFICE O/P EST MOD 30 MIN: CPT | Performed by: PHYSICIAN ASSISTANT

## 2021-11-18 PROCEDURE — 91306 COVID-19,PF,MODERNA (18+ YRS BOOSTER .25ML): CPT | Performed by: PHYSICIAN ASSISTANT

## 2021-11-18 RX ORDER — PHENTERMINE HYDROCHLORIDE 15 MG/1
15 CAPSULE ORAL EVERY MORNING
Qty: 30 CAPSULE | Refills: 2 | Status: SHIPPED | OUTPATIENT
Start: 2021-11-18 | End: 2022-06-02

## 2021-11-18 RX ORDER — ESCITALOPRAM OXALATE 20 MG/1
20 TABLET ORAL DAILY
Qty: 90 TABLET | Refills: 0 | Status: SHIPPED | OUTPATIENT
Start: 2021-11-18 | End: 2021-11-20

## 2021-11-18 ASSESSMENT — ANXIETY QUESTIONNAIRES
7. FEELING AFRAID AS IF SOMETHING AWFUL MIGHT HAPPEN: MORE THAN HALF THE DAYS
2. NOT BEING ABLE TO STOP OR CONTROL WORRYING: NOT AT ALL
1. FEELING NERVOUS, ANXIOUS, OR ON EDGE: SEVERAL DAYS
6. BECOMING EASILY ANNOYED OR IRRITABLE: MORE THAN HALF THE DAYS
5. BEING SO RESTLESS THAT IT IS HARD TO SIT STILL: MORE THAN HALF THE DAYS
GAD7 TOTAL SCORE: 9
IF YOU CHECKED OFF ANY PROBLEMS ON THIS QUESTIONNAIRE, HOW DIFFICULT HAVE THESE PROBLEMS MADE IT FOR YOU TO DO YOUR WORK, TAKE CARE OF THINGS AT HOME, OR GET ALONG WITH OTHER PEOPLE: SOMEWHAT DIFFICULT
3. WORRYING TOO MUCH ABOUT DIFFERENT THINGS: NOT AT ALL

## 2021-11-18 ASSESSMENT — PATIENT HEALTH QUESTIONNAIRE - PHQ9: 5. POOR APPETITE OR OVEREATING: MORE THAN HALF THE DAYS

## 2021-11-18 ASSESSMENT — PAIN SCALES - GENERAL: PAINLEVEL: NO PAIN (0)

## 2021-11-18 NOTE — PROGRESS NOTES
Tomasz Velazco is a 26 year old who presents for the following health issues    HPI     Depression and Anxiety Follow-Up    How are you doing with your depression since your last visit? No change    How are you doing with your anxiety since your last visit?  No change    Are you having other symptoms that might be associated with depression or anxiety? No    Have you had a significant life event? No     Do you have any concerns with your use of alcohol or other drugs? No     Patient presents today for follow-up of anxiety and depression. She reports this is the best she has ever felt - finally feels like herself. She feels medication is working very well. Feels well balanced. Notes that really any symptoms are more situational. She is working full time and taking her master's in early childhood education and special ed. She does note difficulty concentrating and staying on task at times. Has a hard time with any reading assignments. Interested in being tested for ADHD.    Would like to get back on Phentermine to help decrease weight. Felt when she used this in the past it really helped curb appetite.     Social History     Tobacco Use     Smoking status: Never Smoker     Smokeless tobacco: Never Used   Vaping Use     Vaping Use: Never used   Substance Use Topics     Alcohol use: Yes     Comment: once a month     Drug use: Never     PHQ 12/23/2020 6/29/2021 11/18/2021   PHQ-9 Total Score 20 8 11   Q9: Thoughts of better off dead/self-harm past 2 weeks Several days Not at all Not at all     MOHINDER-7 SCORE 4/10/2020 12/23/2020 11/18/2021   Total Score 15 21 9     Last PHQ-9 11/18/2021   1.  Little interest or pleasure in doing things 1   2.  Feeling down, depressed, or hopeless 0   3.  Trouble falling or staying asleep, or sleeping too much 3   4.  Feeling tired or having little energy 1   5.  Poor appetite or overeating 1   6.  Feeling bad about yourself 1   7.  Trouble concentrating 2   8.  Moving slowly or  restless 2   Q9: Thoughts of better off dead/self-harm past 2 weeks 0   PHQ-9 Total Score 11   Difficulty at work, home, or with people Somewhat difficult     MOHINDER-7  11/18/2021   1. Feeling nervous, anxious, or on edge 1   2. Not being able to stop or control worrying 0   3. Worrying too much about different things 0   4. Trouble relaxing 2   5. Being so restless that it is hard to sit still 2   6. Becoming easily annoyed or irritable 2   7. Feeling afraid, as if something awful might happen 2   MOHINDER-7 Total Score 9   If you checked any problems, how difficult have they made it for you to do your work, take care of things at home, or get along with other people? Somewhat difficult       Review of Systems   Constitutional, HEENT, cardiovascular, pulmonary, GI, , musculoskeletal, neuro, skin, endocrine and psych systems are negative, except as otherwise noted.      Objective    /75   Pulse 95   Temp 98.1  F (36.7  C) (Temporal)   Wt 77.6 kg (171 lb)   SpO2 98%   BMI 31.28 kg/m    Body mass index is 31.28 kg/m .  Physical Exam   GENERAL: healthy, alert and no distress  SKIN: no suspicious lesions or rashes  PSYCH: mentation appears normal, affect normal/bright      Assessment & Plan     Adjustment disorder with mixed anxiety and depressed mood  Patient overall doing very well on current dose of Wellbutrin. Feels very well balanced. Denies any bothersome symptoms.    Concentration deficit  Questions ADD given difficulty concentrating at times and is interested in testing. Referral placed as below.   - MENTAL HEALTH REFERRAL  - Adult; Assessments and Testing; ADHD; [facility] [phone]; We will contact you to schedule the appointment or please call with any questions; Future    Class 1 obesity without serious comorbidity in adult, unspecified BMI, unspecified obesity type  Patient previously well tolerated Phentermine and was able to lose weight. Restarted today. Patient has no  cardiovascular risks. Encouraged ongoing diet and exercise modifications.  - phentermine (ADIPEX-P) 15 MG capsule; Take 1 capsule (15 mg) by mouth every morning    High priority for 2019-nCoV vaccine  - COVID-19,PF,MODERNA (18+ Yrs BOOSTER .25mL)    The patient indicates understanding of these issues and agrees with the plan.    ALIN Abreu [facility]

## 2021-11-19 ASSESSMENT — ANXIETY QUESTIONNAIRES: GAD7 TOTAL SCORE: 9

## 2021-11-19 ASSESSMENT — PATIENT HEALTH QUESTIONNAIRE - PHQ9: SUM OF ALL RESPONSES TO PHQ QUESTIONS 1-9: 11

## 2021-11-24 ENCOUNTER — APPOINTMENT (OUTPATIENT)
Dept: URBAN - METROPOLITAN AREA CLINIC 252 | Age: 26
Setting detail: DERMATOLOGY
End: 2021-11-29

## 2021-11-24 VITALS — WEIGHT: 165 LBS | RESPIRATION RATE: 14 BRPM | HEIGHT: 62 IN

## 2021-11-24 DIAGNOSIS — B36.8 OTHER SPECIFIED SUPERFICIAL MYCOSES: ICD-10-CM

## 2021-11-24 DIAGNOSIS — L70.0 ACNE VULGARIS: ICD-10-CM

## 2021-11-24 PROCEDURE — 99214 OFFICE O/P EST MOD 30 MIN: CPT

## 2021-11-24 PROCEDURE — OTHER COUNSELING: OTHER

## 2021-11-24 PROCEDURE — OTHER URINE PREGNANCY TEST: OTHER

## 2021-11-24 PROCEDURE — OTHER PRESCRIPTION: OTHER

## 2021-11-24 PROCEDURE — OTHER ISOTRETINOIN INITIATION: OTHER

## 2021-11-24 PROCEDURE — OTHER ADDITIONAL NOTES: OTHER

## 2021-11-24 PROCEDURE — 81025 URINE PREGNANCY TEST: CPT

## 2021-11-24 RX ORDER — ISOTRETINOIN 20 MG/1
20MG CAPSULE, LIQUID FILLED ORAL
Qty: 30 | Refills: 0 | Status: ERX | COMMUNITY
Start: 2021-11-24

## 2021-11-24 RX ORDER — KETOCONAZOLE 20 MG/ML
2% SHAMPOO, SUSPENSION TOPICAL BIW
Qty: 120 | Refills: 2 | Status: ERX | COMMUNITY
Start: 2021-11-24

## 2021-11-24 ASSESSMENT — LOCATION DETAILED DESCRIPTION DERM
LOCATION DETAILED: UPPER STERNUM
LOCATION DETAILED: LEFT CENTRAL MALAR CHEEK
LOCATION DETAILED: PERIUMBILICAL SKIN
LOCATION DETAILED: RIGHT INFERIOR ANTERIOR NECK

## 2021-11-24 ASSESSMENT — LOCATION ZONE DERM
LOCATION ZONE: TRUNK
LOCATION ZONE: FACE
LOCATION ZONE: NECK

## 2021-11-24 ASSESSMENT — LOCATION SIMPLE DESCRIPTION DERM
LOCATION SIMPLE: LEFT CHEEK
LOCATION SIMPLE: CHEST
LOCATION SIMPLE: RIGHT ANTERIOR NECK
LOCATION SIMPLE: ABDOMEN

## 2021-12-19 ENCOUNTER — HEALTH MAINTENANCE LETTER (OUTPATIENT)
Age: 26
End: 2021-12-19

## 2022-01-04 ENCOUNTER — APPOINTMENT (OUTPATIENT)
Dept: URBAN - METROPOLITAN AREA CLINIC 252 | Age: 27
Setting detail: DERMATOLOGY
End: 2022-01-10

## 2022-01-04 DIAGNOSIS — L70.0 ACNE VULGARIS: ICD-10-CM

## 2022-01-04 PROCEDURE — OTHER URINE PREGNANCY TEST: OTHER

## 2022-01-04 PROCEDURE — 81025 URINE PREGNANCY TEST: CPT

## 2022-01-04 PROCEDURE — OTHER ADDITIONAL NOTES: OTHER

## 2022-01-04 NOTE — PROCEDURE: ADDITIONAL NOTES
Additional Notes: Patient confirmed in IPledge.
Render Risk Assessment In Note?: no
Detail Level: Simple

## 2022-01-05 ENCOUNTER — RX ONLY (RX ONLY)
Age: 27
End: 2022-01-05

## 2022-01-05 RX ORDER — ISOTRETINOIN 20 MG/1
20 MG CAPSULE, LIQUID FILLED ORAL ONCE DAILY
Qty: 30 | Refills: 0 | Status: ERX | COMMUNITY
Start: 2022-01-05

## 2022-01-28 ENCOUNTER — NURSE TRIAGE (OUTPATIENT)
Dept: NURSING | Facility: CLINIC | Age: 27
End: 2022-01-28
Payer: COMMERCIAL

## 2022-01-29 NOTE — TELEPHONE ENCOUNTER
Patient calling with concerns about:  Left forarm pain (4/10), severe and growing buising  1/24/22  -Gave plasma at [facility] plasma center in [place].  She reports that she had unusual amount of  bleeding during plasma harvest.  She reports no pain during next couple days.  Now states physical movement is somewhat painful and also sensitive to touch.  She states that 'taking a shower is challenging.'     Patient Denies:  Difficulty breathing/shortness of breath  Too weak to stand     According to the protocol, patient should go to ED.  Care advice given. Patient verbalizes understanding and agrees with plan of care.   Portia Mir RN, Nurse Advisor 9:10 PM 1/28/2022  COVID 19 Nurse Triage Plan/Patient Instructions    Please be aware that novel coronavirus (COVID-19) may be circulating in the community. If you develop symptoms such as fever, cough, or SOB or if you have concerns about the presence of another infection including coronavirus (COVID-19), please contact your health care provider or visit [email or web address].     Disposition/Instructions    ED Visit recommended. Follow protocol based instructions.     Bring Your Own Device:  Please also bring your smart device(s) (smart phones, tablets, laptops) and their charging cables for your personal use and to communicate with your care team during your visit.    Thank you for taking steps to prevent the spread of this virus.  o Limit your contact with others.  o Wear a simple mask to cover your cough.  o Wash your hands well and often.    Resources    [facility]: About COVID-19: [email or web address]    CDC: What to Do If You're Sick: www.cdc.gov/coronavirus/2019-ncov/about/steps-when-sick.html    CDC: Ending Home Isolation: www.cdc.gov/coronavirus/2019-ncov/hcp/disposition-in-home-patients.html     CDC: Caring for Someone: www.cdc.gov/coronavirus/2019-ncov/if-you-are-sick/care-for-someone.html     [facility]: Interim Guidance for [facility]  Discharge to Home: www.health.[facility].[place]/diseases/coronavirus/hcp/hospdischarge.pdf    [facility] clinical trials (COVID-19 research studies): [email or web address]     Below are the COVID-19 hotlines at the [place] Department [facility] ([facility]). Interpreters are available.   o For health questions: Call 125-958-1720 or 1-372.781.6090 (7 a.m. to 7 p.m.)  o For questions about schools and childcare: Call 203-203-3528 or 1-661.322.3442 (7 a.m. to 7 p.m.)       Reason for Disposition    Arm is swollen, new onset (or leg swelling if IV in lower extremity)    Additional Information    Negative: Severe difficulty breathing (e.g., struggling for each breath, speaks in single words)    Negative: Shock suspected (e.g., cold/pale/clammy skin, too weak to stand, low BP, rapid pulse)    Negative: Sounds like a life-threatening emergency to the triager    Negative: IV not running or running slowly    Negative: [1] Difficulty breathing AND [2] not severe    Protocols used: IV SITE (SKIN) SYMPTOMS-A-AH

## 2022-02-17 ENCOUNTER — LAB (OUTPATIENT)
Dept: LAB | Facility: CLINIC | Age: 27
End: 2022-02-17
Payer: COMMERCIAL

## 2022-02-17 DIAGNOSIS — Z11.59 NEED FOR HEPATITIS C SCREENING TEST: Primary | ICD-10-CM

## 2022-02-17 DIAGNOSIS — E03.9 HYPOTHYROIDISM, UNSPECIFIED TYPE: ICD-10-CM

## 2022-02-17 LAB — TSH SERPL DL<=0.005 MIU/L-ACNC: 1.69 MU/L (ref 0.4–4)

## 2022-02-17 PROCEDURE — 36415 COLL VENOUS BLD VENIPUNCTURE: CPT

## 2022-02-17 PROCEDURE — 84443 ASSAY THYROID STIM HORMONE: CPT

## 2022-02-17 PROCEDURE — 86803 HEPATITIS C AB TEST: CPT

## 2022-02-18 LAB — HCV AB SERPL QL IA: NONREACTIVE

## 2022-03-18 DIAGNOSIS — J01.00 ACUTE MAXILLARY SINUSITIS, UNSPECIFIED: ICD-10-CM

## 2022-03-22 NOTE — TELEPHONE ENCOUNTER
Pending Prescriptions:                       Disp   Refills    fluticasone (FLONASE) 50 MCG/ACT nasal spr*16 mL           Sig: INSTILL 2 SPRAYS INTO EACH NOSTRIL ONCE DAILY.      Routing refill request to provider for review/approval because:  Drug not active on patient's medication list    Patrica Orta RN

## 2022-03-23 RX ORDER — FLUTICASONE PROPIONATE 50 MCG
SPRAY, SUSPENSION (ML) NASAL
Qty: 16 ML | Refills: 1 | Status: SHIPPED | OUTPATIENT
Start: 2022-03-23 | End: 2022-04-06

## 2022-03-25 ENCOUNTER — ANCILLARY PROCEDURE (OUTPATIENT)
Dept: GENERAL RADIOLOGY | Facility: CLINIC | Age: 27
End: 2022-03-25
Attending: PODIATRIST
Payer: COMMERCIAL

## 2022-03-25 ENCOUNTER — OFFICE VISIT (OUTPATIENT)
Dept: PODIATRY | Facility: CLINIC | Age: 27
End: 2022-03-25
Payer: COMMERCIAL

## 2022-03-25 VITALS — HEART RATE: 57 BPM | DIASTOLIC BLOOD PRESSURE: 74 MMHG | SYSTOLIC BLOOD PRESSURE: 136 MMHG

## 2022-03-25 DIAGNOSIS — S93.601A SPRAIN OF RIGHT FOOT, INITIAL ENCOUNTER: ICD-10-CM

## 2022-03-25 DIAGNOSIS — S93.601A SPRAIN OF RIGHT FOOT, INITIAL ENCOUNTER: Primary | ICD-10-CM

## 2022-03-25 PROCEDURE — 73610 X-RAY EXAM OF ANKLE: CPT | Mod: RT | Performed by: RADIOLOGY

## 2022-03-25 PROCEDURE — 73630 X-RAY EXAM OF FOOT: CPT | Mod: RT | Performed by: RADIOLOGY

## 2022-03-25 PROCEDURE — 99213 OFFICE O/P EST LOW 20 MIN: CPT | Performed by: PODIATRIST

## 2022-03-25 NOTE — LETTER
3/25/2022         RE: Mora Maldonado  [address] [facility] [address]        Dear Colleague,    Thank you for referring your patient, Mora Maldonado, to the [facility]. Please see a copy of my visit note below.    Subjective:    Patient seen is seen today  for right ankle sprain.  Had inversion sprain 3/16/22.  Going down stairs and patient had inversion sprain stepping on paint roller.  Had pain and edema, but this is slowly getting better.  Seen in clinic, xrays taken, and given ankle brace.  States this ankle is unstable as she injured this numerous times over the past few years.  She is walking at work.      ROS:  See above         Allergies   Allergen Reactions     Amoxicillin Hives     Penicillins Hives       Current Outpatient Medications   Medication Sig Dispense Refill     buPROPion (WELLBUTRIN XL) 150 MG 24 hr tablet TAKE 1 TABLET BY MOUTH EVERY MORNING 90 tablet 1     levonorgestrel (MIRENA) 20 MCG/24HR IUD 1 each (20 mcg) by Intrauterine route once       levothyroxine (SYNTHROID/LEVOTHROID) 125 MCG tablet Take 1 tablet (125 mcg) by mouth daily 30 tablet 11     phentermine (ADIPEX-P) 15 MG capsule Take 1 capsule (15 mg) by mouth every morning 30 capsule 2     fluticasone (FLONASE) 50 MCG/ACT nasal spray INSTILL 2 SPRAYS INTO EACH NOSTRIL ONCE DAILY. 16 mL 1       Patient Active Problem List   Diagnosis     Papanicolaou smear of cervix with low grade squamous intraepithelial lesion (LGSIL)     Hypothyroidism, unspecified type     Adjustment disorder with mixed anxiety and depressed mood     IUD (intrauterine device) in place - due for removal 11/2026       Past Medical History:   Diagnosis Date     Abnormal Pap smear of cervix 09/03/2019    See problem list     Anxiety      Depressive disorder      Hypothyroidism      Thyroid disease        Past Surgical History:   Procedure Laterality Date     COLONOSCOPY N/A 6/3/2021    Procedure: COLONOSCOPY, WITH BIOPSY;   Surgeon: Lindy Garcia MD;  Location: [facility] [place]     ESOPHAGOSCOPY, GASTROSCOPY, DUODENOSCOPY (EGD), COMBINED N/A 6/3/2021    Procedure: ESOPHAGOGASTRODUODENOSCOPY, WITH BIOPSY;  Surgeon: Lindy Garcia MD;  Location: [facility] [place]     LAPAROSCOPIC CHOLECYSTECTOMY N/A 5/15/2020    Procedure: laparoscopic cholecystectomy;  Surgeon: Peter Hidalgo MD;  Location: [place] OR       Family History   Problem Relation Age of Onset     Rheumatoid Arthritis Mother      Thyroid Disease Maternal Grandmother      Hypertension Sister      Crohn's Disease No family hx of      Ulcerative Colitis No family hx of        Social History     Tobacco Use     Smoking status: Never Smoker     Smokeless tobacco: Never Used   Substance Use Topics     Alcohol use: Yes     Comment: once a month         Exam:    Vitals: /74   Pulse 57   BMI: There is no height or weight on file to calculate BMI.  Height: Data Unavailable    Constitutional/ general:  Pt is in no apparent distress, appears well-nourished.  Cooperative with history and physical exam.     Psych:  The patient answered questions appropriately.  Normal affect.  Seems to have reasonable expectations, in terms of treatment.     Lungs:  Non labored breathing, non labored speech. No cough.  No audible wheezing. Even, quiet breathing.       Vascular:  positive pedal pulses bilaterally for both the DP and PT arteries.  CFT < 3 sec.  negative ankle edema.  positive pedal hair growth.    Neuro:  Alert and oriented x 3. Coordinated gait.  Light touch sensation is intact     Derm: Normal texture and turgor.  No erythema, ecchymosis, or cyanosis.      Musculoskeletal:    Lower extremity muscle strength is normal.   No gross deformities.   Pronated arch with weight bearing.  Muscle strength 5/5 in all compartments.  No pain with stressing any tendons.  Normal ROM all forefoot and rearfoot joints.  Anterior drawer equal and symmetrical bilateral.  Inversion equal and  symmetrical bilateral.       negative ecchymosis  negative erythema  negative pain at medial TMTJs   negative Achilles or calcaneal tubercle pain  negative medial ankle pain  negative pain AITF ligament  negative pain with stressing or palpation peroneal tendons  negative peroneal subluxation  negative pain over medial or lateral malleoli    Pain over right fourth and fifth tarsometatarsal joint and right calcaneocuboid joint.  Minimal pain over ATFL.  Slight edema.  No erythema or ecchymosis.  Lateral tendons intact.    Radiographic Exam:  X-Ray Findings:  I personally reviewed the films.  Unremarkable    Assessment: Right foot sprain    Plan:  X-rays taken today right ankle and foot.  Explained mechanism of injury.  Will dispense ankle brace today for protected weightbearing.  She will also wrap an Ace bandage around this.  Will slowly increase activities as tolerated.  Because of past injuries will refer to physical therapy for lateral strengthening and proprioception training.  RTC as needed.      Peter Mcmahan DPM, FACFAS          Again, thank you for allowing me to participate in the care of your patient.        Sincerely,        Peter Mcmahan DPM

## 2022-03-25 NOTE — PATIENT INSTRUCTIONS
We wish you continued good healing. If you have any questions or concerns, please do not hesitate to contact us at  [phone]    [employer] (secure e-mail communication and access to your chart) to send a message or to make an appointment.    Please remember to call and schedule a follow up appointment if one was recommended at your earliest convenience.     PODIATRY CLINIC HOURS  TELEPHONE NUMBER    Dr. Peter LUNAPPOLLY [facility]        Clinics:  Ta Velazquez CMA   Tuesday 1PM-6PM  [place]  Wednesday 745AM-330PM  [place]  Thursday/Friday 745AM-230PM  [place] GARCIA/[place] APPOINTMENTS  [phone]    [place] APPOINTMENTS  [phone]          If you need a medication refill, please contact us you may need lab work and/or a follow up visit prior to your refill (i.e. Antifungal medications).    If MRI needed please call Imaging at [phone] or [phone]    HOW DO I GET MY KNEE SCOOTER? Knee scooters can be picked up at ANY Medical Supply stores with your knee scooter Prescription.  OR    Bring your signed prescription to an [facility] showroom.

## 2022-03-25 NOTE — PROGRESS NOTES
Subjective:    Patient seen is seen today  for right ankle sprain.  Had inversion sprain 3/16/22.  Going down stairs and patient had inversion sprain stepping on paint roller.  Had pain and edema, but this is slowly getting better.  Seen in clinic, xrays taken, and given ankle brace.  States this ankle is unstable as she injured this numerous times over the past few years.  She is walking at work.      ROS:  See above         Allergies   Allergen Reactions     Amoxicillin Hives     Penicillins Hives       Current Outpatient Medications   Medication Sig Dispense Refill     buPROPion (WELLBUTRIN XL) 150 MG 24 hr tablet TAKE 1 TABLET BY MOUTH EVERY MORNING 90 tablet 1     levonorgestrel (MIRENA) 20 MCG/24HR IUD 1 each (20 mcg) by Intrauterine route once       levothyroxine (SYNTHROID/LEVOTHROID) 125 MCG tablet Take 1 tablet (125 mcg) by mouth daily 30 tablet 11     phentermine (ADIPEX-P) 15 MG capsule Take 1 capsule (15 mg) by mouth every morning 30 capsule 2     fluticasone (FLONASE) 50 MCG/ACT nasal spray INSTILL 2 SPRAYS INTO EACH NOSTRIL ONCE DAILY. 16 mL 1       Patient Active Problem List   Diagnosis     Papanicolaou smear of cervix with low grade squamous intraepithelial lesion (LGSIL)     Hypothyroidism, unspecified type     Adjustment disorder with mixed anxiety and depressed mood     IUD (intrauterine device) in place - due for removal 11/2026       Past Medical History:   Diagnosis Date     Abnormal Pap smear of cervix 09/03/2019    See problem list     Anxiety      Depressive disorder      Hypothyroidism      Thyroid disease        Past Surgical History:   Procedure Laterality Date     COLONOSCOPY N/A 6/3/2021    Procedure: COLONOSCOPY, WITH BIOPSY;  Surgeon: Lindy Garcia MD;  [place] [facility] [place]     ESOPHAGOSCOPY, GASTROSCOPY, DUODENOSCOPY (EGD), COMBINED N/A 6/3/2021    Procedure: ESOPHAGOGASTRODUODENOSCOPY, WITH BIOPSY;  Surgeon: Lindy Garcia MD;  [place] [facility] [place]      LAPAROSCOPIC CHOLECYSTECTOMY N/A 5/15/2020    Procedure: laparoscopic cholecystectomy;  Surgeon: Peter Hidalgo MD;  Location: [place] OR       Family History   Problem Relation Age of Onset     Rheumatoid Arthritis Mother      Thyroid Disease Maternal Grandmother      Hypertension Sister      Crohn's Disease No family hx of      Ulcerative Colitis No family hx of        Social History     Tobacco Use     Smoking status: Never Smoker     Smokeless tobacco: Never Used   Substance Use Topics     Alcohol use: Yes     Comment: once a month         Exam:    Vitals: /74   Pulse 57   BMI: There is no height or weight on file to calculate BMI.  Height: Data Unavailable    Constitutional/ general:  Pt is in no apparent distress, appears well-nourished.  Cooperative with history and physical exam.     Psych:  The patient answered questions appropriately.  Normal affect.  Seems to have reasonable expectations, in terms of treatment.     Lungs:  Non labored breathing, non labored speech. No cough.  No audible wheezing. Even, quiet breathing.       Vascular:  positive pedal pulses bilaterally for both the DP and PT arteries.  CFT < 3 sec.  negative ankle edema.  positive pedal hair growth.    Neuro:  Alert and oriented x 3. Coordinated gait.  Light touch sensation is intact     Derm: Normal texture and turgor.  No erythema, ecchymosis, or cyanosis.      Musculoskeletal:    Lower extremity muscle strength is normal.   No gross deformities.   Pronated arch with weight bearing.  Muscle strength 5/5 in all compartments.  No pain with stressing any tendons.  Normal ROM all forefoot and rearfoot joints.  Anterior drawer equal and symmetrical bilateral.  Inversion equal and symmetrical bilateral.       negative ecchymosis  negative erythema  negative pain at medial TMTJs   negative Achilles or calcaneal tubercle pain  negative medial ankle pain  negative pain AITF ligament  negative pain with stressing or palpation peroneal  tendons  negative peroneal subluxation  negative pain over medial or lateral malleoli    Pain over right fourth and fifth tarsometatarsal joint and right calcaneocuboid joint.  Minimal pain over ATFL.  Slight edema.  No erythema or ecchymosis.  Lateral tendons intact.    Radiographic Exam:  X-Ray Findings:  I personally reviewed the films.  Unremarkable    Assessment: Right foot sprain    Plan:  X-rays taken today right ankle and foot.  Explained mechanism of injury.  Will dispense ankle brace today for protected weightbearing.  She will also wrap an Ace bandage around this.  Will slowly increase activities as tolerated.  Because of past injuries will refer to physical therapy for lateral strengthening and proprioception training.  RTC as needed.      Peter Mcmahan DPM, FACFAS

## 2022-03-31 ENCOUNTER — OFFICE VISIT (OUTPATIENT)
Dept: PODIATRY | Facility: CLINIC | Age: 27
End: 2022-03-31
Payer: COMMERCIAL

## 2022-03-31 VITALS — OXYGEN SATURATION: 100 % | WEIGHT: 174 LBS | BODY MASS INDEX: 31.83 KG/M2 | HEART RATE: 88 BPM

## 2022-03-31 DIAGNOSIS — S90.31XA CONTUSION OF RIGHT FOOT, INITIAL ENCOUNTER: Primary | ICD-10-CM

## 2022-03-31 PROCEDURE — 99213 OFFICE O/P EST LOW 20 MIN: CPT | Performed by: PODIATRIST

## 2022-03-31 RX ORDER — CYCLOBENZAPRINE HCL 5 MG
TABLET ORAL
COMMUNITY
Start: 2022-02-08 | End: 2022-06-02

## 2022-03-31 NOTE — LETTER
[facility]  [address]  [place] [address]  Phone: [phone]    March 31, 2022        Mora Maldonado  [address] [address] [address]          To whom it may concern:    RE: Mora Maldonado    Patient was seen and treated today at our clinic and missed work.  No work 03/31/22 -4/1/22  Patient may return to work 4/4/22 with the following:  Must wear Cam boot walker.  When the patient returns to work, the following restrictions apply until :1 week        Please contact me for questions or concerns.      Sincerely,        Dr. Peter Mcmahan D.P.M FAC FAS/lld    (Electronically Signed)

## 2022-03-31 NOTE — LETTER
[facility]  [address]  [place] [address]  Phone: [phone]    March 31, 2022        Mora Maldonado  [address] [address] [address]          To whom it may concern:    RE: Mora Maldonado    Patient was seen and treated today at our clinic and missed work.  Patient may return to work today with the following:  Must Wear Cam boot.  When the patient returns to work, the following restrictions apply until 1 week:      Please contact me for questions or concerns.      Sincerely,        Dr. Peter Mcmahan D.P.M FAC FAS/lld    (Electronically Signed)

## 2022-03-31 NOTE — LETTER
3/31/2022         RE: Mora Maldonado  [address] [address] [facility] [address]        Dear Colleague,    Thank you for referring your patient, Mora Maldonado, to the [facility]. Please see a copy of my visit note below.    Subjective:    Patient seen is seen today  for right foot pain.  Had inversion sprain 3/16/22.  This was healing and feeling better and only wearing an ankle brace.  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  One became angry and stepped on her foot 2 days ago.  She points to the lateral tarsometatarsal joint as to where stepped on foot.  She had pain here.  Slight edema.  No bruising.  No weakness.  Denies numbness.      ROS:  See above         Allergies   Allergen Reactions     Amoxicillin Hives     Penicillins Hives       Current Outpatient Medications   Medication Sig Dispense Refill     buPROPion (WELLBUTRIN XL) 150 MG 24 hr tablet TAKE 1 TABLET BY MOUTH EVERY MORNING 90 tablet 1     fluticasone (FLONASE) 50 MCG/ACT nasal spray INSTILL 2 SPRAYS INTO EACH NOSTRIL ONCE DAILY. 16 mL 1     levonorgestrel (MIRENA) 20 MCG/24HR IUD 1 each (20 mcg) by Intrauterine route once       levothyroxine (SYNTHROID/LEVOTHROID) 125 MCG tablet Take 1 tablet (125 mcg) by mouth daily 30 tablet 11     phentermine (ADIPEX-P) 15 MG capsule Take 1 capsule (15 mg) by mouth every morning 30 capsule 2       Patient Active Problem List   Diagnosis     Papanicolaou smear of cervix with low grade squamous intraepithelial lesion (LGSIL)     Hypothyroidism, unspecified type     Adjustment disorder with mixed anxiety and depressed mood     IUD (intrauterine device) in place - due for removal 11/2026       Past Medical History:   Diagnosis Date     Abnormal Pap smear of cervix 09/03/2019    See problem list     Anxiety      Depressive disorder      Hypothyroidism      Thyroid disease        Past Surgical History:   Procedure Laterality Date     COLONOSCOPY N/A 6/3/2021    Procedure: COLONOSCOPY, WITH  BIOPSY;  Surgeon: Lindy Garcia MD;  [place] [facility] [place]     ESOPHAGOSCOPY, GASTROSCOPY, DUODENOSCOPY (EGD), COMBINED N/A 6/3/2021    Procedure: ESOPHAGOGASTRODUODENOSCOPY, WITH BIOPSY;  Surgeon: Lindy Garcia MD;  [place] [facility] [place]     LAPAROSCOPIC CHOLECYSTECTOMY N/A 5/15/2020    Procedure: laparoscopic cholecystectomy;  Surgeon: Peter Hidalgo MD;  Location: [place]       Family History   Problem Relation Age of Onset     Rheumatoid Arthritis Mother      Thyroid Disease Maternal Grandmother      Hypertension Sister      Crohn's Disease No family hx of      Ulcerative Colitis No family hx of        Social History     Tobacco Use     Smoking status: Never Smoker     Smokeless tobacco: Never Used   Substance Use Topics     Alcohol use: Yes     Comment: once a month         Exam:    Vitals: There were no vitals taken for this visit.  BMI: There is no height or weight on file to calculate BMI.  Height: Data Unavailable    Constitutional/ general:  Pt is in no apparent distress, appears well-nourished.  Cooperative with history and physical exam.     Psych:  The patient answered questions appropriately.  Normal affect.  Seems to have reasonable expectations, in terms of treatment.     Lungs:  Non labored breathing, non labored speech. No cough.  No audible wheezing. Even, quiet breathing.       Vascular:  positive pedal pulses bilaterally for both the DP and PT arteries.  CFT < 3 sec.  negative ankle edema.  positive pedal hair growth.    Neuro:  Alert and oriented x 3. Coordinated gait.  Light touch sensation is intact     Derm: Normal texture and turgor.  No erythema, ecchymosis, or cyanosis.      Musculoskeletal:    Lower extremity muscle strength is normal.   No gross deformities.   Pronated arch with weight bearing.  Muscle strength 5/5 in all compartments.  No pain with stressing any tendons.  Normal ROM all forefoot and rearfoot joints.  Anterior drawer equal and symmetrical  bilateral.  Inversion equal and symmetrical bilateral.       negative ecchymosis  negative erythema  negative pain at medial TMTJs   negative Achilles or calcaneal tubercle pain  negative medial ankle pain  negative pain AITF ligament  negative pain with stressing or palpation peroneal tendons  negative peroneal subluxation  negative pain over medial or lateral malleoli    Pain over right fourth and fifth tarsometatarsal joints.  Slight edema.  No ecchymosis or erythema.  Scant pain surrounding this.  No pain stressing any tendons and all tendons intact.    Radiographic Exam:  X-Ray Findings:  I personally reviewed the films.  Unremarkable    Assessment: Right foot blunt trauma    Plan: Discussed no signs of tendon pathology.  With minimal edema and no ecchymosis doubtful bone pathology.  Will protect with cam walker.  Will ice elevate and use compression.  Hopefully will need tincture of time to heal.  When going back to work next week will use cam walker as needed.  We will try to avoid trauma.  RTC as needed.      Peter Mcmahan DPM, FACFAS          Again, thank you for allowing me to participate in the care of your patient.        Sincerely,        Peter Mcmahan DPM

## 2022-03-31 NOTE — PROGRESS NOTES
Subjective:    Patient seen is seen today  for right foot pain.  Had inversion sprain 3/16/22.  This was healing and feeling better and only wearing an ankle brace.  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  One became angry and stepped on her foot 2 days ago.  She points to the lateral tarsometatarsal joint as to where stepped on foot.  She had pain here.  Slight edema.  No bruising.  No weakness.  Denies numbness.      ROS:  See above         Allergies   Allergen Reactions     Amoxicillin Hives     Penicillins Hives       Current Outpatient Medications   Medication Sig Dispense Refill     buPROPion (WELLBUTRIN XL) 150 MG 24 hr tablet TAKE 1 TABLET BY MOUTH EVERY MORNING 90 tablet 1     fluticasone (FLONASE) 50 MCG/ACT nasal spray INSTILL 2 SPRAYS INTO EACH NOSTRIL ONCE DAILY. 16 mL 1     levonorgestrel (MIRENA) 20 MCG/24HR IUD 1 each (20 mcg) by Intrauterine route once       levothyroxine (SYNTHROID/LEVOTHROID) 125 MCG tablet Take 1 tablet (125 mcg) by mouth daily 30 tablet 11     phentermine (ADIPEX-P) 15 MG capsule Take 1 capsule (15 mg) by mouth every morning 30 capsule 2       Patient Active Problem List   Diagnosis     Papanicolaou smear of cervix with low grade squamous intraepithelial lesion (LGSIL)     Hypothyroidism, unspecified type     Adjustment disorder with mixed anxiety and depressed mood     IUD (intrauterine device) in place - due for removal 11/2026       Past Medical History:   Diagnosis Date     Abnormal Pap smear of cervix 09/03/2019    See problem list     Anxiety      Depressive disorder      Hypothyroidism      Thyroid disease        Past Surgical History:   Procedure Laterality Date     COLONOSCOPY N/A 6/3/2021    Procedure: COLONOSCOPY, WITH BIOPSY;  Surgeon: Lindy Garcia MD;  [place] [facility] [place]     ESOPHAGOSCOPY, GASTROSCOPY, DUODENOSCOPY (EGD), COMBINED N/A 6/3/2021    Procedure: ESOPHAGOGASTRODUODENOSCOPY, WITH BIOPSY;  Surgeon: Lindy Garcia MD;  Location:  [facility] OR     LAPAROSCOPIC CHOLECYSTECTOMY N/A 5/15/2020    Procedure: laparoscopic cholecystectomy;  Surgeon: Peter Hidalgo MD;  Location: [place] OR       Family History   Problem Relation Age of Onset     Rheumatoid Arthritis Mother      Thyroid Disease Maternal Grandmother      Hypertension Sister      Crohn's Disease No family hx of      Ulcerative Colitis No family hx of        Social History     Tobacco Use     Smoking status: Never Smoker     Smokeless tobacco: Never Used   Substance Use Topics     Alcohol use: Yes     Comment: once a month         Exam:    Vitals: There were no vitals taken for this visit.  BMI: There is no height or weight on file to calculate BMI.  Height: Data Unavailable    Constitutional/ general:  Pt is in no apparent distress, appears well-nourished.  Cooperative with history and physical exam.     Psych:  The patient answered questions appropriately.  Normal affect.  Seems to have reasonable expectations, in terms of treatment.     Lungs:  Non labored breathing, non labored speech. No cough.  No audible wheezing. Even, quiet breathing.       Vascular:  positive pedal pulses bilaterally for both the DP and PT arteries.  CFT < 3 sec.  negative ankle edema.  positive pedal hair growth.    Neuro:  Alert and oriented x 3. Coordinated gait.  Light touch sensation is intact     Derm: Normal texture and turgor.  No erythema, ecchymosis, or cyanosis.      Musculoskeletal:    Lower extremity muscle strength is normal.   No gross deformities.   Pronated arch with weight bearing.  Muscle strength 5/5 in all compartments.  No pain with stressing any tendons.  Normal ROM all forefoot and rearfoot joints.  Anterior drawer equal and symmetrical bilateral.  Inversion equal and symmetrical bilateral.       negative ecchymosis  negative erythema  negative pain at medial TMTJs   negative Achilles or calcaneal tubercle pain  negative medial ankle pain  negative pain AITF ligament  negative pain with stressing  or palpation peroneal tendons  negative peroneal subluxation  negative pain over medial or lateral malleoli    Pain over right fourth and fifth tarsometatarsal joints.  Slight edema.  No ecchymosis or erythema.  Scant pain surrounding this.  No pain stressing any tendons and all tendons intact.    Radiographic Exam:  X-Ray Findings:  I personally reviewed the films.  Unremarkable    Assessment: Right foot blunt trauma    Plan: Discussed no signs of tendon pathology.  With minimal edema and no ecchymosis doubtful bone pathology.  Will protect with cam walker.  Will ice elevate and use compression.  Hopefully will need tincture of time to heal.  When going back to work next week will use cam walker as needed.  We will try to avoid trauma.  RTC as needed.      Peter Mcmahan DPM, FACFAS

## 2022-04-06 DIAGNOSIS — J01.00 ACUTE MAXILLARY SINUSITIS, UNSPECIFIED: ICD-10-CM

## 2022-04-06 RX ORDER — FLUTICASONE PROPIONATE 50 MCG
SPRAY, SUSPENSION (ML) NASAL
Qty: 48 ML | Refills: 0 | Status: SHIPPED | OUTPATIENT
Start: 2022-04-06 | End: 2022-11-04

## 2022-04-20 ENCOUNTER — APPOINTMENT (OUTPATIENT)
Dept: URBAN - METROPOLITAN AREA CLINIC 252 | Age: 27
Setting detail: DERMATOLOGY
End: 2022-04-25

## 2022-04-20 VITALS — WEIGHT: 293 LBS | RESPIRATION RATE: 16 BRPM | HEIGHT: 62 IN

## 2022-04-20 DIAGNOSIS — L28.1 PRURIGO NODULARIS: ICD-10-CM

## 2022-04-20 DIAGNOSIS — L70.0 ACNE VULGARIS: ICD-10-CM

## 2022-04-20 PROBLEM — L30.9 DERMATITIS, UNSPECIFIED: Status: ACTIVE | Noted: 2022-04-20

## 2022-04-20 PROCEDURE — OTHER PRESCRIPTION: OTHER

## 2022-04-20 PROCEDURE — 99214 OFFICE O/P EST MOD 30 MIN: CPT

## 2022-04-20 PROCEDURE — OTHER ISOTRETINOIN INITIATION: OTHER

## 2022-04-20 PROCEDURE — OTHER COUNSELING: OTHER

## 2022-04-20 PROCEDURE — OTHER URINE PREGNANCY TEST: OTHER

## 2022-04-20 PROCEDURE — OTHER ADDITIONAL NOTES: OTHER

## 2022-04-20 PROCEDURE — 81025 URINE PREGNANCY TEST: CPT

## 2022-04-20 RX ORDER — DAPSONE 50 MG/G
5% GEL TOPICAL QD
Qty: 90 | Refills: 0 | Status: ERX | COMMUNITY
Start: 2022-04-20

## 2022-04-20 ASSESSMENT — LOCATION SIMPLE DESCRIPTION DERM
LOCATION SIMPLE: RIGHT SHOULDER
LOCATION SIMPLE: LEFT CHEEK

## 2022-04-20 ASSESSMENT — LOCATION ZONE DERM
LOCATION ZONE: FACE
LOCATION ZONE: ARM

## 2022-04-20 ASSESSMENT — LOCATION DETAILED DESCRIPTION DERM
LOCATION DETAILED: RIGHT POSTERIOR SHOULDER
LOCATION DETAILED: LEFT CENTRAL MALAR CHEEK

## 2022-04-20 NOTE — PROCEDURE: ISOTRETINOIN INITIATION
Detail Level: Zone
Comments: will avoid sending oral antibiotic as pt states she is sensitive to antibiotics.

## 2022-04-20 NOTE — PROCEDURE: ADDITIONAL NOTES
Additional Notes: - Will need 8-hour fasting baseline lab work at next office visit.
Additional Notes: Recommended to keep covered with aquaphor and bandaid until healed, will recheck at NOV.
Detail Level: Simple
Render Risk Assessment In Note?: no

## 2022-04-20 NOTE — HPI: MEDICATION (ACCUTANE)
How Severe Is It?: moderate
Is This A New Presentation, Or A Follow-Up?: Evaluation for Accutane
Additional History: Patient never started accutane , missed window x2

## 2022-05-06 NOTE — TELEPHONE ENCOUNTER
Please clarify:    Script for:  - levothyroxine (SYNTHROID/LEVOTHROID) 25 MCG tablet, take 2 tablets (50 mcg) by mouth daily   (sent today)       Patient was previously on 50mcg daily - did the script mean to say 25mcg once daily?    Linda Mcclellan, RN, BSN, PHN     No

## 2022-05-15 ENCOUNTER — OFFICE VISIT (OUTPATIENT)
Dept: URGENT CARE | Facility: URGENT CARE | Age: 27
End: 2022-05-15
Payer: COMMERCIAL

## 2022-05-15 VITALS
WEIGHT: 169 LBS | DIASTOLIC BLOOD PRESSURE: 72 MMHG | HEART RATE: 93 BPM | OXYGEN SATURATION: 100 % | TEMPERATURE: 97.3 F | BODY MASS INDEX: 30.91 KG/M2 | SYSTOLIC BLOOD PRESSURE: 119 MMHG

## 2022-05-15 DIAGNOSIS — N30.00 ACUTE CYSTITIS WITHOUT HEMATURIA: ICD-10-CM

## 2022-05-15 DIAGNOSIS — R30.0 DYSURIA: Primary | ICD-10-CM

## 2022-05-15 LAB
ALBUMIN UR-MCNC: NEGATIVE MG/DL
APPEARANCE UR: CLEAR
BILIRUB UR QL STRIP: NEGATIVE
CLUE CELLS: NORMAL
COLOR UR AUTO: YELLOW
GLUCOSE UR STRIP-MCNC: NEGATIVE MG/DL
HGB UR QL STRIP: ABNORMAL
KETONES UR STRIP-MCNC: NEGATIVE MG/DL
LEUKOCYTE ESTERASE UR QL STRIP: ABNORMAL
NITRATE UR QL: NEGATIVE
PH UR STRIP: 5.5 [PH] (ref 5–7)
RBC #/AREA URNS AUTO: ABNORMAL /HPF
SP GR UR STRIP: <=1.005 (ref 1–1.03)
SQUAMOUS #/AREA URNS AUTO: ABNORMAL /LPF
TRICHOMONAS, WET PREP: NORMAL
UROBILINOGEN UR STRIP-ACNC: 0.2 E.U./DL
WBC #/AREA URNS AUTO: ABNORMAL /HPF
WBC'S/HIGH POWER FIELD, WET PREP: NORMAL
YEAST, WET PREP: NORMAL

## 2022-05-15 PROCEDURE — 87210 SMEAR WET MOUNT SALINE/INK: CPT | Performed by: PHYSICIAN ASSISTANT

## 2022-05-15 PROCEDURE — 99213 OFFICE O/P EST LOW 20 MIN: CPT | Performed by: PHYSICIAN ASSISTANT

## 2022-05-15 PROCEDURE — 81001 URINALYSIS AUTO W/SCOPE: CPT | Performed by: PHYSICIAN ASSISTANT

## 2022-05-15 PROCEDURE — 87086 URINE CULTURE/COLONY COUNT: CPT | Performed by: PHYSICIAN ASSISTANT

## 2022-05-15 RX ORDER — SULFAMETHOXAZOLE/TRIMETHOPRIM 800-160 MG
1 TABLET ORAL 2 TIMES DAILY
Qty: 10 TABLET | Refills: 0 | Status: SHIPPED | OUTPATIENT
Start: 2022-05-15 | End: 2022-05-20

## 2022-05-15 NOTE — PROGRESS NOTES
SUBJECTIVE: Mora Maldonado is a 27 year old female who complains of urinary frequency, urgency and dysuria x 2 days, without flank pain, fever, chills, or abnormal vaginal discharge or bleeding.     OBJECTIVE:  /72   Pulse 93   Temp 97.3  F (36.3  C) (Tympanic)   Wt 76.7 kg (169 lb)   SpO2 100%   BMI 30.91 kg/m         Appears well, in no apparent distress.  Vital signs are normal. The abdomen is soft without tenderness, guarding,       Results for orders placed or performed in visit on 05/15/22   UA macro with reflex to Microscopic and Culture - Clinc Collect     Status: Abnormal    Specimen: Urine, Clean Catch   Result Value Ref Range    Color Urine Yellow Colorless, Straw, Light Yellow, Yellow    Appearance Urine Clear Clear    Glucose Urine Negative Negative mg/dL    Bilirubin Urine Negative Negative    Ketones Urine Negative Negative mg/dL    Specific Gravity Urine <=1.005 1.003 - 1.035    Blood Urine Moderate (A) Negative    pH Urine 5.5 5.0 - 7.0    Protein Albumin Urine Negative Negative mg/dL    Urobilinogen Urine 0.2 0.2, 1.0 E.U./dL    Nitrite Urine Negative Negative    Leukocyte Esterase Urine Trace (A) Negative   Urine Microscopic     Status: Abnormal   Result Value Ref Range    RBC Urine 0-2 0-2 /HPF /HPF    WBC Urine 0-5 0-5 /HPF /HPF    Squamous Epithelials Urine Few (A) None Seen /LPF    Narrative    Urine Culture not indicated   Wet prep - Clinic Collect     Status: Normal    Specimen: Vagina; Swab   Result Value Ref Range    Trichomonas Absent Absent    Yeast Absent Absent    Clue Cells Absent Absent    WBCs/high power field None None       ASSESSMENT: UTI uncomplicated without evidence of pyelonephritis   Diagnosis Comments   1. Dysuria  UA macro with reflex to Microscopic and Culture - Clinc Collect, Urine Microscopic, Urine Culture Aerobic Bacterial - lab collect, Wet prep - Clinic Collect    2. Acute cystitis without hematuria  sulfamethoxazole-trimethoprim (BACTRIM DS) 800-160  MG tablet        PLAN: Treatment per orders - also push fluids, may use Pyridium OTC prn. Call or return to clinic prn if these symptoms worsen or fail to improve as anticipated.

## 2022-05-17 LAB — BACTERIA UR CULT: NORMAL

## 2022-05-25 ENCOUNTER — APPOINTMENT (OUTPATIENT)
Dept: URBAN - METROPOLITAN AREA CLINIC 252 | Age: 27
Setting detail: DERMATOLOGY
End: 2022-05-30

## 2022-05-25 VITALS — HEIGHT: 62 IN | WEIGHT: 165 LBS

## 2022-05-25 DIAGNOSIS — L70.0 ACNE VULGARIS: ICD-10-CM

## 2022-05-25 PROCEDURE — OTHER MIPS QUALITY: OTHER

## 2022-05-25 PROCEDURE — 36415 COLL VENOUS BLD VENIPUNCTURE: CPT

## 2022-05-25 PROCEDURE — OTHER ORDER TESTS: OTHER

## 2022-05-25 PROCEDURE — OTHER ISOTRETINOIN INITIATION: OTHER

## 2022-05-25 PROCEDURE — OTHER VENIPUNCTURE: OTHER

## 2022-05-25 PROCEDURE — OTHER ADDITIONAL NOTES: OTHER

## 2022-05-25 PROCEDURE — 81025 URINE PREGNANCY TEST: CPT

## 2022-05-25 PROCEDURE — 99214 OFFICE O/P EST MOD 30 MIN: CPT

## 2022-05-25 PROCEDURE — OTHER URINE PREGNANCY TEST: OTHER

## 2022-05-25 PROCEDURE — OTHER PRESCRIPTION: OTHER

## 2022-05-25 RX ORDER — ISOTRETINOIN 20 MG/1
20MG CAPSULE, LIQUID FILLED ORAL
Qty: 30 | Refills: 0 | Status: ERX | COMMUNITY
Start: 2022-05-25

## 2022-05-25 ASSESSMENT — LOCATION SIMPLE DESCRIPTION DERM
LOCATION SIMPLE: LEFT CHEEK
LOCATION SIMPLE: RIGHT UPPER ARM

## 2022-05-25 ASSESSMENT — LOCATION ZONE DERM
LOCATION ZONE: ARM
LOCATION ZONE: FACE

## 2022-05-25 ASSESSMENT — LOCATION DETAILED DESCRIPTION DERM
LOCATION DETAILED: LEFT CENTRAL MALAR CHEEK
LOCATION DETAILED: RIGHT ANTECUBITAL SKIN

## 2022-05-25 NOTE — HPI: MEDICATION (ACCUTANE)
How Severe Is It?: mild
Is This A New Presentation, Or A Follow-Up?: Evaluation for Accutane
Additional History: Patient starting Isotretinoin

## 2022-05-27 DIAGNOSIS — F43.23 ADJUSTMENT DISORDER WITH MIXED ANXIETY AND DEPRESSED MOOD: ICD-10-CM

## 2022-06-01 RX ORDER — BUPROPION HYDROCHLORIDE 150 MG/1
TABLET ORAL
Qty: 90 TABLET | Refills: 1 | Status: SHIPPED | OUTPATIENT
Start: 2022-06-01 | End: 2022-11-04 | Stop reason: DRUGHIGH

## 2022-06-01 NOTE — TELEPHONE ENCOUNTER
Pending Prescriptions:                       Disp   Refills    buPROPion (WELLBUTRIN XL) 150 MG 24 hr tab*90 tab*1        Sig: TAKE 1 TABLET BY MOUTH EVERY DAY IN THE MORNING    Routing refill request to provider for review/approval because:  PHQ9 due  Patient has appointment with you tomorrow    Renée Acevedo, BSN, RN

## 2022-06-02 ENCOUNTER — OFFICE VISIT (OUTPATIENT)
Dept: FAMILY MEDICINE | Facility: CLINIC | Age: 27
End: 2022-06-02
Payer: COMMERCIAL

## 2022-06-02 VITALS
WEIGHT: 171 LBS | TEMPERATURE: 98 F | DIASTOLIC BLOOD PRESSURE: 74 MMHG | HEIGHT: 64 IN | OXYGEN SATURATION: 98 % | BODY MASS INDEX: 29.19 KG/M2 | HEART RATE: 105 BPM | SYSTOLIC BLOOD PRESSURE: 120 MMHG

## 2022-06-02 DIAGNOSIS — Z00.00 ROUTINE HISTORY AND PHYSICAL EXAMINATION OF ADULT: Primary | ICD-10-CM

## 2022-06-02 DIAGNOSIS — K90.41 GLUTEN-SENSITIVE ENTEROPATHY: ICD-10-CM

## 2022-06-02 DIAGNOSIS — E03.9 HYPOTHYROIDISM, UNSPECIFIED TYPE: ICD-10-CM

## 2022-06-02 DIAGNOSIS — F43.23 ADJUSTMENT DISORDER WITH MIXED ANXIETY AND DEPRESSED MOOD: ICD-10-CM

## 2022-06-02 DIAGNOSIS — E66.811 CLASS 1 OBESITY WITHOUT SERIOUS COMORBIDITY IN ADULT, UNSPECIFIED BMI, UNSPECIFIED OBESITY TYPE: ICD-10-CM

## 2022-06-02 DIAGNOSIS — R14.0 ABDOMINAL BLOATING: ICD-10-CM

## 2022-06-02 LAB — TSH SERPL DL<=0.005 MIU/L-ACNC: 1.15 MU/L (ref 0.4–4)

## 2022-06-02 PROCEDURE — 36415 COLL VENOUS BLD VENIPUNCTURE: CPT | Performed by: PHYSICIAN ASSISTANT

## 2022-06-02 PROCEDURE — 84443 ASSAY THYROID STIM HORMONE: CPT | Performed by: PHYSICIAN ASSISTANT

## 2022-06-02 PROCEDURE — 99214 OFFICE O/P EST MOD 30 MIN: CPT | Mod: 25 | Performed by: PHYSICIAN ASSISTANT

## 2022-06-02 PROCEDURE — 99395 PREV VISIT EST AGE 18-39: CPT | Performed by: PHYSICIAN ASSISTANT

## 2022-06-02 RX ORDER — TOPIRAMATE 50 MG/1
50 TABLET, FILM COATED ORAL DAILY
Qty: 90 TABLET | Refills: 1 | Status: SHIPPED | OUTPATIENT
Start: 2022-06-02 | End: 2022-11-29

## 2022-06-02 RX ORDER — ISOTRETINOIN 20 MG/1
40 CAPSULE, LIQUID FILLED ORAL DAILY
COMMUNITY
Start: 2022-05-25 | End: 2023-09-25

## 2022-06-02 RX ORDER — LEVOTHYROXINE SODIUM 125 UG/1
125 TABLET ORAL DAILY
Qty: 90 TABLET | Refills: 3 | Status: SHIPPED | OUTPATIENT
Start: 2022-06-02 | End: 2023-05-16

## 2022-06-02 RX ORDER — BUPROPION HYDROCHLORIDE 300 MG/1
300 TABLET ORAL EVERY MORNING
Qty: 90 TABLET | Refills: 3 | Status: SHIPPED | OUTPATIENT
Start: 2022-06-02 | End: 2023-05-17

## 2022-06-02 ASSESSMENT — ENCOUNTER SYMPTOMS
HEADACHES: 0
SORE THROAT: 0
SHORTNESS OF BREATH: 0
ARTHRALGIAS: 0
FREQUENCY: 0
MYALGIAS: 0
CONSTIPATION: 0
HEARTBURN: 0
BREAST MASS: 0
CHILLS: 0
NERVOUS/ANXIOUS: 1
COUGH: 0
HEMATURIA: 0
NAUSEA: 0
PALPITATIONS: 0
FEVER: 0
EYE PAIN: 0
ABDOMINAL PAIN: 1
PARESTHESIAS: 0
DIARRHEA: 0
WEAKNESS: 0
JOINT SWELLING: 0
DIZZINESS: 0
HEMATOCHEZIA: 0
DYSURIA: 0

## 2022-06-02 ASSESSMENT — PATIENT HEALTH QUESTIONNAIRE - PHQ9
10. IF YOU CHECKED OFF ANY PROBLEMS, HOW DIFFICULT HAVE THESE PROBLEMS MADE IT FOR YOU TO DO YOUR WORK, TAKE CARE OF THINGS AT HOME, OR GET ALONG WITH OTHER PEOPLE: SOMEWHAT DIFFICULT
SUM OF ALL RESPONSES TO PHQ QUESTIONS 1-9: 10
SUM OF ALL RESPONSES TO PHQ QUESTIONS 1-9: 10

## 2022-06-02 ASSESSMENT — ANXIETY QUESTIONNAIRES
6. BECOMING EASILY ANNOYED OR IRRITABLE: SEVERAL DAYS
7. FEELING AFRAID AS IF SOMETHING AWFUL MIGHT HAPPEN: MORE THAN HALF THE DAYS
GAD7 TOTAL SCORE: 13
2. NOT BEING ABLE TO STOP OR CONTROL WORRYING: MORE THAN HALF THE DAYS
4. TROUBLE RELAXING: MORE THAN HALF THE DAYS
GAD7 TOTAL SCORE: 13
3. WORRYING TOO MUCH ABOUT DIFFERENT THINGS: MORE THAN HALF THE DAYS
8. IF YOU CHECKED OFF ANY PROBLEMS, HOW DIFFICULT HAVE THESE MADE IT FOR YOU TO DO YOUR WORK, TAKE CARE OF THINGS AT HOME, OR GET ALONG WITH OTHER PEOPLE?: VERY DIFFICULT
5. BEING SO RESTLESS THAT IT IS HARD TO SIT STILL: MORE THAN HALF THE DAYS
GAD7 TOTAL SCORE: 13
7. FEELING AFRAID AS IF SOMETHING AWFUL MIGHT HAPPEN: MORE THAN HALF THE DAYS
1. FEELING NERVOUS, ANXIOUS, OR ON EDGE: MORE THAN HALF THE DAYS

## 2022-06-02 ASSESSMENT — PAIN SCALES - GENERAL: PAINLEVEL: NO PAIN (0)

## 2022-06-02 NOTE — PROGRESS NOTES
SUBJECTIVE:Answers for HPI/ROS submitted by the patient on 6/2/2022  If you checked off any problems, how difficult have these problems made it for you to do your work, take care of things at home, or get along with other people?: Somewhat difficult  PHQ9 TOTAL SCORE: 10  MOHINDER 7 TOTAL SCORE: 13       CC: Mora Maldonado is an 27 year old woman who presents for preventive health visit.       Patient has been advised of split billing requirements and indicates understanding: Yes  Healthy Habits:     Getting at least 3 servings of Calcium per day:  Yes    Bi-annual eye exam:  Yes    Dental care twice a year:  Yes    Sleep apnea or symptoms of sleep apnea:  None    Diet:  Regular (no restrictions) and Gluten-free/reduced    Frequency of exercise:  2-3 days/week    Duration of exercise:  15-30 minutes    Taking medications regularly:  Yes    Medication side effects:  None    PHQ-2 Total Score: 2    Additional concerns today:  Yes    Moods doing well with current dose of Wellbutrin. Still a bit frustrated by her weight as this will not change despite diet and exercise changes.     Still having a lot of trouble with abdominal pain and bloating. Does know that gluten plays a role but would like to see GI related to this.    Today's PHQ-2 Score:   PHQ-2 ( 1999 [employer]) 6/2/2022   Q1: Little interest or pleasure in doing things 1   Q2: Feeling down, depressed or hopeless 1   PHQ-2 Score 2   PHQ-2 Total Score (12-17 Years)- Positive if 3 or more points; Administer PHQ-A if positive -   Q1: Little interest or pleasure in doing things Several days   Q2: Feeling down, depressed or hopeless Several days   PHQ-2 Score 2       Abuse: Current or Past (Physical, Sexual or Emotional) - No  Do you feel safe in your environment? Yes    Have you ever done Advance Care Planning? (For example, a Health Directive, POLST, or a discussion with a medical provider or your loved ones about your wishes): No, advance care planning information  given to patient to review.  Patient declined advance care planning discussion at this time.    Social History     Tobacco Use     Smoking status: Never Smoker     Smokeless tobacco: Never Used   Substance Use Topics     Alcohol use: Yes     Comment: once a month         Alcohol Use 6/2/2022   Prescreen: >3 drinks/day or >7 drinks/week? Not Applicable   Prescreen: >3 drinks/day or >7 drinks/week? -       Reviewed orders with patient.  Reviewed health maintenance and updated orders accordingly - Yes  BP Readings from Last 3 Encounters:   06/02/22 120/74   05/15/22 119/72   03/25/22 136/74    Wt Readings from Last 3 Encounters:   06/02/22 77.6 kg (171 lb)   05/15/22 76.7 kg (169 lb)   03/31/22 78.9 kg (174 lb)                  Patient Active Problem List   Diagnosis     Papanicolaou smear of cervix with low grade squamous intraepithelial lesion (LGSIL)     Hypothyroidism, unspecified type     Adjustment disorder with mixed anxiety and depressed mood     IUD (intrauterine device) in place - due for removal 11/2026     Gluten-sensitive enteropathy     Past Surgical History:   Procedure Laterality Date     COLONOSCOPY N/A 6/3/2021    Procedure: COLONOSCOPY, WITH BIOPSY;  Surgeon: Lindy Garcia MD;  Location: [facility] OR     ESOPHAGOSCOPY, GASTROSCOPY, DUODENOSCOPY (EGD), COMBINED N/A 6/3/2021    Procedure: ESOPHAGOGASTRODUODENOSCOPY, WITH BIOPSY;  Surgeon: Lindy Garcia MD;  Location: [facility] OR     LAPAROSCOPIC CHOLECYSTECTOMY N/A 5/15/2020    Procedure: laparoscopic cholecystectomy;  Surgeon: Peter Hidalgo MD;  Location: [facility] OR       Social History     Tobacco Use     Smoking status: Never Smoker     Smokeless tobacco: Never Used   Substance Use Topics     Alcohol use: Yes     Comment: once a month     Family History   Problem Relation Age of Onset     Rheumatoid Arthritis Mother      Thyroid Disease Maternal Grandmother      Hypertension Sister      Crohn's Disease No family hx of       Ulcerative Colitis No family hx of          Current Outpatient Medications   Medication Sig Dispense Refill     buPROPion (WELLBUTRIN XL) 150 MG 24 hr tablet TAKE 1 TABLET BY MOUTH EVERY DAY IN THE MORNING 90 tablet 1     buPROPion (WELLBUTRIN XL) 300 MG 24 hr tablet Take 1 tablet (300 mg) by mouth every morning 90 tablet 3     CLARAVIS 20 MG capsule Take 20 mg by mouth daily       fluticasone (FLONASE) 50 MCG/ACT nasal spray USE 2 SPRAYS INTO EACH NOSTRIL ONCE DAILY 48 mL 0     levonorgestrel (MIRENA) 20 MCG/24HR IUD 1 each (20 mcg) by Intrauterine route once       levothyroxine (SYNTHROID/LEVOTHROID) 125 MCG tablet Take 1 tablet (125 mcg) by mouth daily 90 tablet 3     topiramate (TOPAMAX) 50 MG tablet Take 1 tablet (50 mg) by mouth daily 90 tablet 1       Breast Cancer Screening:    Breast CA Risk Assessment (FHS-7) 6/2/2022   Do you have a family history of breast, colon, or ovarian cancer? No / Unknown       Patient under 40 years of age: Routine Mammogram Screening not recommended.   Pertinent mammograms are reviewed under the imaging tab.    History of abnormal Pap smear: NO - age 21-29 PAP every 3 years recommended  PAP / HPV Latest Ref Rng & Units 11/11/2020 9/3/2019   PAP (Historical) - NIL LSIL(A)   HPV16 NEG:Negative Negative -   HPV18 NEG:Negative Negative -   HRHPV NEG:Negative Negative -     Reviewed and updated as needed this visit by clinical staff   Tobacco  Allergies  Meds  Problems  Med Hx  Surg Hx  Fam Hx  Soc   Hx          Reviewed and updated as needed this visit by Provider   Tobacco  Allergies  Meds  Problems  Med Hx  Surg Hx  Fam Hx               Review of Systems   Constitutional: Negative for chills and fever.   HENT: Negative for congestion, ear pain, hearing loss and sore throat.    Eyes: Negative for pain and visual disturbance.   Respiratory: Negative for cough and shortness of breath.    Cardiovascular: Negative for chest pain, palpitations and peripheral edema.  "  Gastrointestinal: Positive for abdominal pain. Negative for constipation, diarrhea, heartburn, hematochezia and nausea.   Breasts:  Negative for tenderness, breast mass and discharge.   Genitourinary: Positive for pelvic pain. Negative for dysuria, frequency, genital sores, hematuria, urgency, vaginal bleeding and vaginal discharge.   Musculoskeletal: Negative for arthralgias, joint swelling and myalgias.   Skin: Negative for rash.   Neurological: Negative for dizziness, weakness, headaches and paresthesias.   Psychiatric/Behavioral: Negative for mood changes. The patient is nervous/anxious.      OBJECTIVE:   /74   Pulse 105   Temp 98  F (36.7  C) (Temporal)   Ht 1.613 m (5' 3.5\")   Wt 77.6 kg (171 lb)   SpO2 98%   BMI 29.82 kg/m    Physical Exam  GENERAL: healthy, alert and no distress  EYES: Eyes grossly normal to inspection, PERRL and conjunctivae and sclerae normal  HENT: ear canals and TM's normal, nose and mouth without ulcers or lesions  NECK: no adenopathy, no asymmetry, masses, or scars and thyroid normal to palpation  RESP: lungs clear to auscultation - no rales, rhonchi or wheezes  CV: regular rate and rhythm, normal S1 S2, no S3 or S4, no murmur, click or rub, no peripheral edema and peripheral pulses strong  ABDOMEN: soft, nontender, no hepatosplenomegaly, no masses and bowel sounds normal  MS: no gross musculoskeletal defects noted, no edema  SKIN: no suspicious lesions or rashes  NEURO: Normal strength and tone, mentation intact and speech normal  PSYCH: mentation appears normal, affect normal/bright    Diagnostic Test Results:  Labs reviewed in Epic    ASSESSMENT/PLAN:   (Z00.00) Routine history and physical examination of adult  (primary encounter diagnosis)    (E66.9) Class 1 obesity without serious comorbidity in adult, unspecified BMI, unspecified obesity type  Comment: Will have patient trial Topiramate. Appropriate use and side effects discussed. Encouraged ongoing diet and " "exercise modifications.  Plan: topiramate (TOPAMAX) 50 MG tablet    (F43.23) Adjustment disorder with mixed anxiety and depressed mood  Comment: Stable on current dose of Wellbutrin.  Plan: buPROPion (WELLBUTRIN XL) 300 MG 24 hr tablet         (E03.9) Hypothyroidism, unspecified type  Plan: levothyroxine (SYNTHROID/LEVOTHROID) 125 MCG         tablet, TSH with free T4 reflex    (K90.41) Gluten-sensitive    (R14.0) Abdominal bloating  Plan: Adult Gastro Ref - Consult Only      Patient has been advised of split billing requirements and indicates understanding: Yes    COUNSELING:  Reviewed preventive health counseling, as reflected in patient instructions    Estimated body mass index is 29.82 kg/m  as calculated from the following:    Height as of this encounter: 1.613 m (5' 3.5\").    Weight as of this encounter: 77.6 kg (171 lb).    Weight management plan: Discussed healthy diet and exercise guidelines    She reports that she has never smoked. She has never used smokeless tobacco.      Counseling Resources:  ATP IV Guidelines  Pooled Cohorts Equation Calculator  Breast Cancer Risk Calculator  BRCA-Related Cancer Risk Assessment: FHS-7 Tool  FRAX Risk Assessment  ICSI Preventive Guidelines  Dietary Guidelines for Americans, 2010  USDA's MyPlate  ASA Prophylaxis  Lung CA Screening    Amrita Rose PA-C  [facility]  "

## 2022-06-09 NOTE — HPI: MEDICATION (ACCUTANE)
How Severe Is It?: moderate
Is This A New Presentation, Or A Follow-Up?: Follow Up Accutane
Additional History: Initiating accutane
Yes

## 2022-06-27 ENCOUNTER — APPOINTMENT (OUTPATIENT)
Dept: URBAN - METROPOLITAN AREA CLINIC 252 | Age: 27
Setting detail: DERMATOLOGY
End: 2022-06-27

## 2022-06-27 VITALS — WEIGHT: 165 LBS | HEIGHT: 62 IN | RESPIRATION RATE: 16 BRPM

## 2022-06-27 DIAGNOSIS — L70.0 ACNE VULGARIS: ICD-10-CM

## 2022-06-27 PROCEDURE — OTHER MIPS QUALITY: OTHER

## 2022-06-27 PROCEDURE — OTHER ADDITIONAL NOTES: OTHER

## 2022-06-27 PROCEDURE — OTHER PRESCRIPTION: OTHER

## 2022-06-27 PROCEDURE — OTHER ISOTRETINOIN MONITORING: OTHER

## 2022-06-27 PROCEDURE — 99214 OFFICE O/P EST MOD 30 MIN: CPT

## 2022-06-27 PROCEDURE — OTHER URINE PREGNANCY TEST: OTHER

## 2022-06-27 PROCEDURE — 81025 URINE PREGNANCY TEST: CPT

## 2022-06-27 RX ORDER — ISOTRETINOIN 40 MG/1
40MG CAPSULE, LIQUID FILLED ORAL
Qty: 30 | Refills: 0 | Status: ERX | COMMUNITY
Start: 2022-06-27

## 2022-06-27 ASSESSMENT — LOCATION SIMPLE DESCRIPTION DERM: LOCATION SIMPLE: LEFT CHEEK

## 2022-06-27 ASSESSMENT — LOCATION DETAILED DESCRIPTION DERM: LOCATION DETAILED: LEFT CENTRAL MALAR CHEEK

## 2022-06-27 ASSESSMENT — LOCATION ZONE DERM: LOCATION ZONE: FACE

## 2022-06-27 NOTE — PROCEDURE: ISOTRETINOIN MONITORING
Myalgia Treatment: I explained this is common when taking isotretinoin. If this worsens they will contact us. They may try OTC ibuprofen.
Xerosis Normal Treatment: I recommended application of Cetaphil or CeraVe numerous times a day going to bed to all dry areas.
Lower Range (In Mg/Kg): 120
Use Enhanced Counseling Feature (Automatic): No
Kilograms Preamble Statement (Weight Entered In Details Tab): Reported Weight in kilograms:
Male Completion Statement: - After discussing his treatment course we decided to discontinue isotretinoin therapy at this time.\\n- He shouldn't donate blood for one month after the last dose. He should call with any new symptoms of depression.\\n- Discussed maintenance options.
Hypercholesterolemia Monitoring: I explained this is common when taking isotretinoin. We will monitor closely.
Target Cumulative Dosage (In Mg/Kg): 135
Hypertriglyceridemia Treatment: - I explained this is common when taking isotretinoin. \\n- Advised that taking over-the-counter fish oil can be helpful to reduce triglycerides.\\n- We will continue to monitor.
Weight Units: pounds
Cheilitis Normal Treatment: I recommended application of Vaseline or Aquaphor numerous times a day (as often as every hour) and before going to bed.
Retinoid Dermatitis Normal Treatment: - Recommended application of Cetaphil or CeraVe cream numerous times a day and before going to bed to all dry and rashy areas.
Pounds Preamble Statement (Weight Entered In Details Tab): Reported Weight in pounds:
Cheilitis Normal Treatment: - Recommended application of Vaseline ointment or Aquaphor numerous times a day (as often as every hour) and before going to bed.
Next Month's Dosage: Continue Current Dosage
Myalgia Monitoring: I explained this is common when taking isotretinoin. If this worsens they will contact us.
Headache Monitoring: I recommended monitoring the headaches for now. There is no evidence of increased intracranial pressure. They were instructed to call if the headaches are worsening.
Xerosis Normal Treatment: - Recommended application of Cetaphil or CeraVe cream numerous times a day and before going to bed to all dry areas.
Detail Level: Zone
What Is The Patient's Gender: Female
Are Labs Available For Review?: Yes
Is Cheilitis Present?: Yes - Normal Treatment
Counseling Text: I reviewed the side effect in detail. Patient should get monthly blood tests, not donate blood, not drive at night if vision affected, and not share medication.
Xerosis Aggressive Treatment: - Recommended application of Cetaphil or CeraVe cream numerous times a day and before going to bed to all dry areas.\\n- Also, will prescribe a topical steroid for twice daily use as needed until pruritus is resolved for never longer than 14 days per month.
Cheilitis Aggressive Treatment: - Recommended application of Dr. Dan’s Cortibalm used several times per day (as often as every hour you are awake when very severe lip dryness is present).\\n- Once improved switch back to Vaseline or Aquaphor numerous times a day and before going to bed.
Months Of Therapy Completed: 1
Retinoid Dermatitis Normal Treatment: I recommended more frequent application of Cetaphil or CeraVe to the areas of dermatitis.
Headache Monitoring: - Recommended monitoring the headaches for now. There is no evidence of increased intracranial pressure. They were instructed to call if the headaches are worsening.
Use Therapeutic Ranged Or Therapeutic Target: please select Range or Target
Female Pregnancy Counseling Text: Female patients should also be on two forms of birth control while taking this medication and for one month after their last dose.
Nosebleeds Normal Treatment: - I explained nosebleeds are common when taking isotretinoin. \\n- Recommended saline mist in each nostril multiple times a day. \\n- May also consider applying Aquaphor intranasally once every night.\\n- If this worsens, call the clinic. Patient expressed understanding.
Cheilitis Aggressive Treatment: I recommended application of Vaseline or Aquaphor numerous times a day (as often as every hour) and before going to bed. I also prescribed a topical steroid for twice daily use.
Retinoid Dermatitis Aggressive Treatment: - Recommended application of Cetaphil or CeraVe cream numerous times a day and before going to bed to all dry and rashy areas.\\n- Also, will prescribe a topical steroid for twice daily use as needed until pruritus is resolved for never longer than 14 days per month.
Female Completion Statement: FEMALE PATIENT - FINAL VISIT \\n- Discussed that we will be discontinuing isotretinoin today.\\n- Reminded that it is still imperative to maintain 100% compliance with the same methods of birth control she has been using throughout this course of isotretinoin for at least 30 days beyond her final pill. \\n- Advise that a final urine pregnancy test will be necessary to complete the retirements of the [employer] program in 30-37 days from now. \\n- Advised to schedule a nurse visit for this final UPT.   \\n- Reviewed the possible expectations for her acne from here on out.\\n- Discussed maintenance options.\\n- Urine pregnant test performed today.
Upper Range (In Mg/Kg): 150
Retinoid Dermatitis Aggressive Treatment: I recommended more frequent application of Cetaphil or CeraVe to the areas of dermatitis. I also prescribed a topical steroid for twice daily use until the dermatitis resolves.
Myalgia Treatment: - Explained this is common when taking isotretinoin. \\n- May try OTC ibuprofen no more frequently than every 6 hours.\\n- If this worsens, then contact us.
Xerosis Aggressive Treatment: I recommended application of Cetaphil or CeraVe numerous times a day going to bed to all dry areas. I also prescribed a topical steroid for twice daily use.
Myalgia Monitoring: - At this point, patient reports that this is mild and tolerable. Explained this is common when taking isotretinoin. If this worsens, contact us.
Nosebleeds Normal Treatment: I explained this is common when taking isotretinoin. I recommended saline mist in each nostril multiple times a day. If this worsens they will contact us.

## 2022-06-27 NOTE — PROCEDURE: ADDITIONAL NOTES
Detail Level: Simple
Render Risk Assessment In Note?: no
Additional Notes: **recommended Omega 3 supplement.\\n**samples of Eucerin sunscreen and CeraVe Am given today.

## 2022-07-29 ENCOUNTER — APPOINTMENT (OUTPATIENT)
Dept: URBAN - METROPOLITAN AREA CLINIC 252 | Age: 27
Setting detail: DERMATOLOGY
End: 2022-07-29

## 2022-07-29 VITALS — WEIGHT: 160 LBS | HEIGHT: 62 IN

## 2022-07-29 DIAGNOSIS — L70.0 ACNE VULGARIS: ICD-10-CM

## 2022-07-29 PROCEDURE — 36415 COLL VENOUS BLD VENIPUNCTURE: CPT

## 2022-07-29 PROCEDURE — OTHER PRESCRIPTION: OTHER

## 2022-07-29 PROCEDURE — 99214 OFFICE O/P EST MOD 30 MIN: CPT

## 2022-07-29 PROCEDURE — 81025 URINE PREGNANCY TEST: CPT

## 2022-07-29 PROCEDURE — OTHER URINE PREGNANCY TEST: OTHER

## 2022-07-29 PROCEDURE — OTHER ISOTRETINOIN MONITORING: OTHER

## 2022-07-29 PROCEDURE — OTHER VENIPUNCTURE: OTHER

## 2022-07-29 PROCEDURE — OTHER ORDER TESTS: OTHER

## 2022-07-29 RX ORDER — ISOTRETINOIN 30 MG/1
30 CAPSULE, LIQUID FILLED ORAL TWICE PER DAY
Qty: 60 | Refills: 0 | Status: ERX | COMMUNITY
Start: 2022-07-29

## 2022-07-29 ASSESSMENT — LOCATION ZONE DERM
LOCATION ZONE: FACE
LOCATION ZONE: ARM

## 2022-07-29 ASSESSMENT — LOCATION DETAILED DESCRIPTION DERM
LOCATION DETAILED: LEFT CENTRAL MALAR CHEEK
LOCATION DETAILED: LEFT ANTECUBITAL SKIN

## 2022-07-29 ASSESSMENT — LOCATION SIMPLE DESCRIPTION DERM
LOCATION SIMPLE: LEFT CHEEK
LOCATION SIMPLE: LEFT UPPER ARM

## 2022-07-29 NOTE — HPI: MEDICATION (ISOTRETINOIN)
How Severe Is It?: moderate
Is This A New Presentation, Or A Follow-Up?: Follow Up Isotretinoin
Additional History: Patient reports 100% compliance with both forms of birth control. Patient is experiencing the side effect of dryness, but reports that the dryness is tolerable and mangeable. Patient is exteriencing the side effect of cheilitis but this manageable. Patient is fasting today.   She picks at her acne. Has the mirena and was placed 2 years ago.

## 2022-07-29 NOTE — PROCEDURE: ISOTRETINOIN MONITORING
Myalgia Monitoring: - At this point, patient reports that this is mild and tolerable. Explained this is common when taking isotretinoin. If this worsens, contact us.
Cheilitis Aggressive Treatment: I recommended application of Vaseline or Aquaphor numerous times a day (as often as every hour) and before going to bed. I also prescribed a topical steroid for twice daily use.
Hypertriglyceridemia Monitoring: - I explained this is common when taking isotretinoin. \\n- Advised that taking over-the-counter fish oil can be helpful to reduce triglycerides.\\n- We will continue to monitor.
Xerosis Normal Treatment: - Recommended application of Cetaphil or CeraVe cream numerous times a day and before going to bed to all dry areas.
Xerosis Aggressive Treatment: I recommended application of Cetaphil or CeraVe numerous times a day going to bed to all dry areas. I also prescribed a topical steroid for twice daily use.
Pounds Preamble Statement (Weight Entered In Details Tab): Reported Weight in pounds:
Nosebleeds Normal Treatment: - I explained nosebleeds are common when taking isotretinoin. \\n- Recommended saline mist in each nostril multiple times a day. \\n- May also consider applying Aquaphor intranasally once every night.\\n- If this worsens, call the clinic. Patient expressed understanding.
Are Labs Available For Review?: Yes
Use Therapeutic Ranged Or Therapeutic Target: please select Range or Target
Myalgia Treatment: - Explained this is common when taking isotretinoin. \\n- May try OTC ibuprofen no more frequently than every 6 hours.\\n- If this worsens, then contact us.
Headache Monitoring: I recommended monitoring the headaches for now. There is no evidence of increased intracranial pressure. They were instructed to call if the headaches are worsening.
Cheilitis Normal Treatment: I recommended application of Vaseline or Aquaphor numerous times a day (as often as every hour) and before going to bed.
Counseling Text: I reviewed the side effect in detail. Patient should get monthly blood tests, not donate blood, not drive at night if vision affected, and not share medication.
Include Validation In Note: No
Months Of Therapy Completed: 2
Female Completion Statement: FEMALE PATIENT - FINAL VISIT \\n- Discussed that we will be discontinuing isotretinoin today.\\n- Reminded that it is still imperative to maintain 100% compliance with the same methods of birth control she has been using throughout this course of isotretinoin for at least 30 days beyond her final pill. \\n- Advise that a final urine pregnancy test will be necessary to complete the retirements of the [employer] program in 30-37 days from now. \\n- Advised to schedule a nurse visit for this final UPT.   \\n- Reviewed the possible expectations for her acne from here on out.\\n- Discussed maintenance options.\\n- Urine pregnant test performed today.
Cheilitis Aggressive Treatment: - Recommended application of Dr. Dan’s Cortibalm used several times per day (as often as every hour you are awake when very severe lip dryness is present).\\n- Once improved switch back to Vaseline or Aquaphor numerous times a day and before going to bed.
Retinoid Dermatitis Aggressive Treatment: I recommended more frequent application of Cetaphil or CeraVe to the areas of dermatitis. I also prescribed a topical steroid for twice daily use until the dermatitis resolves.
Detail Level: Zone
Xerosis Normal Treatment: I recommended application of Cetaphil or CeraVe numerous times a day going to bed to all dry areas.
Retinoid Dermatitis Normal Treatment: I recommended more frequent application of Cetaphil or CeraVe to the areas of dermatitis.
Hypercholesterolemia Monitoring: I explained this is common when taking isotretinoin. We will monitor closely.
Xerosis Aggressive Treatment: - Recommended application of Cetaphil or CeraVe cream numerous times a day and before going to bed to all dry areas.\\n- Also, will prescribe a topical steroid for twice daily use as needed until pruritus is resolved for never longer than 14 days per month.
Myalgia Treatment: I explained this is common when taking isotretinoin. If this worsens they will contact us. They may try OTC ibuprofen.
Upper Range (In Mg/Kg): 150
Female Pregnancy Counseling Text: Female patients should also be on two forms of birth control while taking this medication and for one month after their last dose.
Target Cumulative Dosage (In Mg/Kg): 135
Cheilitis Normal Treatment: - Recommended application of Vaseline ointment or Aquaphor numerous times a day (as often as every hour) and before going to bed.
Weight Units: pounds
What Is The Patient's Gender: Female
Retinoid Dermatitis Normal Treatment: - Recommended application of Cetaphil or CeraVe cream numerous times a day and before going to bed to all dry and rashy areas.
Retinoid Dermatitis Aggressive Treatment: - Recommended application of Cetaphil or CeraVe cream numerous times a day and before going to bed to all dry and rashy areas.\\n- Also, will prescribe a topical steroid for twice daily use as needed until pruritus is resolved for never longer than 14 days per month.
Lower Range (In Mg/Kg): 120
Kilograms Preamble Statement (Weight Entered In Details Tab): Reported Weight in kilograms:
Headache Monitoring: - Recommended monitoring the headaches for now. There is no evidence of increased intracranial pressure. They were instructed to call if the headaches are worsening.
Nosebleeds Normal Treatment: I explained this is common when taking isotretinoin. I recommended saline mist in each nostril multiple times a day. If this worsens they will contact us.
Is Cheilitis Present?: Yes - Normal Treatment
Male Completion Statement: - After discussing his treatment course we decided to discontinue isotretinoin therapy at this time.\\n- He shouldn't donate blood for one month after the last dose. He should call with any new symptoms of depression.\\n- Discussed maintenance options.
Myalgia Monitoring: I explained this is common when taking isotretinoin. If this worsens they will contact us.
Next Month's Dosage: Continue Current Dosage

## 2022-08-22 ENCOUNTER — NURSE TRIAGE (OUTPATIENT)
Dept: NURSING | Facility: CLINIC | Age: 27
End: 2022-08-22

## 2022-08-22 ENCOUNTER — HOSPITAL ENCOUNTER (EMERGENCY)
Facility: CLINIC | Age: 27
Discharge: LEFT BEFORE DISCHARGE | End: 2022-08-22
Admitting: EMERGENCY MEDICINE
Payer: COMMERCIAL

## 2022-08-22 VITALS
SYSTOLIC BLOOD PRESSURE: 132 MMHG | DIASTOLIC BLOOD PRESSURE: 101 MMHG | HEART RATE: 113 BPM | WEIGHT: 165 LBS | BODY MASS INDEX: 30.36 KG/M2 | RESPIRATION RATE: 20 BRPM | OXYGEN SATURATION: 100 % | HEIGHT: 62 IN | TEMPERATURE: 98.3 F

## 2022-08-22 LAB
FLUAV RNA SPEC QL NAA+PROBE: NEGATIVE
FLUBV RNA RESP QL NAA+PROBE: NEGATIVE
SARS-COV-2 RNA RESP QL NAA+PROBE: NEGATIVE

## 2022-08-22 PROCEDURE — 999N000104 HC STATISTIC NO CHARGE: Performed by: EMERGENCY MEDICINE

## 2022-08-22 PROCEDURE — 87636 SARSCOV2 & INF A&B AMP PRB: CPT | Performed by: EMERGENCY MEDICINE

## 2022-08-22 PROCEDURE — C9803 HOPD COVID-19 SPEC COLLECT: HCPCS | Performed by: EMERGENCY MEDICINE

## 2022-08-23 NOTE — ED NOTES
"Per note from Registration - patient did not want to speak to RN - \"will go to [facility] tomorrow\"   "

## 2022-08-23 NOTE — TELEPHONE ENCOUNTER
S/B: Pt reports SOB started yesterday,   Coughing starting last Friday- not sure of color of sputum - afebrile  Has taken 2 covid test -both negative  Chest hurting coughing 9/10  Asthma as a child  Nose running  Pt. Currently driving trying find a place to go to be seen.  A/R:  Pt to go to ER now (was already driving) plans to follow advice.     Isamar Aguilar RN on 8/22/2022 at 9:27 PM      Reason for Disposition    Chest pain  (Exception: MILD central chest pain, present only when coughing)    Additional Information    Negative: SEVERE difficulty breathing (e.g., struggling for each breath, speaks in single words)    Negative: Bluish (or gray) lips or face now    Negative: [1] Difficulty breathing AND [2] exposure to flames, smoke, or fumes    Negative: [1] Stridor AND [2] difficulty breathing    Negative: Sounds like a life-threatening emergency to the triager    Negative: [1] Previous asthma attacks AND [2] this feels like asthma attack    Negative: Dry cough (non-productive;  no sputum or minimal clear sputum)    Negative: [1] MODERATE difficulty breathing (e.g., speaks in phrases, SOB even at rest, pulse 100-120) AND [2] still present when not coughing    Protocols used: COUGH - ACUTE [id number]

## 2022-08-30 ENCOUNTER — APPOINTMENT (OUTPATIENT)
Dept: URBAN - METROPOLITAN AREA CLINIC 252 | Age: 27
Setting detail: DERMATOLOGY
End: 2022-09-05

## 2022-08-30 VITALS — WEIGHT: 160 LBS | RESPIRATION RATE: 18 BRPM | HEIGHT: 62 IN

## 2022-08-30 DIAGNOSIS — L70.0 ACNE VULGARIS: ICD-10-CM

## 2022-08-30 PROCEDURE — 99214 OFFICE O/P EST MOD 30 MIN: CPT

## 2022-08-30 PROCEDURE — 81025 URINE PREGNANCY TEST: CPT

## 2022-08-30 PROCEDURE — OTHER PRESCRIPTION: OTHER

## 2022-08-30 PROCEDURE — OTHER ISOTRETINOIN MONITORING: OTHER

## 2022-08-30 PROCEDURE — OTHER URINE PREGNANCY TEST: OTHER

## 2022-08-30 RX ORDER — ISOTRETINOIN 40 MG/1
40MG CAPSULE, LIQUID FILLED ORAL TWICE PER DAY
Qty: 60 | Refills: 0 | Status: ERX

## 2022-08-30 ASSESSMENT — LOCATION ZONE DERM: LOCATION ZONE: FACE

## 2022-08-30 ASSESSMENT — LOCATION DETAILED DESCRIPTION DERM: LOCATION DETAILED: LEFT CENTRAL MALAR CHEEK

## 2022-08-30 ASSESSMENT — LOCATION SIMPLE DESCRIPTION DERM: LOCATION SIMPLE: LEFT CHEEK

## 2022-08-30 NOTE — PROCEDURE: ISOTRETINOIN MONITORING
Myalgia Treatment: - Explained this is common when taking isotretinoin. \\n- May try OTC ibuprofen no more frequently than every 6 hours.\\n- If this worsens, then contact us.
Lower Range (In Mg/Kg): 120
Cheilitis Aggressive Treatment: I recommended application of Vaseline or Aquaphor numerous times a day (as often as every hour) and before going to bed. I also prescribed a topical steroid for twice daily use.
Cheilitis Normal Treatment: - Recommended application of Vaseline ointment or Aquaphor numerous times a day (as often as every hour) and before going to bed.
Retinoid Dermatitis Aggressive Treatment: - Recommended application of Cetaphil or CeraVe cream numerous times a day and before going to bed to all dry and rashy areas.\\n- Also, will prescribe a topical steroid for twice daily use as needed until pruritus is resolved for never longer than 14 days per month.
Hypertriglyceridemia Treatment: - I explained this is common when taking isotretinoin. \\n- Advised that taking over-the-counter fish oil can be helpful to reduce triglycerides.\\n- We will continue to monitor.
Male Completion Statement: - After discussing his treatment course we decided to discontinue isotretinoin therapy at this time.\\n- He shouldn't donate blood for one month after the last dose. He should call with any new symptoms of depression.\\n- Discussed maintenance options.
Cheilitis Normal Treatment: I recommended application of Vaseline or Aquaphor numerous times a day (as often as every hour) and before going to bed.
Show How Many Months Of Anticipated Therapy Are Left: No
Retinoid Dermatitis Aggressive Treatment: I recommended more frequent application of Cetaphil or CeraVe to the areas of dermatitis. I also prescribed a topical steroid for twice daily use until the dermatitis resolves.
Myalgia Treatment: I explained this is common when taking isotretinoin. If this worsens they will contact us. They may try OTC ibuprofen.
Xerosis Aggressive Treatment: I recommended application of Cetaphil or CeraVe numerous times a day going to bed to all dry areas. I also prescribed a topical steroid for twice daily use.
What Is The Patient's Gender: Female
Nosebleeds Normal Treatment: - I explained nosebleeds are common when taking isotretinoin. \\n- Recommended saline mist in each nostril multiple times a day. \\n- May also consider applying Aquaphor intranasally once every night.\\n- If this worsens, call the clinic. Patient expressed understanding.
Headache Monitoring: I recommended monitoring the headaches for now. There is no evidence of increased intracranial pressure. They were instructed to call if the headaches are worsening.
Myalgia Monitoring: I explained this is common when taking isotretinoin. If this worsens they will contact us.
Cheilitis Aggressive Treatment: - Recommended application of Dr. Dan’s Cortibalm used several times per day (as often as every hour you are awake when very severe lip dryness is present).\\n- Once improved switch back to Vaseline or Aquaphor numerous times a day and before going to bed.
normal...
Upper Range (In Mg/Kg): 150
Headache Monitoring: - Recommended monitoring the headaches for now. There is no evidence of increased intracranial pressure. They were instructed to call if the headaches are worsening.
Xerosis Normal Treatment: I recommended application of Cetaphil or CeraVe numerous times a day going to bed to all dry areas.
Hypercholesterolemia Monitoring: I explained this is common when taking isotretinoin. We will monitor closely.
Add Associated Diagnosis When Managing Medication Side Effects: Yes
Xerosis Aggressive Treatment: - Recommended application of Cetaphil or CeraVe cream numerous times a day and before going to bed to all dry areas.\\n- Also, will prescribe a topical steroid for twice daily use as needed until pruritus is resolved for never longer than 14 days per month.
Months Of Therapy Completed: 3
Kilograms Preamble Statement (Weight Entered In Details Tab): Reported Weight in kilograms:
Next Month's Dosage: Continue Current Dosage
Nosebleeds Normal Treatment: I explained this is common when taking isotretinoin. I recommended saline mist in each nostril multiple times a day. If this worsens they will contact us.
Retinoid Dermatitis Normal Treatment: - Recommended application of Cetaphil or CeraVe cream numerous times a day and before going to bed to all dry and rashy areas.
Xerosis Normal Treatment: - Recommended application of Cetaphil or CeraVe cream numerous times a day and before going to bed to all dry areas.
Weight Units: pounds
Female Pregnancy Counseling Text: Female patients should also be on two forms of birth control while taking this medication and for one month after their last dose.
Retinoid Dermatitis Normal Treatment: I recommended more frequent application of Cetaphil or CeraVe to the areas of dermatitis.
Detail Level: Zone
Counseling Text: I reviewed the side effect in detail. Patient should get monthly blood tests, not donate blood, not drive at night if vision affected, and not share medication.
Myalgia Monitoring: - At this point, patient reports that this is mild and tolerable. Explained this is common when taking isotretinoin. If this worsens, contact us.
Target Cumulative Dosage (In Mg/Kg): 135
Use Therapeutic Ranged Or Therapeutic Target: please select Range or Target
Is Cheilitis Present?: Yes - Normal Treatment
Pounds Preamble Statement (Weight Entered In Details Tab): Reported Weight in pounds:
Female Completion Statement: FEMALE PATIENT - FINAL VISIT \\n- Discussed that we will be discontinuing isotretinoin today.\\n- Reminded that it is still imperative to maintain 100% compliance with the same methods of birth control she has been using throughout this course of isotretinoin for at least 30 days beyond her final pill. \\n- Advise that a final urine pregnancy test will be necessary to complete the retirements of the [employer] program in 30-37 days from now. \\n- Advised to schedule a nurse visit for this final UPT.   \\n- Reviewed the possible expectations for her acne from here on out.\\n- Discussed maintenance options.\\n- Urine pregnant test performed today.
Comments: Patient reports experiencing some mild bloody noses, recommended nasal saline spray or the use of Aquaphor to the nasal passages. Patient reports dryness of the lips, purchased Dr Rm cruz.

## 2022-09-29 ENCOUNTER — APPOINTMENT (OUTPATIENT)
Dept: URBAN - METROPOLITAN AREA CLINIC 252 | Age: 27
Setting detail: DERMATOLOGY
End: 2022-10-01

## 2022-09-29 VITALS — HEIGHT: 62 IN | WEIGHT: 160 LBS | RESPIRATION RATE: 16 BRPM

## 2022-09-29 DIAGNOSIS — L259 CONTACT DERMATITIS AND OTHER ECZEMA, UNSPECIFIED CAUSE: ICD-10-CM

## 2022-09-29 PROBLEM — L30.9 DERMATITIS, UNSPECIFIED: Status: ACTIVE | Noted: 2022-09-29

## 2022-09-29 PROCEDURE — 99213 OFFICE O/P EST LOW 20 MIN: CPT

## 2022-09-29 PROCEDURE — OTHER ADDITIONAL NOTES: OTHER

## 2022-09-29 PROCEDURE — OTHER PRESCRIPTION: OTHER

## 2022-09-29 PROCEDURE — OTHER COUNSELING: OTHER

## 2022-09-29 RX ORDER — METHYLPREDNISOLONE 4 MG/1
4MG TABLET ORAL AS DIRECTED
Qty: 1 | Refills: 0 | Status: ERX | COMMUNITY
Start: 2022-09-29

## 2022-09-29 RX ORDER — TRIAMCINOLONE ACETONIDE 1 MG/G
0.1% CREAM TOPICAL BID
Qty: 45 | Refills: 0 | Status: ERX | COMMUNITY
Start: 2022-09-29

## 2022-09-29 ASSESSMENT — LOCATION DETAILED DESCRIPTION DERM
LOCATION DETAILED: RIGHT VENTRAL DISTAL FOREARM
LOCATION DETAILED: RIGHT RADIAL DORSAL HAND
LOCATION DETAILED: LEFT RADIAL DORSAL HAND
LOCATION DETAILED: LEFT VENTRAL PROXIMAL FOREARM

## 2022-09-29 ASSESSMENT — LOCATION SIMPLE DESCRIPTION DERM
LOCATION SIMPLE: RIGHT FOREARM
LOCATION SIMPLE: LEFT FOREARM
LOCATION SIMPLE: RIGHT HAND
LOCATION SIMPLE: LEFT HAND

## 2022-09-29 ASSESSMENT — LOCATION ZONE DERM
LOCATION ZONE: HAND
LOCATION ZONE: ARM

## 2022-09-29 NOTE — PROCEDURE: ADDITIONAL NOTES
Additional Notes: **reassured patient to continue with Isotretinoin.
Render Risk Assessment In Note?: no
Detail Level: Simple

## 2022-10-14 ENCOUNTER — E-VISIT (OUTPATIENT)
Dept: FAMILY MEDICINE | Facility: CLINIC | Age: 27
End: 2022-10-14
Payer: COMMERCIAL

## 2022-10-14 ENCOUNTER — TELEPHONE (OUTPATIENT)
Dept: FAMILY MEDICINE | Facility: CLINIC | Age: 27
End: 2022-10-14

## 2022-10-14 DIAGNOSIS — R05.9 COUGH, UNSPECIFIED TYPE: Primary | ICD-10-CM

## 2022-10-14 PROCEDURE — 99421 OL DIG E/M SVC 5-10 MIN: CPT | Performed by: PHYSICIAN ASSISTANT

## 2022-10-14 RX ORDER — ALBUTEROL SULFATE 90 UG/1
2 AEROSOL, METERED RESPIRATORY (INHALATION) 4 TIMES DAILY PRN
Qty: 18 G | Refills: 3 | Status: SHIPPED | OUTPATIENT
Start: 2022-10-14

## 2022-10-14 RX ORDER — ALBUTEROL SULFATE 90 UG/1
2 AEROSOL, METERED RESPIRATORY (INHALATION) 4 TIMES DAILY PRN
COMMUNITY
Start: 2022-08-24 | End: 2022-10-14

## 2022-10-14 NOTE — TELEPHONE ENCOUNTER
Patient is calling to report she was seen in an  a while ago for asthma.  She was prescribed an inhaler for this but has left it at home.    She states with the weather change and the activities she has had to do this morning along with a possible cold she has coming on, she is in need of an inhaler.    She is informed to complete an Evisit as asthma is not on her list and have it sent to PCP for review.  RN pulled the inhaler from outside med rec list and added to current list for provider info.  Closing this encounter.  Sari Howell, GENESISN, RN

## 2022-10-16 ENCOUNTER — HEALTH MAINTENANCE LETTER (OUTPATIENT)
Age: 27
End: 2022-10-16

## 2022-10-20 ENCOUNTER — APPOINTMENT (OUTPATIENT)
Dept: URBAN - METROPOLITAN AREA CLINIC 252 | Age: 27
Setting detail: DERMATOLOGY
End: 2022-10-24

## 2022-10-20 VITALS — RESPIRATION RATE: 16 BRPM | WEIGHT: 160 LBS | HEIGHT: 62 IN

## 2022-10-20 DIAGNOSIS — L70.0 ACNE VULGARIS: ICD-10-CM

## 2022-10-20 PROCEDURE — OTHER ISOTRETINOIN MONITORING: OTHER

## 2022-10-20 PROCEDURE — 99214 OFFICE O/P EST MOD 30 MIN: CPT

## 2022-10-20 PROCEDURE — OTHER URINE PREGNANCY TEST: OTHER

## 2022-10-20 PROCEDURE — OTHER PRESCRIPTION: OTHER

## 2022-10-20 PROCEDURE — 81025 URINE PREGNANCY TEST: CPT

## 2022-10-20 RX ORDER — ISOTRETINOIN 40 MG/1
40MG CAPSULE, LIQUID FILLED ORAL TWICE PER DAY
Qty: 60 | Refills: 0 | Status: ERX

## 2022-10-20 ASSESSMENT — LOCATION DETAILED DESCRIPTION DERM: LOCATION DETAILED: LEFT CENTRAL MALAR CHEEK

## 2022-10-20 ASSESSMENT — LOCATION SIMPLE DESCRIPTION DERM: LOCATION SIMPLE: LEFT CHEEK

## 2022-10-20 ASSESSMENT — LOCATION ZONE DERM: LOCATION ZONE: FACE

## 2022-10-20 NOTE — PROCEDURE: ISOTRETINOIN MONITORING
Use Therapeutic Ranged Or Therapeutic Target: please select Range or Target
Retinoid Dermatitis Normal Treatment: I recommended more frequent application of Cetaphil or CeraVe to the areas of dermatitis.
Xerosis Normal Treatment: - Recommended application of Cetaphil or CeraVe cream numerous times a day and before going to bed to all dry areas.
What Is The Patient's Gender: Female
Are Labs Available For Review?: Yes
Headache Monitoring: - Recommended monitoring the headaches for now. There is no evidence of increased intracranial pressure. They were instructed to call if the headaches are worsening.
Male Completion Statement: - After discussing his treatment course we decided to discontinue isotretinoin therapy at this time.\\n- He shouldn't donate blood for one month after the last dose. He should call with any new symptoms of depression.\\n- Discussed maintenance options.
Retinoid Dermatitis Normal Treatment: - Recommended application of Cetaphil or CeraVe cream numerous times a day and before going to bed to all dry and rashy areas.
Female Completion Statement: FEMALE PATIENT - FINAL VISIT \\n- Discussed that we will be discontinuing isotretinoin today.\\n- Reminded that it is still imperative to maintain 100% compliance with the same methods of birth control she has been using throughout this course of isotretinoin for at least 30 days beyond her final pill. \\n- Advise that a final urine pregnancy test will be necessary to complete the retirements of the [employer] program in 30-37 days from now. \\n- Advised to schedule a nurse visit for this final UPT.   \\n- Reviewed the possible expectations for her acne from here on out.\\n- Discussed maintenance options.\\n- Urine pregnant test performed today.
Hypercholesterolemia Monitoring: I explained this is common when taking isotretinoin. We will monitor closely.
Myalgia Monitoring: I explained this is common when taking isotretinoin. If this worsens they will contact us.
Display Individual Monthly Dosage In The Note (If Yes Will Display All Dosages Which Are Not N/A): no
Upper Range (In Mg/Kg): 150
Next Month's Dosage: Continue Current Dosage
Target Cumulative Dosage (In Mg/Kg): 135
Weight Units: pounds
Cheilitis Normal Treatment: - Recommended application of Vaseline ointment or Aquaphor numerous times a day (as often as every hour) and before going to bed.
Retinoid Dermatitis Aggressive Treatment: I recommended more frequent application of Cetaphil or CeraVe to the areas of dermatitis. I also prescribed a topical steroid for twice daily use until the dermatitis resolves.
Hypertriglyceridemia Monitoring: - I explained this is common when taking isotretinoin. \\n- Advised that taking over-the-counter fish oil can be helpful to reduce triglycerides.\\n- We will continue to monitor.
Cheilitis Aggressive Treatment: I recommended application of Vaseline or Aquaphor numerous times a day (as often as every hour) and before going to bed. I also prescribed a topical steroid for twice daily use.
Counseling Text: I reviewed the side effect in detail. Patient should get monthly blood tests, not donate blood, not drive at night if vision affected, and not share medication.
Cheilitis Normal Treatment: I recommended application of Vaseline or Aquaphor numerous times a day (as often as every hour) and before going to bed.
Female Pregnancy Counseling Text: Female patients should also be on two forms of birth control while taking this medication and for one month after their last dose.
Nosebleeds Normal Treatment: I explained this is common when taking isotretinoin. I recommended saline mist in each nostril multiple times a day. If this worsens they will contact us.
Xerosis Normal Treatment: I recommended application of Cetaphil or CeraVe numerous times a day going to bed to all dry areas.
Pounds Preamble Statement (Weight Entered In Details Tab): Reported Weight in pounds:
Lower Range (In Mg/Kg): 120
Cheilitis Aggressive Treatment: - Recommended application of Dr. Dan’s Cortibalm used several times per day (as often as every hour you are awake when very severe lip dryness is present).\\n- Once improved switch back to Vaseline or Aquaphor numerous times a day and before going to bed.
Retinoid Dermatitis Aggressive Treatment: - Recommended application of Cetaphil or CeraVe cream numerous times a day and before going to bed to all dry and rashy areas.\\n- Also, will prescribe a topical steroid for twice daily use as needed until pruritus is resolved for never longer than 14 days per month.
Xerosis Aggressive Treatment: I recommended application of Cetaphil or CeraVe numerous times a day going to bed to all dry areas. I also prescribed a topical steroid for twice daily use.
Is Cheilitis Present?: Yes - Normal Treatment
Myalgia Treatment: I explained this is common when taking isotretinoin. If this worsens they will contact us. They may try OTC ibuprofen.
Myalgia Treatment: - Explained this is common when taking isotretinoin. \\n- May try OTC ibuprofen no more frequently than every 6 hours.\\n- If this worsens, then contact us.
Nosebleeds Normal Treatment: - I explained nosebleeds are common when taking isotretinoin. \\n- Recommended saline mist in each nostril multiple times a day. \\n- May also consider applying Aquaphor intranasally once every night.\\n- If this worsens, call the clinic. Patient expressed understanding.
Headache Monitoring: I recommended monitoring the headaches for now. There is no evidence of increased intracranial pressure. They were instructed to call if the headaches are worsening.
Months Of Therapy Completed: 4
Myalgia Monitoring: - At this point, patient reports that this is mild and tolerable. Explained this is common when taking isotretinoin. If this worsens, contact us.
Kilograms Preamble Statement (Weight Entered In Details Tab): Reported Weight in kilograms:
Detail Level: Zone
Xerosis Aggressive Treatment: - Recommended application of Cetaphil or CeraVe cream numerous times a day and before going to bed to all dry areas.\\n- Also, will prescribe a topical steroid for twice daily use as needed until pruritus is resolved for never longer than 14 days per month.

## 2022-11-04 ENCOUNTER — OFFICE VISIT (OUTPATIENT)
Dept: GASTROENTEROLOGY | Facility: CLINIC | Age: 27
End: 2022-11-04
Attending: PHYSICIAN ASSISTANT
Payer: COMMERCIAL

## 2022-11-04 VITALS
BODY MASS INDEX: 31.28 KG/M2 | DIASTOLIC BLOOD PRESSURE: 70 MMHG | RESPIRATION RATE: 14 BRPM | WEIGHT: 171 LBS | OXYGEN SATURATION: 100 % | SYSTOLIC BLOOD PRESSURE: 118 MMHG

## 2022-11-04 DIAGNOSIS — R14.0 ABDOMINAL BLOATING: ICD-10-CM

## 2022-11-04 DIAGNOSIS — K59.00 CONSTIPATION, UNSPECIFIED CONSTIPATION TYPE: Primary | ICD-10-CM

## 2022-11-04 DIAGNOSIS — K58.1 IRRITABLE BOWEL SYNDROME WITH CONSTIPATION: ICD-10-CM

## 2022-11-04 PROCEDURE — 99214 OFFICE O/P EST MOD 30 MIN: CPT | Performed by: INTERNAL MEDICINE

## 2022-11-04 RX ORDER — LUBIPROSTONE 24 UG/1
24 CAPSULE ORAL 2 TIMES DAILY WITH MEALS
Qty: 60 CAPSULE | Refills: 3 | Status: SHIPPED | OUTPATIENT
Start: 2022-11-04 | End: 2023-06-28

## 2022-11-04 ASSESSMENT — PAIN SCALES - GENERAL: PAINLEVEL: MODERATE PAIN (4)

## 2022-11-04 NOTE — PATIENT INSTRUCTIONS
As we have discussed    1.  You can reintroduce gluten into your diet.  Gluten-free is expensive and often constipating.    2.  Amitiza 24 mcg twice daily should help with abdominal bloating and constipation.    3.  Milk of magnesia as needed is a reasonable option for episodes of constipation despite Amitiza    4.  The over the counter IBgard is a peppermint herbal treatment for intestinal cramps and may also provide some symptom relief with the small intestine    5.  Because of concern of oil we will check a pancreas stool test.    Follow-up as arranged

## 2022-11-04 NOTE — LETTER
11/4/2022         RE: Mora Maldonado  [id number] [facility] [address]        Dear Colleague,    Thank you for referring your patient, Mora Maldonado, to the [facility]. Please see a copy of my visit note below.    Gastroenterology return    27-year-old female who previously saw Dr. Garcia and had follow-up with Belen Morales in June 2021.    Patient to review predominantly abdominal bloating abdominal pain.    Patient notes lifelong stomach pain.  She did not miss school but carried on there was no particular treatment.  She is concerned that perhaps the stools have been oily more recently.  She has had more trouble over the last year and a half.  After meals she will have some malaise in addition she will have cramping in her stomach which is typically left side left upper quadrant mid to lower vertical position greater than right similar distribution up and down if she lies down there is some relief but there is no clear relief with defecation at least as sometimes this can aggravate symptoms.  Patient notes a history of constipation with a week and a half between bowel movements.  She was prescribed MiraLAX in the past but this worsened abdominal pain there is a bloating associated with this.  Family history maternal grandmother has GI issues.  Her mother has migraine headaches.  She originally avoided gluten for some relief but this was not sustained and raises a question she could introduce gluten.  In the past she was treated with Prilosec but no longer terms of reflux this is mild but perhaps 2 days a week.  There is a previous history of gallbladder disorder.  No use of aspirin or nonsteroidals.  She will use Tylenol perhaps 1 day a week.  Patient has a squatty potty at home and knows that this is okay but does not necessarily help her  reduce time on the toilet.    Past medical history: Anxiety, depression, hypothyroidism, abdominal Pap smear, lap cholecystectomy  May 2020    Medications reviewed on epic    Allergies reviewed on tribr    Social single, no tobacco occasional alcohol    June 2021 small bowel biopsies normal, fundic gland polyp on biopsy, terminal ileum normal random colon biopsies normal.  Colonoscopy for chronic diarrhea otherwise unremarkable with good prep      EGD for question of dysphagia Z-line regular at 39 cm normal esophagus otherwise negative.  No comment on HP.    Review of systems otherwise negative noncontributory    No acute distress.  Blood pressure 118/70, pulse 100, afebrile, BMI 31.28, head and neck unremarkable cardiac S1-S2 without murmur, lungs clear throughout abdomen soft nontender without organomegaly no particular tenderness, no lower lower extremity edema skin without rash    Impression: Functional intestinal disorder consider elements of nonulcer dyspepsia.  Consider IBS-C versus chronic constipation.  Predominant symptoms that bother the patient most are bloating and abdominal pain.  This may improve her with a more effective bowel program.  Patient notes MiraLAX was not well-tolerated.  Said it remains a future option however there may be other choices such as Amitiza or Linzess Linzess that more effective.  Consider fecal elastase for completeness and questionable oil.  This will replace 72hour stool testing on 100 g fecal fat diet.  Consider adding IBgard for intestinal cramps which is a peppermint that may offer some benefit.  Other prescription options were briefly considered advised reintroduction of gluten.  Gluten free is often expensive and constipating.  Suggest Mediterranean diet overall.  Some people suggested-as a diet.  Pelvic floor testing may not necessarily be beneficial at this point.  Other options for constipation and intestinal distress to be considered on an ongoing basis.  Follow-up as needed and as scheduled.      Again, thank you for allowing me to participate in the care of your patient.         Sincerely,        Stephan Bobby MD

## 2022-11-04 NOTE — PROGRESS NOTES
Gastroenterology return    27-year-old female who previously saw Dr. Garcia and had follow-up with Belen Morales in June 2021.    Patient to review predominantly abdominal bloating abdominal pain.    Patient notes lifelong stomach pain.  She did not miss school but carried on there was no particular treatment.  She is concerned that perhaps the stools have been oily more recently.  She has had more trouble over the last year and a half.  After meals she will have some malaise in addition she will have cramping in her stomach which is typically left side left upper quadrant mid to lower vertical position greater than right similar distribution up and down if she lies down there is some relief but there is no clear relief with defecation at least as sometimes this can aggravate symptoms.  Patient notes a history of constipation with a week and a half between bowel movements.  She was prescribed MiraLAX in the past but this worsened abdominal pain there is a bloating associated with this.  Family history maternal grandmother has GI issues.  Her mother has migraine headaches.  She originally avoided gluten for some relief but this was not sustained and raises a question she could introduce gluten.  In the past she was treated with Prilosec but no longer terms of reflux this is mild but perhaps 2 days a week.  There is a previous history of gallbladder disorder.  No use of aspirin or nonsteroidals.  She will use Tylenol perhaps 1 day a week.  Patient has a squatty potty at home and knows that this is okay but does not necessarily help her  reduce time on the toilet.    Past medical history: Anxiety, depression, hypothyroidism, abdominal Pap smear, lap cholecystectomy May 2020    Medications reviewed on epic    Allergies reviewed on Psychiatric    Social single, no tobacco occasional alcohol    June 2021 small bowel biopsies normal, fundic gland polyp on biopsy, terminal ileum normal random colon biopsies normal.  Colonoscopy  for chronic diarrhea otherwise unremarkable with good prep      EGD for question of dysphagia Z-line regular at 39 cm normal esophagus otherwise negative.  No comment on HP.    Review of systems otherwise negative noncontributory    No acute distress.  Blood pressure 118/70, pulse 100, afebrile, BMI 31.28, head and neck unremarkable cardiac S1-S2 without murmur, lungs clear throughout abdomen soft nontender without organomegaly no particular tenderness, no lower lower extremity edema skin without rash    Impression: Functional intestinal disorder consider elements of nonulcer dyspepsia.  Consider IBS-C versus chronic constipation.  Predominant symptoms that bother the patient most are bloating and abdominal pain.  This may improve her with a more effective bowel program.  Patient notes MiraLAX was not well-tolerated.  Said it remains a future option however there may be other choices such as Amitiza or Linzess Linzess that more effective.  Consider fecal elastase for completeness and questionable oil.  This will replace 72hour stool testing on 100 g fecal fat diet.  Consider adding IBgard for intestinal cramps which is a peppermint that may offer some benefit.  Other prescription options were briefly considered advised reintroduction of gluten.  Gluten free is often expensive and constipating.  Suggest Mediterranean diet overall.  Some people suggested-as a diet.  Pelvic floor testing may not necessarily be beneficial at this point.  Other options for constipation and intestinal distress to be considered on an ongoing basis.  Follow-up as needed and as scheduled.

## 2022-11-26 DIAGNOSIS — E66.811 CLASS 1 OBESITY WITHOUT SERIOUS COMORBIDITY IN ADULT, UNSPECIFIED BMI, UNSPECIFIED OBESITY TYPE: ICD-10-CM

## 2022-11-29 RX ORDER — TOPIRAMATE 50 MG/1
TABLET, FILM COATED ORAL
Qty: 90 TABLET | Refills: 1 | Status: SHIPPED | OUTPATIENT
Start: 2022-11-29 | End: 2023-05-25

## 2022-11-29 NOTE — TELEPHONE ENCOUNTER
Routing refill request to provider for review/approval because:    Requested Prescriptions   Pending Prescriptions Disp Refills    topiramate (TOPAMAX) 50 MG tablet [Pharmacy Med Name: TOPIRAMATE 50 MG TABLET] 90 tablet 1     Sig: TAKE 1 TABLET BY MOUTH EVERY DAY       Anti-Seizure Meds Protocol  Failed - 11/26/2022  7:47 AM        Failed - Review Authorizing provider's last note.      Refer to last progress notes: confirm request is for original authorizing provider (cannot be through other providers).          Failed - Normal ALT or AST on file in past 26 months     Recent Labs   Lab Test 05/19/20 2118   ALT 31     Recent Labs   Lab Test 05/19/20 2118   AST 19

## 2022-12-19 ENCOUNTER — APPOINTMENT (OUTPATIENT)
Dept: URBAN - METROPOLITAN AREA CLINIC 252 | Age: 27
Setting detail: DERMATOLOGY
End: 2022-12-22

## 2022-12-19 VITALS — WEIGHT: 160 LBS | HEIGHT: 62 IN

## 2022-12-19 DIAGNOSIS — L70.0 ACNE VULGARIS: ICD-10-CM

## 2022-12-19 DIAGNOSIS — L71.0 PERIORAL DERMATITIS: ICD-10-CM

## 2022-12-19 PROCEDURE — OTHER PRESCRIPTION: OTHER

## 2022-12-19 PROCEDURE — 81025 URINE PREGNANCY TEST: CPT

## 2022-12-19 PROCEDURE — 99214 OFFICE O/P EST MOD 30 MIN: CPT

## 2022-12-19 PROCEDURE — OTHER ADDITIONAL NOTES: OTHER

## 2022-12-19 PROCEDURE — OTHER ISOTRETINOIN MONITORING: OTHER

## 2022-12-19 PROCEDURE — OTHER COUNSELING: OTHER

## 2022-12-19 PROCEDURE — OTHER URINE PREGNANCY TEST: OTHER

## 2022-12-19 RX ORDER — METRONIDAZOLE 7.5 MG/G
0.75% GEL TOPICAL BID
Qty: 45 | Refills: 2 | Status: ERX | COMMUNITY
Start: 2022-12-19

## 2022-12-19 RX ORDER — ISOTRETINOIN 40 MG/1
40MG CAPSULE, LIQUID FILLED ORAL TWICE PER DAY
Qty: 60 | Refills: 0 | Status: ERX

## 2022-12-19 ASSESSMENT — LOCATION SIMPLE DESCRIPTION DERM
LOCATION SIMPLE: LEFT LIP
LOCATION SIMPLE: RIGHT CHEEK
LOCATION SIMPLE: RIGHT LIP
LOCATION SIMPLE: LEFT CHEEK

## 2022-12-19 ASSESSMENT — LOCATION ZONE DERM
LOCATION ZONE: LIP
LOCATION ZONE: FACE

## 2022-12-19 ASSESSMENT — LOCATION DETAILED DESCRIPTION DERM
LOCATION DETAILED: RIGHT CENTRAL BUCCAL CHEEK
LOCATION DETAILED: LEFT CENTRAL BUCCAL CHEEK
LOCATION DETAILED: LEFT LOWER CUTANEOUS LIP
LOCATION DETAILED: RIGHT UPPER CUTANEOUS LIP
LOCATION DETAILED: LEFT CENTRAL MALAR CHEEK

## 2022-12-19 NOTE — PROCEDURE: ISOTRETINOIN MONITORING
Myalgia Treatment: - Explained this is common when taking isotretinoin. \\n- May try OTC ibuprofen no more frequently than every 6 hours.\\n- If this worsens, then contact us.
Hypercholesterolemia Monitoring: I explained this is common when taking isotretinoin. We will monitor closely.
Xerosis Aggressive Treatment: I recommended application of Cetaphil or CeraVe numerous times a day going to bed to all dry areas. I also prescribed a topical steroid for twice daily use.
Show Text Field For Brand Names Of Contraception?: Yes
Kilograms Preamble Statement (Weight Entered In Details Tab): Reported Weight in kilograms:
Female Pregnancy Counseling Text: Female patients should also be on two forms of birth control while taking this medication and for one month after their last dose.
Headache Monitoring: I recommended monitoring the headaches for now. There is no evidence of increased intracranial pressure. They were instructed to call if the headaches are worsening.
Female Completion Statement: FEMALE PATIENT - FINAL VISIT \\n- Discussed that we will be discontinuing isotretinoin today.\\n- Reminded that it is still imperative to maintain 100% compliance with the same methods of birth control she has been using throughout this course of isotretinoin for at least 30 days beyond her final pill. \\n- Advise that a final urine pregnancy test will be necessary to complete the retirements of the iPledge program in 30-37 days from now. \\n- Advised to schedule a nurse visit for this final UPT.   \\n- Reviewed the possible expectations for her acne from here on out.\\n- Discussed maintenance options.\\n- Urine pregnant test performed today.
Male Completion Statement: - After discussing his treatment course we decided to discontinue isotretinoin therapy at this time.\\n- He shouldn't donate blood for one month after the last dose. He should call with any new symptoms of depression.\\n- Discussed maintenance options.
Retinoid Dermatitis Normal Treatment: I recommended more frequent application of Cetaphil or CeraVe to the areas of dermatitis.
Weight Units: pounds
Retinoid Dermatitis Normal Treatment: - Recommended application of Cetaphil or CeraVe cream numerous times a day and before going to bed to all dry and rashy areas.
Use Therapeutic Ranged Or Therapeutic Target: please select Range or Target
Cheilitis Aggressive Treatment: - Recommended application of Dr. Dan’s Cortibalm used several times per day (as often as every hour you are awake when very severe lip dryness is present).\\n- Once improved switch back to Vaseline or Aquaphor numerous times a day and before going to bed.
Counseling Text: I reviewed the side effect in detail. Patient should get monthly blood tests, not donate blood, not drive at night if vision affected, and not share medication.
Myalgia Monitoring: I explained this is common when taking isotretinoin. If this worsens they will contact us.
Myalgia Treatment: I explained this is common when taking isotretinoin. If this worsens they will contact us. They may try OTC ibuprofen.
Use Enhanced Counseling Feature (Automatic): No
Myalgia Monitoring: - At this point, patient reports that this is mild and tolerable. Explained this is common when taking isotretinoin. If this worsens, contact us.
Upper Range (In Mg/Kg): 150
Is Cheilitis Present?: Yes - Normal Treatment
Retinoid Dermatitis Aggressive Treatment: I recommended more frequent application of Cetaphil or CeraVe to the areas of dermatitis. I also prescribed a topical steroid for twice daily use until the dermatitis resolves.
Nosebleeds Normal Treatment: - I explained nosebleeds are common when taking isotretinoin. \\n- Recommended saline mist in each nostril multiple times a day. \\n- May also consider applying Aquaphor intranasally once every night.\\n- If this worsens, call the clinic. Patient expressed understanding.
Xerosis Normal Treatment: I recommended application of Cetaphil or CeraVe numerous times a day going to bed to all dry areas.
Next Month's Dosage: Continue Current Dosage
What Is The Patient's Gender: Female
Target Cumulative Dosage (In Mg/Kg): 135
Pounds Preamble Statement (Weight Entered In Details Tab): Reported Weight in pounds:
Nosebleeds Normal Treatment: I explained this is common when taking isotretinoin. I recommended saline mist in each nostril multiple times a day. If this worsens they will contact us.
Xerosis Aggressive Treatment: - Recommended application of Cetaphil or CeraVe cream numerous times a day and before going to bed to all dry areas.\\n- Also, will prescribe a topical steroid for twice daily use as needed until pruritus is resolved for never longer than 14 days per month.
Hypertriglyceridemia Treatment: - I explained this is common when taking isotretinoin. \\n- Advised that taking over-the-counter fish oil can be helpful to reduce triglycerides.\\n- We will continue to monitor.
Cheilitis Normal Treatment: I recommended application of Vaseline or Aquaphor numerous times a day (as often as every hour) and before going to bed.
Retinoid Dermatitis Aggressive Treatment: - Recommended application of Cetaphil or CeraVe cream numerous times a day and before going to bed to all dry and rashy areas.\\n- Also, will prescribe a topical steroid for twice daily use as needed until pruritus is resolved for never longer than 14 days per month.
Headache Monitoring: - Recommended monitoring the headaches for now. There is no evidence of increased intracranial pressure. They were instructed to call if the headaches are worsening.
Cheilitis Normal Treatment: - Recommended application of Vaseline ointment or Aquaphor numerous times a day (as often as every hour) and before going to bed.
Detail Level: Zone
Lower Range (In Mg/Kg): 120
Cheilitis Aggressive Treatment: I recommended application of Vaseline or Aquaphor numerous times a day (as often as every hour) and before going to bed. I also prescribed a topical steroid for twice daily use.
Months Of Therapy Completed: 5
Xerosis Normal Treatment: - Recommended application of Cetaphil or CeraVe cream numerous times a day and before going to bed to all dry areas.

## 2022-12-19 NOTE — PROCEDURE: COUNSELING
Detail Level: Simple
Patient Specific Counseling (Will Not Stick From Patient To Patient): - Start Metronidazole 0.75% gel BID until POD resolves, then once a day for acne/pod maintenance.

## 2022-12-19 NOTE — PROCEDURE: ADDITIONAL NOTES
Additional Notes: Will plan on fasting labs next month.
Render Risk Assessment In Note?: no
Detail Level: Simple

## 2022-12-19 NOTE — HPI: MEDICATION (ACCUTANE)
Is This A New Presentation, Or A Follow-Up?: Follow Up Accutane
Additional History: Breakouts this month, ran out of medication

## 2023-01-06 ENCOUNTER — E-VISIT (OUTPATIENT)
Dept: FAMILY MEDICINE | Facility: CLINIC | Age: 28
End: 2023-01-06
Payer: COMMERCIAL

## 2023-01-06 DIAGNOSIS — R30.0 DYSURIA: Primary | ICD-10-CM

## 2023-01-06 PROCEDURE — 99421 OL DIG E/M SVC 5-10 MIN: CPT | Performed by: PHYSICIAN ASSISTANT

## 2023-01-06 NOTE — PATIENT INSTRUCTIONS
Dear Mora Maldonado,     After reviewing your responses, I would like you to come in for a urine test to make sure we treat you correctly. This urine test is to evaluate you for a possible urinary tract infection, and should be scheduled for today or tomorrow. Schedule a Lab Only appointment here.     Lab appointments are not available at most locations on the weekends. If no Lab Only appointment is available, you should be seen in any of our convenient Walk-in or Urgent Care Centers, which can be found on our website here.     You will receive instructions with your results in [employer] once they are available.     If your symptoms worsen, you develop pain in your back or stomach, develop fevers, or are not improving in 5 days, please contact your primary care provider for an appointment or visit a Walk-in or Urgent Care Center to be seen.     Thanks again for choosing us as your health care partner,     Amrita Rose PA-C

## 2023-01-23 ENCOUNTER — APPOINTMENT (OUTPATIENT)
Dept: URBAN - METROPOLITAN AREA CLINIC 252 | Age: 28
Setting detail: DERMATOLOGY
End: 2023-01-26

## 2023-01-23 VITALS — HEIGHT: 62 IN | WEIGHT: 180 LBS

## 2023-01-23 DIAGNOSIS — R21 RASH AND OTHER NONSPECIFIC SKIN ERUPTION: ICD-10-CM

## 2023-01-23 DIAGNOSIS — L70.0 ACNE VULGARIS: ICD-10-CM

## 2023-01-23 PROCEDURE — 99214 OFFICE O/P EST MOD 30 MIN: CPT

## 2023-01-23 PROCEDURE — OTHER ISOTRETINOIN MONITORING: OTHER

## 2023-01-23 PROCEDURE — 81025 URINE PREGNANCY TEST: CPT

## 2023-01-23 PROCEDURE — OTHER COUNSELING: OTHER

## 2023-01-23 PROCEDURE — OTHER URINE PREGNANCY TEST: OTHER

## 2023-01-23 PROCEDURE — OTHER PRESCRIPTION: OTHER

## 2023-01-23 RX ORDER — ISOTRETINOIN 40 MG/1
40MG CAPSULE, LIQUID FILLED ORAL
Qty: 30 | Refills: 0 | Status: ERX

## 2023-01-23 RX ORDER — HYDROCORTISONE 25 MG/G
2.5% CREAM TOPICAL BID
Qty: 30 | Refills: 0 | Status: ERX | COMMUNITY
Start: 2023-01-23

## 2023-01-23 RX ORDER — TRIAMCINOLONE ACETONIDE 1 MG/G
0.1% CREAM TOPICAL BID
Qty: 80 | Refills: 0 | Status: ERX

## 2023-01-23 ASSESSMENT — LOCATION SIMPLE DESCRIPTION DERM: LOCATION SIMPLE: LEFT CHEEK

## 2023-01-23 ASSESSMENT — LOCATION ZONE DERM: LOCATION ZONE: FACE

## 2023-01-23 ASSESSMENT — LOCATION DETAILED DESCRIPTION DERM: LOCATION DETAILED: LEFT CENTRAL MALAR CHEEK

## 2023-01-23 NOTE — PROCEDURE: ISOTRETINOIN MONITORING
Xerosis Aggressive Treatment: - Recommended application of Cetaphil or CeraVe cream numerous times a day and before going to bed to all dry areas.\\n- Also, will prescribe a topical steroid for twice daily use as needed until pruritus is resolved for never longer than 14 days per month.
Nosebleeds Normal Treatment: - I explained nosebleeds are common when taking isotretinoin. \\n- Recommended saline mist in each nostril multiple times a day. \\n- May also consider applying Aquaphor intranasally once every night.\\n- If this worsens, call the clinic. Patient expressed understanding.
Myalgia Monitoring: I explained this is common when taking isotretinoin. If this worsens they will contact us.
Display Individual Monthly Dosage In The Note (If Yes Will Display All Dosages Which Are Not N/A): no
Counseling Text: I reviewed the side effect in detail. Patient should get monthly blood tests, not donate blood, not drive at night if vision affected, and not share medication.
Cheilitis Aggressive Treatment: I recommended application of Vaseline or Aquaphor numerous times a day (as often as every hour) and before going to bed. I also prescribed a topical steroid for twice daily use.
Female Completion Statement: FEMALE PATIENT - FINAL VISIT \\n- Discussed that we will be discontinuing isotretinoin today.\\n- Reminded that it is still imperative to maintain 100% compliance with the same methods of birth control she has been using throughout this course of isotretinoin for at least 30 days beyond her final pill. \\n- Advise that a final urine pregnancy test will be necessary to complete the retirements of the iPledge program in 30-37 days from now. \\n- Advised to schedule a nurse visit for this final UPT.   \\n- Reviewed the possible expectations for her acne from here on out.\\n- Discussed maintenance options.\\n- Urine pregnant test performed today.
Myalgia Treatment: - Explained this is common when taking isotretinoin. \\n- May try OTC ibuprofen no more frequently than every 6 hours.\\n- If this worsens, then contact us.
Detail Level: Zone
Use Therapeutic Ranged Or Therapeutic Target: please select Range or Target
Months Of Therapy Completed: 6
Hypercholesterolemia Monitoring: I explained this is common when taking isotretinoin. We will monitor closely.
Retinoid Dermatitis Aggressive Treatment: - Recommended application of Cetaphil or CeraVe cream numerous times a day and before going to bed to all dry and rashy areas.\\n- Also, will prescribe a topical steroid for twice daily use as needed until pruritus is resolved for never longer than 14 days per month.
What Is The Patient's Gender: Female
Headache Monitoring: - Recommended monitoring the headaches for now. There is no evidence of increased intracranial pressure. They were instructed to call if the headaches are worsening.
Retinoid Dermatitis Normal Treatment: - Recommended application of Cetaphil or CeraVe cream numerous times a day and before going to bed to all dry and rashy areas.
Xerosis Normal Treatment: I recommended application of Cetaphil or CeraVe numerous times a day going to bed to all dry areas.
Headache Monitoring: I recommended monitoring the headaches for now. There is no evidence of increased intracranial pressure. They were instructed to call if the headaches are worsening.
Male Completion Statement: - After discussing his treatment course we decided to discontinue isotretinoin therapy at this time.\\n- He shouldn't donate blood for one month after the last dose. He should call with any new symptoms of depression.\\n- Discussed maintenance options.
Cheilitis Aggressive Treatment: - Recommended application of Dr. Dan’s Cortibalm used several times per day (as often as every hour you are awake when very severe lip dryness is present).\\n- Once improved switch back to Vaseline or Aquaphor numerous times a day and before going to bed.
Weight Units: pounds
Add High Risk Medication Management Associated Diagnosis?: Yes
Pounds Preamble Statement (Weight Entered In Details Tab): Reported Weight in pounds:
Kilograms Preamble Statement (Weight Entered In Details Tab): Reported Weight in kilograms:
Lower Range (In Mg/Kg): 120
Cheilitis Normal Treatment: - Recommended application of Vaseline ointment or Aquaphor numerous times a day (as often as every hour) and before going to bed.
Hypertriglyceridemia Treatment: - I explained this is common when taking isotretinoin. \\n- Advised that taking over-the-counter fish oil can be helpful to reduce triglycerides.\\n- We will continue to monitor.
Target Cumulative Dosage (In Mg/Kg): 135
Female Pregnancy Counseling Text: Female patients should also be on two forms of birth control while taking this medication and for one month after their last dose.
Upper Range (In Mg/Kg): 150
Retinoid Dermatitis Aggressive Treatment: I recommended more frequent application of Cetaphil or CeraVe to the areas of dermatitis. I also prescribed a topical steroid for twice daily use until the dermatitis resolves.
Retinoid Dermatitis Normal Treatment: I recommended more frequent application of Cetaphil or CeraVe to the areas of dermatitis.
Nosebleeds Normal Treatment: I explained this is common when taking isotretinoin. I recommended saline mist in each nostril multiple times a day. If this worsens they will contact us.
Cheilitis Normal Treatment: I recommended application of Vaseline or Aquaphor numerous times a day (as often as every hour) and before going to bed.
Is Cheilitis Present?: Yes - Normal Treatment
Myalgia Treatment: I explained this is common when taking isotretinoin. If this worsens they will contact us. They may try OTC ibuprofen.
Xerosis Normal Treatment: - Recommended application of Cetaphil or CeraVe cream numerous times a day and before going to bed to all dry areas.
Next Month's Dosage: Continue Current Dosage
Myalgia Monitoring: - At this point, patient reports that this is mild and tolerable. Explained this is common when taking isotretinoin. If this worsens, contact us.
Xerosis Aggressive Treatment: I recommended application of Cetaphil or CeraVe numerous times a day going to bed to all dry areas. I also prescribed a topical steroid for twice daily use.

## 2023-01-23 NOTE — HPI: MEDICATION (ACCUTANE)
How Severe Is It?: moderate
Is This A New Presentation, Or A Follow-Up?: Follow Up Accutane
Additional History: Increase itchy\\nPicking at acne\\nSeasonal allergies sneezing \\nDry cough \\nSick a few weeks ago\\nAvoiding pop , [employer] high sugar

## 2023-01-23 NOTE — PROCEDURE: COUNSELING
Patient Specific Counseling (Will Not Stick From Patient To Patient): - Rec an antihistamine daily for itch, rec Allegra vs. Zyrtec.\\n- Start TMC 0.1% cream and hydrocortisone 2.5% on face.
Detail Level: Detailed

## 2023-04-07 ENCOUNTER — APPOINTMENT (OUTPATIENT)
Dept: URBAN - METROPOLITAN AREA CLINIC 252 | Age: 28
Setting detail: DERMATOLOGY
End: 2023-04-07

## 2023-04-07 VITALS — HEIGHT: 62 IN | WEIGHT: 180 LBS

## 2023-04-07 DIAGNOSIS — L70.0 ACNE VULGARIS: ICD-10-CM

## 2023-04-07 PROCEDURE — OTHER URINE PREGNANCY TEST: OTHER

## 2023-04-07 PROCEDURE — 36415 COLL VENOUS BLD VENIPUNCTURE: CPT

## 2023-04-07 PROCEDURE — OTHER PRESCRIPTION: OTHER

## 2023-04-07 PROCEDURE — 81025 URINE PREGNANCY TEST: CPT

## 2023-04-07 PROCEDURE — OTHER MIPS QUALITY: OTHER

## 2023-04-07 PROCEDURE — OTHER ISOTRETINOIN MONITORING: OTHER

## 2023-04-07 PROCEDURE — OTHER VENIPUNCTURE: OTHER

## 2023-04-07 PROCEDURE — OTHER ORDER TESTS: OTHER

## 2023-04-07 PROCEDURE — 99214 OFFICE O/P EST MOD 30 MIN: CPT

## 2023-04-07 RX ORDER — ISOTRETINOIN 40 MG/1
40MG CAPSULE, LIQUID FILLED ORAL
Qty: 30 | Refills: 0 | Status: ERX

## 2023-04-07 ASSESSMENT — LOCATION ZONE DERM
LOCATION ZONE: ARM
LOCATION ZONE: FACE

## 2023-04-07 ASSESSMENT — LOCATION DETAILED DESCRIPTION DERM
LOCATION DETAILED: LEFT CENTRAL MALAR CHEEK
LOCATION DETAILED: RIGHT ANTECUBITAL SKIN

## 2023-04-07 ASSESSMENT — LOCATION SIMPLE DESCRIPTION DERM
LOCATION SIMPLE: RIGHT UPPER ARM
LOCATION SIMPLE: LEFT CHEEK

## 2023-04-07 NOTE — HPI: MEDICATION (ISOTRETINOIN)
How Severe Is It?: moderate
Is This A New Presentation, Or A Follow-Up?: Follow Up Isotretinoin
Additional History: Patient reports 100% compliance with both forms of birth control. Patient is experiencing the side effect of dryness, but reports that the dryness is tolerable and mangeable. Patient is experiencing the side effect of cheilitis but this manageable. Patient denies any mood changes including denying depression/sadness and denies diarrhea or abdominal discomfort. had some rashiness on right hand and treats as needed with triamcinolone 0.1% cream. Uses tmc once per months. The qd dosing helps the dryness. She picks at her spot. 13 new pimples this past month. She reports improvements u til this more recent flare 1 week ago. Ran out of pills 2 days ago.  Patient is fasting today. Last appointment was January so she has been discontinue from ipledge and need to be reregistered today.

## 2023-04-07 NOTE — PROCEDURE: ISOTRETINOIN MONITORING
Pounds Preamble Statement (Weight Entered In Details Tab): Reported Weight in pounds:
Target Cumulative Dosage (In Mg/Kg): 135
Female Pregnancy Counseling Text: Female patients should also be on two forms of birth control while taking this medication and for one month after their last dose.
Myalgia Treatment: - Explained this is common when taking isotretinoin. \\n- May try OTC ibuprofen no more frequently than every 6 hours.\\n- If this worsens, then contact us.
Myalgia Treatment: I explained this is common when taking isotretinoin. If this worsens they will contact us. They may try OTC ibuprofen.
Patient Weight (Optional But Required For Cumulative Dose-Numbers And Decimals Only): 180
Hypertriglyceridemia Monitoring: I explained this is common when taking isotretinoin. We will monitor closely.
Nosebleeds Normal Treatment: - I explained nosebleeds are common when taking isotretinoin. \\n- Recommended saline mist in each nostril multiple times a day. \\n- May also consider applying Aquaphor intranasally once every night.\\n- If this worsens, call the clinic. Patient expressed understanding.
Cheilitis Aggressive Treatment: I recommended application of Vaseline or Aquaphor numerous times a day (as often as every hour) and before going to bed. I also prescribed a topical steroid for twice daily use.
Calculate Months Of Therapy Based On Documented Dosages (Will Hide Months Of Therapy Question)?: No
Retinoid Dermatitis Normal Treatment: - Recommended application of Cetaphil or CeraVe cream numerous times a day and before going to bed to all dry and rashy areas.
Counseling Text: I reviewed the side effect in detail. Patient should get monthly blood tests, not donate blood, not drive at night if vision affected, and not share medication.
Add High Risk Medication Management Associated Diagnosis?: Yes
Cheilitis Normal Treatment: I recommended application of Vaseline or Aquaphor numerous times a day (as often as every hour) and before going to bed.
Headache Monitoring: I recommended monitoring the headaches for now. There is no evidence of increased intracranial pressure. They were instructed to call if the headaches are worsening.
Lower Range (In Mg/Kg): 120
Months Of Therapy Completed: 7
Use Therapeutic Ranged Or Therapeutic Target: please select Range or Target
Cheilitis Normal Treatment: - Recommended application of Vaseline ointment or Aquaphor numerous times a day (as often as every hour) and before going to bed.
Female Completion Statement: FEMALE PATIENT - FINAL VISIT \\n- Discussed that we will be discontinuing isotretinoin today.\\n- Reminded that it is still imperative to maintain 100% compliance with the same methods of birth control she has been using throughout this course of isotretinoin for at least 30 days beyond her final pill. \\n- Advise that a final urine pregnancy test will be necessary to complete the retirements of the [employer] program in 30-37 days from now. \\n- Advised to schedule a nurse visit for this final UPT.   \\n- Reviewed the possible expectations for her acne from here on out.\\n- Discussed maintenance options.\\n- Urine pregnant test performed today.
Male Completion Statement: - After discussing his treatment course we decided to discontinue isotretinoin therapy at this time.\\n- He shouldn't donate blood for one month after the last dose. He should call with any new symptoms of depression.\\n- Discussed maintenance options.
Next Month's Dosage: Continue Current Dosage
Retinoid Dermatitis Normal Treatment: I recommended more frequent application of Cetaphil or CeraVe to the areas of dermatitis.
Myalgia Monitoring: - At this point, patient reports that this is mild and tolerable. Explained this is common when taking isotretinoin. If this worsens, contact us.
Nosebleeds Normal Treatment: I explained this is common when taking isotretinoin. I recommended saline mist in each nostril multiple times a day. If this worsens they will contact us.
Detail Level: Zone
Xerosis Normal Treatment: I recommended application of Cetaphil or CeraVe numerous times a day going to bed to all dry areas.
Myalgia Monitoring: I explained this is common when taking isotretinoin. If this worsens they will contact us.
Xerosis Aggressive Treatment: - Recommended application of Cetaphil or CeraVe cream numerous times a day and before going to bed to all dry areas.\\n- Also, will prescribe a topical steroid for twice daily use as needed until pruritus is resolved for never longer than 14 days per month.
Xerosis Aggressive Treatment: I recommended application of Cetaphil or CeraVe numerous times a day going to bed to all dry areas. I also prescribed a topical steroid for twice daily use.
Weight Units: pounds
Retinoid Dermatitis Aggressive Treatment: I recommended more frequent application of Cetaphil or CeraVe to the areas of dermatitis. I also prescribed a topical steroid for twice daily use until the dermatitis resolves.
Kilograms Preamble Statement (Weight Entered In Details Tab): Reported Weight in kilograms:
Upper Range (In Mg/Kg): 150
Retinoid Dermatitis Aggressive Treatment: - Recommended application of Cetaphil or CeraVe cream numerous times a day and before going to bed to all dry and rashy areas.\\n- Also, will prescribe a topical steroid for twice daily use as needed until pruritus is resolved for never longer than 14 days per month.
Is Cheilitis Present?: Yes - Normal Treatment
Hypertriglyceridemia Treatment: - I explained this is common when taking isotretinoin. \\n- Advised that taking over-the-counter fish oil can be helpful to reduce triglycerides.\\n- We will continue to monitor.
What Is The Patient's Gender: Female
Cheilitis Aggressive Treatment: - Recommended application of Dr. Dan?s Cortibalm used several times per day (as often as every hour you are awake when very severe lip dryness is present).\\n- Once improved switch back to Vaseline or Aquaphor numerous times a day and before going to bed.
Xerosis Normal Treatment: - Recommended application of Cetaphil or CeraVe cream numerous times a day and before going to bed to all dry areas.
Headache Monitoring: - Recommended monitoring the headaches for now. There is no evidence of increased intracranial pressure. They were instructed to call if the headaches are worsening.

## 2023-05-02 DIAGNOSIS — K59.00 CONSTIPATION, UNSPECIFIED CONSTIPATION TYPE: ICD-10-CM

## 2023-05-02 DIAGNOSIS — R14.0 ABDOMINAL BLOATING: ICD-10-CM

## 2023-05-03 ENCOUNTER — PATIENT OUTREACH (OUTPATIENT)
Dept: CARE COORDINATION | Facility: CLINIC | Age: 28
End: 2023-05-03
Payer: COMMERCIAL

## 2023-05-11 ENCOUNTER — APPOINTMENT (OUTPATIENT)
Dept: URBAN - METROPOLITAN AREA CLINIC 252 | Age: 28
Setting detail: DERMATOLOGY
End: 2023-05-11

## 2023-05-11 VITALS — HEIGHT: 62 IN | WEIGHT: 180 LBS

## 2023-05-11 DIAGNOSIS — L70.0 ACNE VULGARIS: ICD-10-CM

## 2023-05-11 DIAGNOSIS — L50.1 IDIOPATHIC URTICARIA: ICD-10-CM

## 2023-05-11 PROCEDURE — 99214 OFFICE O/P EST MOD 30 MIN: CPT

## 2023-05-11 PROCEDURE — OTHER ISOTRETINOIN MONITORING: OTHER

## 2023-05-11 PROCEDURE — OTHER COUNSELING: OTHER

## 2023-05-11 PROCEDURE — OTHER URINE PREGNANCY TEST: OTHER

## 2023-05-11 PROCEDURE — OTHER PRESCRIPTION: OTHER

## 2023-05-11 PROCEDURE — 81025 URINE PREGNANCY TEST: CPT

## 2023-05-11 PROCEDURE — OTHER MIPS QUALITY: OTHER

## 2023-05-11 RX ORDER — ISOTRETINOIN 20 MG/1
20MG CAPSULE, LIQUID FILLED ORAL
Qty: 30 | Refills: 0 | Status: ERX | COMMUNITY
Start: 2023-05-11

## 2023-05-11 ASSESSMENT — LOCATION DETAILED DESCRIPTION DERM
LOCATION DETAILED: LEFT ANTERIOR PROXIMAL THIGH
LOCATION DETAILED: LEFT CENTRAL MALAR CHEEK
LOCATION DETAILED: RIGHT ANTERIOR PROXIMAL THIGH

## 2023-05-11 ASSESSMENT — LOCATION ZONE DERM
LOCATION ZONE: LEG
LOCATION ZONE: FACE

## 2023-05-11 ASSESSMENT — LOCATION SIMPLE DESCRIPTION DERM
LOCATION SIMPLE: LEFT CHEEK
LOCATION SIMPLE: RIGHT THIGH
LOCATION SIMPLE: LEFT THIGH

## 2023-05-11 NOTE — HPI: MEDICATION (ISOTRETINOIN)
Is This A New Presentation, Or A Follow-Up?: Follow Up Isotretinoin
Additional History: Patient reports 100% compliance with both forms of birth control. Regegistered at last office visit since she was lost to follow-up after January. 1 pimple since last office visit. She had been taking the isotretinoin leading up to last office visit.

## 2023-05-11 NOTE — PROCEDURE: ISOTRETINOIN MONITORING
Xerosis Normal Treatment: - Recommended application of Cetaphil or CeraVe cream numerous times a day and before going to bed to all dry areas.
Kilograms Preamble Statement (Weight Entered In Details Tab): Reported Weight in kilograms:
Myalgia Treatment: - Explained this is common when taking isotretinoin. \\n- May try OTC ibuprofen no more frequently than every 6 hours.\\n- If this worsens, then contact us.
Add Associated Diagnosis When Managing Medication Side Effects: Yes
Retinoid Dermatitis Normal Treatment: - Recommended application of Cetaphil or CeraVe cream numerous times a day and before going to bed to all dry and rashy areas.
Male Completion Statement: - After discussing his treatment course we decided to discontinue isotretinoin therapy at this time.\\n- He shouldn't donate blood for one month after the last dose. He should call with any new symptoms of depression.\\n- Discussed maintenance options.
Is Cheilitis Present?: Yes - Normal Treatment
Comments: - Recommended decreasing dose today from 40mg QD to 20mg QD.
Months Of Therapy Completed: 8
Xerosis Normal Treatment: I recommended application of Cetaphil or CeraVe numerous times a day going to bed to all dry areas.
Nosebleeds Normal Treatment: I explained this is common when taking isotretinoin. I recommended saline mist in each nostril multiple times a day. If this worsens they will contact us.
Completed Therapy?: No
Use Therapeutic Ranged Or Therapeutic Target: please select Range or Target
Female Completion Statement: FEMALE PATIENT - FINAL VISIT \\n- Discussed that we will be discontinuing isotretinoin today.\\n- Reminded that it is still imperative to maintain 100% compliance with the same methods of birth control she has been using throughout this course of isotretinoin for at least 30 days beyond her final pill. \\n- Advise that a final urine pregnancy test will be necessary to complete the retirements of the [employer] program in 30-37 days from now. \\n- Advised to schedule a nurse visit for this final UPT.   \\n- Reviewed the possible expectations for her acne from here on out.\\n- Discussed maintenance options.\\n- Urine pregnant test performed today.
Patient Weight (Optional But Required For Cumulative Dose-Numbers And Decimals Only): 180
Myalgia Treatment: I explained this is common when taking isotretinoin. If this worsens they will contact us. They may try OTC ibuprofen.
Target Cumulative Dosage (In Mg/Kg): 135
Myalgia Monitoring: - At this point, patient reports that this is mild and tolerable. Explained this is common when taking isotretinoin. If this worsens, contact us.
Counseling Text: I reviewed the side effect in detail. Patient should get monthly blood tests, not donate blood, not drive at night if vision affected, and not share medication.
Retinoid Dermatitis Normal Treatment: I recommended more frequent application of Cetaphil or CeraVe to the areas of dermatitis.
Pounds Preamble Statement (Weight Entered In Details Tab): Reported Weight in pounds:
Upper Range (In Mg/Kg): 150
Hypertriglyceridemia Monitoring: I explained this is common when taking isotretinoin. We will monitor closely.
Retinoid Dermatitis Aggressive Treatment: I recommended more frequent application of Cetaphil or CeraVe to the areas of dermatitis. I also prescribed a topical steroid for twice daily use until the dermatitis resolves.
Lower Range (In Mg/Kg): 120
Headache Monitoring: I recommended monitoring the headaches for now. There is no evidence of increased intracranial pressure. They were instructed to call if the headaches are worsening.
What Is The Patient's Gender: Female
Hypertriglyceridemia Treatment: - I explained this is common when taking isotretinoin. \\n- Advised that taking over-the-counter fish oil can be helpful to reduce triglycerides.\\n- We will continue to monitor.
Xerosis Aggressive Treatment: I recommended application of Cetaphil or CeraVe numerous times a day going to bed to all dry areas. I also prescribed a topical steroid for twice daily use.
Xerosis Aggressive Treatment: - Recommended application of Cetaphil or CeraVe cream numerous times a day and before going to bed to all dry areas.\\n- Also, will prescribe a topical steroid for twice daily use as needed until pruritus is resolved for never longer than 14 days per month.
Detail Level: Zone
Cheilitis Aggressive Treatment: I recommended application of Vaseline or Aquaphor numerous times a day (as often as every hour) and before going to bed. I also prescribed a topical steroid for twice daily use.
Weight Units: pounds
Next Month's Dosage: 20mg BID
Myalgia Monitoring: I explained this is common when taking isotretinoin. If this worsens they will contact us.
Cheilitis Normal Treatment: - Recommended application of Vaseline ointment or Aquaphor numerous times a day (as often as every hour) and before going to bed.
Nosebleeds Normal Treatment: - I explained nosebleeds are common when taking isotretinoin. \\n- Recommended saline mist in each nostril multiple times a day. \\n- May also consider applying Aquaphor intranasally once every night.\\n- If this worsens, call the clinic. Patient expressed understanding.
Headache Monitoring: - Recommended monitoring the headaches for now. There is no evidence of increased intracranial pressure. They were instructed to call if the headaches are worsening.
Retinoid Dermatitis Aggressive Treatment: - Recommended application of Cetaphil or CeraVe cream numerous times a day and before going to bed to all dry and rashy areas.\\n- Also, will prescribe a topical steroid for twice daily use as needed until pruritus is resolved for never longer than 14 days per month.
Female Pregnancy Counseling Text: Female patients should also be on two forms of birth control while taking this medication and for one month after their last dose.
Cheilitis Aggressive Treatment: - Recommended application of Dr. Dan?s Cortibalm used several times per day (as often as every hour you are awake when very severe lip dryness is present).\\n- Once improved switch back to Vaseline or Aquaphor numerous times a day and before going to bed.
Cheilitis Normal Treatment: I recommended application of Vaseline or Aquaphor numerous times a day (as often as every hour) and before going to bed.

## 2023-05-11 NOTE — PROCEDURE: COUNSELING
Detail Level: Zone
Patient Specific Counseling (Will Not Stick From Patient To Patient): - Due to report of previous hive reaction ( patient feels may be due to consumption of zucchini). Avoid squash for time being until she sees an allergist and take a photo should this recur. Patient expressed understanding. \\n- Recommend seeing an allergist.

## 2023-05-13 DIAGNOSIS — F43.23 ADJUSTMENT DISORDER WITH MIXED ANXIETY AND DEPRESSED MOOD: ICD-10-CM

## 2023-05-13 DIAGNOSIS — E03.9 HYPOTHYROIDISM, UNSPECIFIED TYPE: ICD-10-CM

## 2023-05-16 RX ORDER — LEVOTHYROXINE SODIUM 125 UG/1
TABLET ORAL
Qty: 90 TABLET | Refills: 0 | Status: SHIPPED | OUTPATIENT
Start: 2023-05-16 | End: 2023-08-14

## 2023-05-16 NOTE — TELEPHONE ENCOUNTER
Prescription approved per [facility] Refill Protocol.  Li Clakr RN on 5/16/2023 at 2:02 PM

## 2023-05-16 NOTE — TELEPHONE ENCOUNTER
Pending Prescriptions:                       Disp   Refills    buPROPion (WELLBUTRIN XL) 300 MG 24 hr tab*90 tab*3        Sig: TAKE 1 TABLET BY MOUTH EVERY MORNING    Signed Prescriptions:                        Disp   Refills    levothyroxine (SYNTHROID/LEVOTHROID) 125 M*90 tab*0        Sig: TAKE 1 TABLET BY MOUTH EVERY DAY  Authorizing Provider: NICHOLAS CORRAL  Ordering User: OTILIA ALVARADO      Routing refill request to provider for review/approval because:  Phq9 is out of range    Otilia Alvarado RN on 5/16/2023 at 2:02 PM

## 2023-05-17 RX ORDER — BUPROPION HYDROCHLORIDE 300 MG/1
TABLET ORAL
Qty: 30 TABLET | Refills: 0 | Status: SHIPPED | OUTPATIENT
Start: 2023-05-17 | End: 2023-06-10

## 2023-05-25 ENCOUNTER — OFFICE VISIT (OUTPATIENT)
Dept: FAMILY MEDICINE | Facility: CLINIC | Age: 28
End: 2023-05-25
Payer: COMMERCIAL

## 2023-05-25 ENCOUNTER — MYC MEDICAL ADVICE (OUTPATIENT)
Dept: FAMILY MEDICINE | Facility: CLINIC | Age: 28
End: 2023-05-25

## 2023-05-25 VITALS
HEIGHT: 62 IN | BODY MASS INDEX: 34.96 KG/M2 | SYSTOLIC BLOOD PRESSURE: 120 MMHG | WEIGHT: 190 LBS | OXYGEN SATURATION: 100 % | HEART RATE: 91 BPM | TEMPERATURE: 97.2 F | DIASTOLIC BLOOD PRESSURE: 72 MMHG | RESPIRATION RATE: 18 BRPM

## 2023-05-25 DIAGNOSIS — E66.811 CLASS 1 OBESITY WITH SERIOUS COMORBIDITY AND BODY MASS INDEX (BMI) OF 34.0 TO 34.9 IN ADULT, UNSPECIFIED OBESITY TYPE: ICD-10-CM

## 2023-05-25 DIAGNOSIS — F41.8 DEPRESSION WITH ANXIETY: ICD-10-CM

## 2023-05-25 DIAGNOSIS — Z00.00 ROUTINE GENERAL MEDICAL EXAMINATION AT A HEALTH CARE FACILITY: Primary | ICD-10-CM

## 2023-05-25 DIAGNOSIS — G43.109 MIGRAINE WITH AURA AND WITHOUT STATUS MIGRAINOSUS, NOT INTRACTABLE: ICD-10-CM

## 2023-05-25 DIAGNOSIS — E03.9 HYPOTHYROIDISM, UNSPECIFIED TYPE: ICD-10-CM

## 2023-05-25 LAB
ANION GAP SERPL CALCULATED.3IONS-SCNC: 11 MMOL/L (ref 7–15)
BUN SERPL-MCNC: 11.3 MG/DL (ref 6–20)
CALCIUM SERPL-MCNC: 9.3 MG/DL (ref 8.6–10)
CHLORIDE SERPL-SCNC: 102 MMOL/L (ref 98–107)
CREAT SERPL-MCNC: 0.99 MG/DL (ref 0.51–0.95)
DEPRECATED HCO3 PLAS-SCNC: 25 MMOL/L (ref 22–29)
ERYTHROCYTE [DISTWIDTH] IN BLOOD BY AUTOMATED COUNT: 12.3 % (ref 10–15)
GFR SERPL CREATININE-BSD FRML MDRD: 78 ML/MIN/1.73M2
GLUCOSE SERPL-MCNC: 138 MG/DL (ref 70–99)
HCT VFR BLD AUTO: 40.2 % (ref 35–47)
HGB BLD-MCNC: 14.1 G/DL (ref 11.7–15.7)
MCH RBC QN AUTO: 29.6 PG (ref 26.5–33)
MCHC RBC AUTO-ENTMCNC: 35.1 G/DL (ref 31.5–36.5)
MCV RBC AUTO: 84 FL (ref 78–100)
PLATELET # BLD AUTO: 174 10E3/UL (ref 150–450)
POTASSIUM SERPL-SCNC: 4 MMOL/L (ref 3.4–5.3)
RBC # BLD AUTO: 4.77 10E6/UL (ref 3.8–5.2)
SODIUM SERPL-SCNC: 138 MMOL/L (ref 136–145)
TSH SERPL DL<=0.005 MIU/L-ACNC: 1.59 UIU/ML (ref 0.3–4.2)
WBC # BLD AUTO: 5.5 10E3/UL (ref 4–11)

## 2023-05-25 PROCEDURE — 99214 OFFICE O/P EST MOD 30 MIN: CPT | Mod: 25 | Performed by: FAMILY MEDICINE

## 2023-05-25 PROCEDURE — 84443 ASSAY THYROID STIM HORMONE: CPT | Performed by: FAMILY MEDICINE

## 2023-05-25 PROCEDURE — 80048 BASIC METABOLIC PNL TOTAL CA: CPT | Performed by: FAMILY MEDICINE

## 2023-05-25 PROCEDURE — 99395 PREV VISIT EST AGE 18-39: CPT | Performed by: FAMILY MEDICINE

## 2023-05-25 PROCEDURE — 36415 COLL VENOUS BLD VENIPUNCTURE: CPT | Performed by: FAMILY MEDICINE

## 2023-05-25 PROCEDURE — 85027 COMPLETE CBC AUTOMATED: CPT | Performed by: FAMILY MEDICINE

## 2023-05-25 RX ORDER — SUMATRIPTAN 25 MG/1
25 TABLET, FILM COATED ORAL
Qty: 18 TABLET | Refills: 1 | Status: SHIPPED | OUTPATIENT
Start: 2023-05-25 | End: 2024-04-05

## 2023-05-25 ASSESSMENT — PATIENT HEALTH QUESTIONNAIRE - PHQ9
SUM OF ALL RESPONSES TO PHQ QUESTIONS 1-9: 13
SUM OF ALL RESPONSES TO PHQ QUESTIONS 1-9: 13
10. IF YOU CHECKED OFF ANY PROBLEMS, HOW DIFFICULT HAVE THESE PROBLEMS MADE IT FOR YOU TO DO YOUR WORK, TAKE CARE OF THINGS AT HOME, OR GET ALONG WITH OTHER PEOPLE: SOMEWHAT DIFFICULT

## 2023-05-25 ASSESSMENT — PAIN SCALES - GENERAL: PAINLEVEL: NO PAIN (0)

## 2023-05-25 ASSESSMENT — ENCOUNTER SYMPTOMS
ARTHRALGIAS: 0
HEMATOCHEZIA: 0
PARESTHESIAS: 0
DYSURIA: 0
ADENOPATHY: 0
HEMATURIA: 0
SHORTNESS OF BREATH: 0
WEAKNESS: 0
FREQUENCY: 0
SORE THROAT: 0
PALPITATIONS: 0
NERVOUS/ANXIOUS: 1
EYE PAIN: 1
HEADACHES: 1
DIZZINESS: 1
HEARTBURN: 1
MYALGIAS: 0
ABDOMINAL PAIN: 0
JOINT SWELLING: 0
COUGH: 0
BRUISES/BLEEDS EASILY: 0
NAUSEA: 0
CONSTIPATION: 0
FEVER: 0
CHILLS: 0
DIARRHEA: 0

## 2023-05-25 NOTE — PROGRESS NOTES
SUBJECTIVE:   CC: Mora is an 28 year old who presents for preventive health visit.   Patient has been advised of split billing requirements and indicates understanding: Yes  Healthy Habits:     Getting at least 3 servings of Calcium per day:  Yes    Bi-annual eye exam:  Yes    Dental care twice a year:  Yes    Sleep apnea or symptoms of sleep apnea:  None    Diet:  Regular (no restrictions)    Frequency of exercise:  None    Taking medications regularly:  Yes    Medication side effects:  Not applicable    PHQ-2 Total Score: 2    Additional concerns today:  Yes      PROBLEMS TO ADD ON.  Bad headaches lately, vision gets blurry.   Medication interactions     Today's PHQ-2 Score:       5/25/2023     5:08 PM   PHQ-2 ( 1999 [employer])   Q1: Little interest or pleasure in doing things 2   Q2: Feeling down, depressed or hopeless 1   PHQ-2 Score 3   Q1: Little interest or pleasure in doing things More than half the days   Q2: Feeling down, depressed or hopeless Several days   PHQ-2 Score 3           Social History     Tobacco Use     Smoking status: Never     Passive exposure: Never     Smokeless tobacco: Never   Vaping Use     Vaping status: Never Used   Substance Use Topics     Alcohol use: Yes     Comment: once a month             5/25/2023     5:07 PM   Alcohol Use   Prescreen: >3 drinks/day or >7 drinks/week? Not Applicable     Reviewed orders with patient.  Reviewed health maintenance and updated orders accordingly - Yes  Lab work is in process  Labs reviewed in EPIC  BP Readings from Last 3 Encounters:   05/25/23 120/72   11/04/22 118/70   06/02/22 120/74    Wt Readings from Last 3 Encounters:   05/25/23 86.2 kg (190 lb)   11/04/22 77.6 kg (171 lb)   06/02/22 77.6 kg (171 lb)                  Patient Active Problem List   Diagnosis     Papanicolaou smear of cervix with low grade squamous intraepithelial lesion (LGSIL)     Hypothyroidism, unspecified type     Adjustment disorder with mixed anxiety and depressed  mood     IUD (intrauterine device) in place - due for removal 11/2026     Gluten-sensitive enteropathy     Past Surgical History:   Procedure Laterality Date     COLONOSCOPY N/A 6/3/2021    Procedure: COLONOSCOPY, WITH BIOPSY;  Surgeon: Lindy Garcia MD;  [place] [facility] [place]     ESOPHAGOSCOPY, GASTROSCOPY, DUODENOSCOPY (EGD), COMBINED N/A 6/3/2021    Procedure: ESOPHAGOGASTRODUODENOSCOPY, WITH BIOPSY;  Surgeon: Lindy Garcia MD;  Location: [facility] OR     LAPAROSCOPIC CHOLECYSTECTOMY N/A 5/15/2020    Procedure: laparoscopic cholecystectomy;  Surgeon: Peter Hidalgo MD;  Location: [facility] OR       Social History     Tobacco Use     Smoking status: Never     Passive exposure: Never     Smokeless tobacco: Never   Vaping Use     Vaping status: Never Used   Substance Use Topics     Alcohol use: Yes     Comment: once a month     Family History   Problem Relation Age of Onset     Rheumatoid Arthritis Mother      Thyroid Disease Maternal Grandmother      Hypertension Sister      Crohn's Disease No family hx of      Ulcerative Colitis No family hx of          Current Outpatient Medications   Medication Sig Dispense Refill     buPROPion (WELLBUTRIN XL) 300 MG 24 hr tablet TAKE 1 TABLET BY MOUTH EVERY MORNING 30 tablet 0     CLARAVIS 20 MG capsule Take 40 mg by mouth daily       levonorgestrel (MIRENA) 20 MCG/24HR IUD 1 each (20 mcg) by Intrauterine route once       levothyroxine (SYNTHROID/LEVOTHROID) 125 MCG tablet TAKE 1 TABLET BY MOUTH EVERY DAY 90 tablet 0     lubiprostone (AMITIZA) 24 MCG capsule Take 1 capsule (24 mcg) by mouth 2 times daily (with meals) 60 capsule 3     topiramate (TOPAMAX) 50 MG tablet TAKE 1 TABLET BY MOUTH EVERY DAY 90 tablet 1     VENTOLIN  (90 Base) MCG/ACT inhaler Inhale 2 puffs into the lungs 4 times daily as needed 18 g 3     Allergies   Allergen Reactions     Amoxicillin Hives     Penicillins Hives     Recent Labs   Lab Test 06/02/22  1738 02/17/22  1512  21  1402 21  1614 20  1045 20  2017 20  2118 20  1722 20  1822 20  1439 19  1650   A1C  --   --   --   --   --   --   --   --   --   --  4.9   LDL  --   --   --   --  159*  --   --   --   --   --  62   HDL  --   --   --   --  42*  --   --   --   --   --  32*   TRIG  --   --   --   --  101  --   --   --   --   --  195*   ALT  --   --   --   --   --   --  31 21 20   < >  --    CR  --   --   --  0.90  --   --  0.69 0.77 0.74   < >  --    GFRESTIMATED  --   --   --  87  --   --  >90 >90 >90   < >  --    GFRESTBLACK  --   --   --  >90  --   --  >90 >90 >90   < >  --    POTASSIUM  --   --   --  4.3  --   --  3.9 4.1 4.1   < >  --    TSH 1.15 1.69   < > 0.12*  --    < >  --  6.56*  --    < > 0.12*    < > = values in this interval not displayed.        Breast Cancer Screenin/2/2022     5:05 PM   Breast CA Risk Assessment (FHS-7)   Do you have a family history of breast, colon, or ovarian cancer? No / Unknown       click delete button to remove this line now    Pertinent mammograms are reviewed under the imaging tab.    History of Pap smear:   Last 3 Pap Results:   PAP (no units)   Date Value   2020 NIL   2019 LSIL (A)         Latest Ref Rng & Units 2020    10:24 AM 2020     9:40 AM 9/3/2019     5:30 PM   PAP / HPV   PAP (Historical)  NIL    LSIL     HPV 16 DNA NEG^Negative  Negative      HPV 18 DNA NEG^Negative  Negative      Other HR HPV NEG^Negative  Negative        Reviewed and updated as needed this visit by clinical staff   Tobacco  Allergies  Meds              Reviewed and updated as needed this visit by Provider                     Review of Systems   Constitutional: Negative for chills and fever.   HENT: Negative for congestion, ear pain, hearing loss and sore throat.    Eyes: Positive for pain and visual disturbance.   Respiratory: Negative for cough and shortness of breath.    Cardiovascular: Negative for chest pain,  "palpitations and peripheral edema.   Gastrointestinal: Positive for heartburn. Negative for abdominal pain, constipation, diarrhea, hematochezia and nausea.   Endocrine: Negative for cold intolerance and heat intolerance.   Genitourinary: Negative for dysuria, frequency, genital sores, hematuria and urgency.   Musculoskeletal: Negative for arthralgias, joint swelling and myalgias.   Skin: Negative for rash.   Allergic/Immunologic: Negative for environmental allergies and food allergies.   Neurological: Positive for dizziness and headaches. Negative for weakness and paresthesias.   Hematological: Negative for adenopathy. Does not bruise/bleed easily.   Psychiatric/Behavioral: Positive for mood changes. The patient is nervous/anxious.         OBJECTIVE:   /72 (Cuff Size: Adult Regular)   Pulse 91   Temp 97.2  F (36.2  C) (Tympanic)   Resp 18   Ht 1.575 m (5' 2\")   Wt 86.2 kg (190 lb)   LMP  (LMP Unknown)   SpO2 100%   BMI 34.75 kg/m    Physical Exam  GENERAL: alert, no distress and obese  EYES: Eyes grossly normal to inspection, PERRL and conjunctivae and sclerae normal  HENT: normal cephalic/atraumatic, nose and mouth without ulcers or lesions, oropharynx clear and oral mucous membranes moist  NECK: no adenopathy, no asymmetry, masses, or scars and thyroid normal to palpation  RESP: lungs clear to auscultation - no rales, rhonchi or wheezes  CV: regular rate and rhythm, normal S1 S2, no S3 or S4, no murmur, click or rub, no peripheral edema and peripheral pulses strong  ABDOMEN: soft, nontender, no hepatosplenomegaly, no masses and bowel sounds normal  MS: no gross musculoskeletal defects noted, no edema  SKIN: no suspicious lesions or rashes  NEURO: Normal strength and tone, mentation intact and speech normal  PSYCH: mentation appears normal and anxious      ASSESSMENT/PLAN:   (Z00.00) Routine general medical examination at a health care facility  (primary encounter diagnosis)  Comment: 36-year-old " "female presented for routine physical exam.  Complains of ongoing intermittent headaches.  As per patient, known to have migraine.  Differential discussed in detail including migraine versus headache related to lubiprostone which patient has been taking for IBS.  Management options discussed.  Imitrex prescribed and suggested to discuss with GI as well.  Neurology referral placed.  Patient also requested weight management options, not taking topiramate due to side effects of constipation.  Saxenda prescribed, common side effects discussed.  Recommended well hydration, increasing fiber in diet, regular exercise and nutritionist referral placed.      (E03.9) Hypothyroidism, unspecified type  Comment: TSH ordered, will titrate levothyroxine dose accordingly  Plan: TSH WITH FREE T4 REFLEX, CBC with platelets,         Basic metabolic panel  (Ca, Cl, CO2, Creat,         Gluc, K, Na, BUN)            (E66.9,  Z68.34) Class 1 obesity with serious comorbidity and body mass index (BMI) of 34.0 to 34.9 in adult, unspecified obesity type  Comment: As above  Plan: Nutrition Referral, liraglutide - Weight         Management (SAXENDA) 18 MG/3ML pen            (F41.8) Depression with anxiety  Comment: Continue Wellbutrin      (G43.109) Migraine with aura and without status migrainosus, not intractable  Comment: As above  Plan: SUMAtriptan (IMITREX) 25 MG tablet, Adult         Neurology  Referral          COUNSELING:  Reviewed preventive health counseling, as reflected in patient instructions      BMI:   Estimated body mass index is 34.75 kg/m  as calculated from the following:    Height as of this encounter: 1.575 m (5' 2\").    Weight as of this encounter: 86.2 kg (190 lb).   Weight management plan: Discussed healthy diet and exercise guidelines Saxenda prescribed, and nutritionist referral placed      She reports that she has never smoked. She has never been exposed to tobacco smoke. She has never used smokeless " tobacco.      Abdoul Tinoco MD  [facility]

## 2023-06-01 ENCOUNTER — MYC MEDICAL ADVICE (OUTPATIENT)
Dept: FAMILY MEDICINE | Facility: CLINIC | Age: 28
End: 2023-06-01
Payer: COMMERCIAL

## 2023-06-01 ENCOUNTER — TELEPHONE (OUTPATIENT)
Dept: FAMILY MEDICINE | Facility: CLINIC | Age: 28
End: 2023-06-01

## 2023-06-01 NOTE — TELEPHONE ENCOUNTER
Prior Authorization Retail Medication Request    Medication/Dose: saxenda  ICD code (if different than what is on RX):     Previously Tried and Failed:     Rationale:       Insurance Name:  [phone]  Insurance ID:  [id number]      Pharmacy Information (if different than what is on RX)  Name:     Phone:        <<----- Click to add NO significant Past Surgical History

## 2023-06-03 NOTE — TELEPHONE ENCOUNTER
PA Initiation    Medication: SAXENDA 18 MG/3ML SC SOPN  Insurance Company: [employer] - Phone [phone] Fax [phone]  Pharmacy Filling the Rx: [id number] - [place], [place] - [address] [place] [address]  Filling Pharmacy Phone: [phone]  Filling Pharmacy Fax: [phone]  Start Date: 6/3/2023          Thank you,     Omer White [facility]  Pharmacy Clinic [facility]  [email or web address]   Phone: [phone]  Fax: [phone]

## 2023-06-04 NOTE — TELEPHONE ENCOUNTER
Prior Authorization Approval    Medication: SAXENDA 18 MG/3ML SC SOPN  Authorization Effective Date: 5/4/2023  Authorization Expiration Date: 6/2/2024  Approved Dose/Quantity: 15 ml per 30 days   Reference #: [id number]   Insurance Company: [employer] - Phone [phone] Fax [phone]  Expected CoPay: $35     CoPay Card Available:      Financial Assistance Needed: No  Which Pharmacy is filling the prescription: [facility] [id number] [place] [address]  Pharmacy Notified: Yes  Patient Notified: Yes **Instructed pharmacy to notify patient when script is ready to /ship.**          Thank you,     Omer White [facility]  Pharmacy Clinic [facility]   [email or web address]   Phone: [phone]  Fax: [phone]

## 2023-06-06 ENCOUNTER — TELEPHONE (OUTPATIENT)
Dept: FAMILY MEDICINE | Facility: CLINIC | Age: 28
End: 2023-06-06
Payer: COMMERCIAL

## 2023-06-06 DIAGNOSIS — E66.811 CLASS 1 OBESITY WITH SERIOUS COMORBIDITY AND BODY MASS INDEX (BMI) OF 34.0 TO 34.9 IN ADULT, UNSPECIFIED OBESITY TYPE: Primary | ICD-10-CM

## 2023-06-06 NOTE — TELEPHONE ENCOUNTER
Fax received from pharmacy.     Needing RX for pen needles to use with Saxenda.         Preferred Pharmacy:  [id number] [place] [facility] [place] [id number]  Phone: [phone] Fax: [phone]

## 2023-06-09 DIAGNOSIS — F43.23 ADJUSTMENT DISORDER WITH MIXED ANXIETY AND DEPRESSED MOOD: ICD-10-CM

## 2023-06-09 NOTE — TELEPHONE ENCOUNTER
Routing refill request to provider for review/approval because:    Requested Prescriptions   Pending Prescriptions Disp Refills    buPROPion (WELLBUTRIN XL) 300 MG 24 hr tablet [Pharmacy Med Name: BUPROPION HCL  MG TABLET] 30 tablet 0     Sig: TAKE 1 TABLET BY MOUTH EVERY DAY IN THE MORNING       SSRIs Protocol Failed - 6/9/2023 12:32 PM        Failed - PHQ-9 score less than 5 in past 6 months     Please review last PHQ-9 score.

## 2023-06-10 RX ORDER — BUPROPION HYDROCHLORIDE 300 MG/1
TABLET ORAL
Qty: 30 TABLET | Refills: 0 | Status: SHIPPED | OUTPATIENT
Start: 2023-06-10 | End: 2023-11-16

## 2023-06-15 ENCOUNTER — APPOINTMENT (OUTPATIENT)
Dept: URBAN - METROPOLITAN AREA CLINIC 252 | Age: 28
Setting detail: DERMATOLOGY
End: 2023-06-16

## 2023-06-15 VITALS — HEIGHT: 62 IN | WEIGHT: 180 LBS

## 2023-06-15 DIAGNOSIS — L663 OTHER SPECIFIED DISEASES OF HAIR AND HAIR FOLLICLES: ICD-10-CM

## 2023-06-15 DIAGNOSIS — L738 OTHER SPECIFIED DISEASES OF HAIR AND HAIR FOLLICLES: ICD-10-CM

## 2023-06-15 DIAGNOSIS — L70.0 ACNE VULGARIS: ICD-10-CM

## 2023-06-15 PROBLEM — L02.425 FURUNCLE OF RIGHT LOWER LIMB: Status: ACTIVE | Noted: 2023-06-15

## 2023-06-15 PROBLEM — L02.426 FURUNCLE OF LEFT LOWER LIMB: Status: ACTIVE | Noted: 2023-06-15

## 2023-06-15 PROCEDURE — OTHER URINE PREGNANCY TEST: OTHER

## 2023-06-15 PROCEDURE — OTHER PRESCRIPTION: OTHER

## 2023-06-15 PROCEDURE — 99214 OFFICE O/P EST MOD 30 MIN: CPT

## 2023-06-15 PROCEDURE — OTHER ISOTRETINOIN MONITORING: OTHER

## 2023-06-15 PROCEDURE — OTHER COUNSELING: OTHER

## 2023-06-15 PROCEDURE — 81025 URINE PREGNANCY TEST: CPT

## 2023-06-15 RX ORDER — TRETIONIN 0.25 MG/G
0.025% CREAM TOPICAL QHS
Qty: 45 | Refills: 12 | Status: ERX | COMMUNITY
Start: 2023-06-15

## 2023-06-15 ASSESSMENT — LOCATION SIMPLE DESCRIPTION DERM
LOCATION SIMPLE: RIGHT THIGH
LOCATION SIMPLE: LEFT POSTERIOR THIGH
LOCATION SIMPLE: LEFT THIGH
LOCATION SIMPLE: LEFT CHEEK
LOCATION SIMPLE: RIGHT POSTERIOR THIGH

## 2023-06-15 ASSESSMENT — LOCATION ZONE DERM
LOCATION ZONE: FACE
LOCATION ZONE: LEG

## 2023-06-15 ASSESSMENT — LOCATION DETAILED DESCRIPTION DERM
LOCATION DETAILED: LEFT CENTRAL MALAR CHEEK
LOCATION DETAILED: LEFT ANTERIOR PROXIMAL THIGH
LOCATION DETAILED: LEFT PROXIMAL POSTERIOR THIGH
LOCATION DETAILED: RIGHT PROXIMAL POSTERIOR THIGH
LOCATION DETAILED: RIGHT ANTERIOR PROXIMAL THIGH
LOCATION DETAILED: LEFT INFERIOR CENTRAL MALAR CHEEK

## 2023-06-15 NOTE — PROCEDURE: ISOTRETINOIN MONITORING
Calculate Months Of Therapy Based On Documented Dosages (Will Hide Months Of Therapy Question)?: No
Female Completion Statement: FEMALE PATIENT - FINAL VISIT \\n- Discussed that we will be discontinuing isotretinoin today.\\n- Reminded that it is still imperative to maintain 100% compliance with the same methods of birth control she has been using throughout this course of isotretinoin for at least 30 days beyond her final pill due to teratogenicity.\\n- Must also continue to refrain from donating blood or plasma until 30 days after final isotretinoin pill.\\n- Advise that a final urine pregnancy test will be necessary to complete the retirements of the iPledge program in 31-37 days from now. Advised to schedule a nurse visit for this final UPT.   \\n- Reviewed the possible expectations for her acne from here on out.\\n- Discussed maintenance options.\\n- Urine pregnant test performed today.
Myalgia Treatment: I explained this is common when taking isotretinoin. If this worsens they will contact us. They may try OTC ibuprofen.
Hypercholesterolemia Monitoring: I explained this is common when taking isotretinoin. We will monitor closely.
Nosebleeds Normal Treatment: I explained this is common when taking isotretinoin. I recommended saline mist in each nostril multiple times a day. If this worsens they will contact us.
Target Cumulative Dosage (In Mg/Kg): 135
Hypertriglyceridemia Monitoring: I explained this is common when taking isotretinoin. If this worsens they will contact us.
Xerosis Normal Treatment: I recommended application of Cetaphil or CeraVe numerous times a day going to bed to all dry areas.
Retinoid Dermatitis Normal Treatment: I recommended more frequent application of Cetaphil or CeraVe to the areas of dermatitis.
Xerosis Aggressive Treatment: I recommended application of Cetaphil or CeraVe numerous times a day and before going to bed to all dry areas. I also prescribed a topical steroid for twice daily use.
Add High Risk Medication Management Associated Diagnosis?: Yes
Female Pregnancy Counseling Text: Female patients should also be on two forms of birth control while taking this medication and for one month after their last dose.
Headache Monitoring: I recommended monitoring the headaches for now. There is no evidence of increased intracranial pressure. They were instructed to call if the headaches are worsening.
Upper Range (In Mg/Kg): 150
Male Completion Statement: - After discussing his treatment course we decided to discontinue isotretinoin therapy at this time.\\n- He shouldn't donate blood for one month after the last dose. He should call with any new symptoms of depression.\\n- Discussed maintenance options.
Cheilitis Normal Treatment: - Recommended application of Vaseline ointment or Aquaphor numerous times a day (as often as every hour) and before going to bed.
Cheilitis Normal Treatment: I recommended application of Vaseline or Aquaphor numerous times a day (as often as every hour) and before going to bed.
Cheilitis Aggressive Treatment: I recommended application of Vaseline or Aquaphor numerous times a day (as often as every hour) and before going to bed. I also prescribed a topical steroid for twice daily use.
Pounds Preamble Statement (Weight Entered In Details Tab): Reported Weight in pounds:
Detail Level: Zone
Counseling Text: I reviewed the side effect in detail. Patient should get monthly blood tests, not donate blood, not drive at night if vision affected, and not share medication.
Retinoid Dermatitis Aggressive Treatment: I recommended more frequent application of Cetaphil or CeraVe to the areas of dermatitis. I also prescribed a topical steroid for twice daily use until the dermatitis resolves.
Weight Units: pounds
Xerosis Normal Treatment: - Recommended application of Cetaphil or CeraVe cream numerous times a day and before going to bed to all dry areas.
What Is The Patient's Gender: Female
Next Month's Dosage: Continue Current Dosage
Lower Range (In Mg/Kg): 120
Is Xerosis Present?: Yes - Normal Treatment
Xerosis Aggressive Treatment: I recommended application of Cetaphil or CeraVe numerous times a day going to bed to all dry areas. I also prescribed a topical steroid for twice daily use.
Months Of Therapy Completed: 9
Use Therapeutic Ranged Or Therapeutic Target: please select Range or Target
Kilograms Preamble Statement (Weight Entered In Details Tab): Reported Weight in kilograms:

## 2023-06-15 NOTE — HPI: MEDICATION (ISOTRETINOIN)
Is This A New Presentation, Or A Follow-Up?: Follow Up Isotretinoin
Additional History: Dose decreased at last office visit to 20mg qd. Patient reports 100% compliance with both forms of birth control.  Patient is experiencing the side effect of dryness, but reports that the dryness is tolerable and mangeable. Patient is experiencing the side effect of cheilitis but this manageable. Patient denies any mood changes including denying depression/sadness and denies diarrhea or abdominal discomfort. Gets folliculitis on legs still. Acne has been clear this past month, No new pimples this past month.

## 2023-06-15 NOTE — PROCEDURE: COUNSELING
Detail Level: Simple
Patient Specific Counseling (Will Not Stick From Patient To Patient): Patient to use Clindamycin gel, provided at another visit. Patient will call if refills are needed.

## 2023-06-28 ENCOUNTER — MYC REFILL (OUTPATIENT)
Dept: GASTROENTEROLOGY | Facility: CLINIC | Age: 28
End: 2023-06-28
Payer: COMMERCIAL

## 2023-06-28 RX ORDER — LUBIPROSTONE 24 UG/1
24 CAPSULE ORAL 2 TIMES DAILY WITH MEALS
Qty: 180 CAPSULE | Refills: 1 | Status: SHIPPED | OUTPATIENT
Start: 2023-06-28 | End: 2023-06-28

## 2023-06-28 RX ORDER — LUBIPROSTONE 24 UG/1
24 CAPSULE ORAL 2 TIMES DAILY WITH MEALS
Qty: 180 CAPSULE | Refills: 0 | Status: SHIPPED | OUTPATIENT
Start: 2023-06-28 | End: 2023-10-06

## 2023-07-11 ENCOUNTER — OFFICE VISIT (OUTPATIENT)
Dept: URGENT CARE | Facility: URGENT CARE | Age: 28
End: 2023-07-11
Payer: COMMERCIAL

## 2023-07-11 VITALS
RESPIRATION RATE: 33 BRPM | OXYGEN SATURATION: 98 % | SYSTOLIC BLOOD PRESSURE: 123 MMHG | DIASTOLIC BLOOD PRESSURE: 82 MMHG | BODY MASS INDEX: 33.65 KG/M2 | HEART RATE: 108 BPM | WEIGHT: 184 LBS | TEMPERATURE: 99.2 F

## 2023-07-11 DIAGNOSIS — R07.0 THROAT PAIN: Primary | ICD-10-CM

## 2023-07-11 PROCEDURE — 99213 OFFICE O/P EST LOW 20 MIN: CPT | Performed by: INTERNAL MEDICINE

## 2023-07-11 RX ORDER — CEFDINIR 300 MG/1
300 CAPSULE ORAL 2 TIMES DAILY
Qty: 20 CAPSULE | Refills: 0 | Status: SHIPPED | OUTPATIENT
Start: 2023-07-11 | End: 2023-07-21

## 2023-07-11 NOTE — PROGRESS NOTES
SUBJECTIVE:  Mora Maldonado is an 28 year old female who presents for four days of sore throat.  throat feels dry and drinking lots of water but not help.  thraot has been scratchy and sore a little the past couple days.  Today throat feels like it's on fire.  No runny nose.  occ cough. No fevers.  Vomited once this morning.  No skin rashes.  No recent travel.  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  Took some tylenol pm last night which didn't help.      PMH:   has a past medical history of Abnormal Pap smear of cervix (09/03/2019), Anxiety, Depressive disorder, Hypothyroidism, and Thyroid disease.  Patient Active Problem List   Diagnosis     Papanicolaou smear of cervix with low grade squamous intraepithelial lesion (LGSIL)     Hypothyroidism, unspecified type     Adjustment disorder with mixed anxiety and depressed mood     IUD (intrauterine device) in place - due for removal 11/2026     Gluten-sensitive enteropathy     Social History     Socioeconomic History     Marital status: Single     Spouse name: None     Number of children: None     Years of education: None     Highest education level: None   Tobacco Use     Smoking status: Never     Passive exposure: Never     Smokeless tobacco: Never   Vaping Use     Vaping Use: Never used   Substance and Sexual Activity     Alcohol use: Yes     Comment: once a month     Drug use: Never     Sexual activity: Yes     Partners: Male     Birth control/protection: Implant     Family History   Problem Relation Age of Onset     Rheumatoid Arthritis Mother      Thyroid Disease Maternal Grandmother      Hypertension Sister      Crohn's Disease No family hx of      Ulcerative Colitis No family hx of        ALLERGIES:  Amoxicillin and Penicillins    Current Outpatient Medications   Medication     buPROPion (WELLBUTRIN XL) 300 MG 24 hr tablet     insulin pen needle (32G X 4 MM) 32G X 4 MM miscellaneous     levonorgestrel (MIRENA) 20 MCG/24HR IUD     levothyroxine  (SYNTHROID/LEVOTHROID) 125 MCG tablet     liraglutide - Weight Management (SAXENDA) 18 MG/3ML pen     lubiprostone (AMITIZA) 24 MCG capsule     VENTOLIN  (90 Base) MCG/ACT inhaler     CLARAVIS 20 MG capsule     SUMAtriptan (IMITREX) 25 MG tablet     No current facility-administered medications for this visit.         ROS:  ROS is done and is negative for general/constitutional, eye, ENT, Respiratory, cardiovascular, GI, , Skin, musculoskeletal except as noted elsewhere.  All other review of systems negative except as noted elsewhere.      OBJECTIVE:  /82 (BP Location: Right arm, Patient Position: Sitting, Cuff Size: Adult Large)   Pulse 108   Temp 99.2  F (37.3  C) (Tympanic)   Resp (!) 33   Wt 83.5 kg (184 lb)   LMP  (LMP Unknown)   SpO2 98%   BMI 33.65 kg/m    GENERAL APPEARANCE: Alert, in no acute distress  EYES: normal  EARS: External ears normal. Canals clear. TM's normal.  NOSE:normal  OROPHARYNX:moderate erythema  NECK:few small anterior cervical nodes  RESP: normal and clear to auscultation  CV:regular rate and rhythm and no murmurs, clicks, or gallops  ABDOMEN: Abdomen soft, non-tender. BS normal. No masses, organomegaly  SKIN: no ulcers, lesions or rash  MUSCULOSKELETAL:Musculoskeletal normal      RESULTS  No results found for any visits on 07/11/23..  No results found for this or any previous visit (from the past 48 hour(s)).    ASSESSMENT/PLAN:    ASSESSMENT / PLAN:  (R07.0) Throat pain  (primary encounter diagnosis)  Comment: unable to do strep test due to blood on swabs.  sxs and exam are c/w strep or other bacterial infection, so will start abx  Plan: cefdinir (OMNICEF) 300 MG capsule, CANCELED:         Streptococcus A Rapid Screen w/Reflex to PCR        Reviewed medication instructions and side effects. Follow up if experiences side effects..I reviewed supportive care, otc meds to use if needed, expected course, and signs of concern.  Follow up as needed or if does not improve  within 3 day(s) or if worsens in any way.  Reviewed red flag symptoms and is to go to the ER if experiences any of these.      See [facility] for orders, medications, letters, patient instructions    Sari Chowdhury M.D.

## 2023-07-14 ENCOUNTER — OFFICE VISIT (OUTPATIENT)
Dept: URGENT CARE | Facility: URGENT CARE | Age: 28
End: 2023-07-14
Payer: COMMERCIAL

## 2023-07-14 VITALS
BODY MASS INDEX: 33.22 KG/M2 | WEIGHT: 181.6 LBS | TEMPERATURE: 97.9 F | HEART RATE: 102 BPM | RESPIRATION RATE: 16 BRPM | OXYGEN SATURATION: 100 % | SYSTOLIC BLOOD PRESSURE: 126 MMHG | DIASTOLIC BLOOD PRESSURE: 82 MMHG

## 2023-07-14 DIAGNOSIS — J02.9 ACUTE PHARYNGITIS, UNSPECIFIED ETIOLOGY: Primary | ICD-10-CM

## 2023-07-14 LAB
DEPRECATED S PYO AG THROAT QL EIA: NEGATIVE
ERYTHROCYTE [DISTWIDTH] IN BLOOD BY AUTOMATED COUNT: 12 % (ref 10–15)
GROUP A STREP BY PCR: NOT DETECTED
HCT VFR BLD AUTO: 45.8 % (ref 35–47)
HGB BLD-MCNC: 15.6 G/DL (ref 11.7–15.7)
MCH RBC QN AUTO: 29.1 PG (ref 26.5–33)
MCHC RBC AUTO-ENTMCNC: 34.1 G/DL (ref 31.5–36.5)
MCV RBC AUTO: 85 FL (ref 78–100)
MONOCYTES NFR BLD AUTO: NEGATIVE %
PLATELET # BLD AUTO: 152 10E3/UL (ref 150–450)
RBC # BLD AUTO: 5.37 10E6/UL (ref 3.8–5.2)
WBC # BLD AUTO: 3.3 10E3/UL (ref 4–11)

## 2023-07-14 PROCEDURE — 87651 STREP A DNA AMP PROBE: CPT | Performed by: PHYSICIAN ASSISTANT

## 2023-07-14 PROCEDURE — 99213 OFFICE O/P EST LOW 20 MIN: CPT | Performed by: PHYSICIAN ASSISTANT

## 2023-07-14 PROCEDURE — 36415 COLL VENOUS BLD VENIPUNCTURE: CPT | Performed by: PHYSICIAN ASSISTANT

## 2023-07-14 PROCEDURE — 86308 HETEROPHILE ANTIBODY SCREEN: CPT | Performed by: PHYSICIAN ASSISTANT

## 2023-07-14 PROCEDURE — 85027 COMPLETE CBC AUTOMATED: CPT | Performed by: PHYSICIAN ASSISTANT

## 2023-07-14 RX ORDER — DEXAMETHASONE SODIUM PHOSPHATE 4 MG/ML
10 VIAL (ML) INJECTION ONCE
Status: COMPLETED | OUTPATIENT
Start: 2023-07-14 | End: 2023-07-14

## 2023-07-14 RX ORDER — PREDNISONE 20 MG/1
20 TABLET ORAL DAILY
Qty: 3 TABLET | Refills: 0 | Status: SHIPPED | OUTPATIENT
Start: 2023-07-16 | End: 2023-07-19

## 2023-07-14 RX ADMIN — Medication 10 MG: at 12:37

## 2023-07-14 NOTE — PATIENT INSTRUCTIONS
"Mono and strep negative.  White blood count is low, indicating a viral infection  Drink plenty of fluids and rest.  May use salt water gargles- about 8 oz warm water with about 1 teaspoon salt  Take Tylenol or an NSAID such as ibuprofen or naproxen as needed for pain.  May take up to 1000 mg of Tylenol every 6-8 hours- do not exceed 4000 mg in 24 hours.  May take up to 600 mg ibuprofen every 6-8 hours.  Alternate Tylenol and Ibuprofen every 4 hours.  Honey lemon tea helps to soothe the throat. \"Throat Coat\" tea is soothing as well.  Please follow up with primary care provider if not improving, worsening or new symptoms.  "

## 2023-07-14 NOTE — PROGRESS NOTES
Assessment & Plan     Acute pharyngitis, unspecified etiology  - Streptococcus A Rapid Screen w/Reflex to PCR - Clinic Collect  - Mononucleosis screen; Future  - CBC with platelets; Future  - Mononucleosis screen  - CBC with platelets  - Group A Streptococcus PCR Throat Swab  - dexamethasone (DECADRON) injectable solution used ORALLY 10 mg  - predniSONE (DELTASONE) 20 MG tablet; Take 1 tablet (20 mg) by mouth daily for 3 days    Strep and mono both negative.  CBC shows decreased white count at 3.3, suspect viral infection.  Dexamethasone given here in clinic today, if symptoms are not improved prednisone on Sunday Monday and Tuesday. If symptoms worsening or not improving after 1 more week,be seen for further evaluation.    Return in about 1 week (around 7/21/2023) for visit with primary care provider if not improving.     Amanda Linton PA-C  [facility]    Subjective   Mora Maldonado is a 28 year old who presents for the following health issues     Patient presents with:  Pharyngitis: Was seen a couple days ago for sore throat- strep test had blood on it so lab couldn't run it. Pt was put on antibiotics for possible strep. Feels like she is getting worse.     HPI    Mora presents clinic today for evaluation of sore throat.  Symptoms first began 7 days ago with a dry throat and excessive thirst.  3 days later, throat pain developed.  She was tested for strep but states that there was blood on the Q-tip so the strep test could not be done.  Based on her symptoms, she was started on cefdinir.  She is been taking Tylenol once in the evening.  No fever.  Symptoms do not seem to be improving at all.    Review of Systems   ROS negative except as stated above.      Objective    /82   Pulse 102   Temp 97.9  F (36.6  C) (Tympanic)   Resp 16   Wt 82.4 kg (181 lb 9.6 oz)   LMP  (LMP Unknown)   SpO2 100%   BMI 33.22 kg/m    Physical Exam   GENERAL: healthy, alert and no  distress  EYES: Eyes grossly normal to inspection, PERRL and conjunctivae and sclerae normal  HENT: ear canals and TM's normal, nose and mouth without ulcers or lesions, posterior pharynx erythematous without tonsillar hypertrophy or exudate  NECK: no adenopathy, no asymmetry, masses, or scars and thyroid normal to palpation  RESP: lungs clear to auscultation - no rales, rhonchi or wheezes  CV: regular rate and rhythm, normal S1 S2, no S3 or S4, no murmur, click or rub, no peripheral edema and peripheral pulses strong  SKIN: no suspicious lesions or rashes    Results for orders placed or performed in visit on 07/14/23   Mononucleosis screen     Status: Normal   Result Value Ref Range    Mononucleosis Screen Negative Negative   CBC with platelets     Status: Abnormal   Result Value Ref Range    WBC Count 3.3 (L) 4.0 - 11.0 10e3/uL    RBC Count 5.37 (H) 3.80 - 5.20 10e6/uL    Hemoglobin 15.6 11.7 - 15.7 g/dL    Hematocrit 45.8 35.0 - 47.0 %    MCV 85 78 - 100 fL    MCH 29.1 26.5 - 33.0 pg    MCHC 34.1 31.5 - 36.5 g/dL    RDW 12.0 10.0 - 15.0 %    Platelet Count 152 150 - 450 10e3/uL   Streptococcus A Rapid Screen w/Reflex to PCR - Clinic Collect     Status: Normal    Specimen: Throat; Swab   Result Value Ref Range    Group A Strep antigen Negative Negative

## 2023-08-13 DIAGNOSIS — E03.9 HYPOTHYROIDISM, UNSPECIFIED TYPE: ICD-10-CM

## 2023-08-14 RX ORDER — LEVOTHYROXINE SODIUM 125 UG/1
TABLET ORAL
Qty: 90 TABLET | Refills: 0 | Status: SHIPPED | OUTPATIENT
Start: 2023-08-14 | End: 2024-01-08

## 2023-08-16 ENCOUNTER — TELEPHONE (OUTPATIENT)
Dept: FAMILY MEDICINE | Facility: CLINIC | Age: 28
End: 2023-08-16

## 2023-08-16 DIAGNOSIS — E66.811 CLASS 1 OBESITY WITH SERIOUS COMORBIDITY AND BODY MASS INDEX (BMI) OF 34.0 TO 34.9 IN ADULT, UNSPECIFIED OBESITY TYPE: ICD-10-CM

## 2023-08-16 NOTE — TELEPHONE ENCOUNTER
Message left on pt identified VM asking her to call clinic to verify that she is taking 3mg dose of saxenda. Hali Grayson RN

## 2023-09-15 NOTE — TELEPHONE ENCOUNTER
RECORDS RECEIVED FROM: Internal   REASON FOR VISIT: migraine    Date of Appt: 11/21/2023   NOTES (FOR ALL VISITS) STATUS DETAILS   OFFICE NOTE from referring provider Internal 05/25/2023 Dr [facility]    OFFICE NOTE from other specialist N/A    DISCHARGE SUMMARY from hospital N/A    DISCHARGE REPORT from the ER Internal 12/06/2019 [facility] ED    OPERATIVE REPORT N/A    MYLA Virus Labs (MS ONLY) N/A    EMG N/A    EEG N/A    MEDICATION LIST N/A    IMAGING  (FOR ALL VISITS)     LUMBAR PUNCTURE N/A    CHERELLE SCAN (MOVEMENT) N/A    ULTRASOUND (CAROTID BILAT) *VASCULAR* N/A    MRI (HEAD, NECK, SPINE) N/A    CT (HEAD, NECK, SPINE) Internal 12/06/2019 orbital head

## 2023-09-25 ENCOUNTER — E-VISIT (OUTPATIENT)
Dept: FAMILY MEDICINE | Facility: CLINIC | Age: 28
End: 2023-09-25
Payer: COMMERCIAL

## 2023-09-25 DIAGNOSIS — S16.1XXD STRAIN OF NECK MUSCLE, SUBSEQUENT ENCOUNTER: Primary | ICD-10-CM

## 2023-09-25 PROCEDURE — 99421 OL DIG E/M SVC 5-10 MIN: CPT | Performed by: PHYSICIAN ASSISTANT

## 2023-09-25 RX ORDER — CYCLOBENZAPRINE HCL 5 MG
5-10 TABLET ORAL 3 TIMES DAILY PRN
Qty: 60 TABLET | Refills: 1 | Status: SHIPPED | OUTPATIENT
Start: 2023-09-25

## 2023-09-25 NOTE — PATIENT INSTRUCTIONS
Brandyn Velazco,     Sorry to hear that the upper back and neck area have been causing discomfort. I sent Cyclobenzaprine (Flexeril) into the pharmacy for you. You can take 1-2 tablets up to three times daily as needed. For some it does cause fatigue/sedation.     Sometimes it is also helpful to do a course of steroids. If interested in this let me know and I can send that to the pharmacy as well.     Please let me know if you have any questions/concerns.  Amrita Rose

## 2023-09-26 ENCOUNTER — APPOINTMENT (OUTPATIENT)
Dept: URBAN - METROPOLITAN AREA CLINIC 252 | Age: 28
Setting detail: DERMATOLOGY
End: 2023-09-26

## 2023-09-26 VITALS — WEIGHT: 180 LBS | HEIGHT: 62 IN

## 2023-09-26 DIAGNOSIS — L70.0 ACNE VULGARIS: ICD-10-CM

## 2023-09-26 DIAGNOSIS — L738 OTHER SPECIFIED DISEASES OF HAIR AND HAIR FOLLICLES: ICD-10-CM

## 2023-09-26 DIAGNOSIS — L663 OTHER SPECIFIED DISEASES OF HAIR AND HAIR FOLLICLES: ICD-10-CM

## 2023-09-26 PROBLEM — L02.425 FURUNCLE OF RIGHT LOWER LIMB: Status: ACTIVE | Noted: 2023-09-26

## 2023-09-26 PROBLEM — L02.426 FURUNCLE OF LEFT LOWER LIMB: Status: ACTIVE | Noted: 2023-09-26

## 2023-09-26 PROCEDURE — OTHER PRESCRIPTION: OTHER

## 2023-09-26 PROCEDURE — 99213 OFFICE O/P EST LOW 20 MIN: CPT

## 2023-09-26 PROCEDURE — OTHER URINE PREGNANCY TEST: OTHER

## 2023-09-26 PROCEDURE — OTHER ADDITIONAL NOTES: OTHER

## 2023-09-26 PROCEDURE — OTHER COUNSELING: OTHER

## 2023-09-26 PROCEDURE — 81025 URINE PREGNANCY TEST: CPT

## 2023-09-26 RX ORDER — CLINDAMYCIN PHOSPHATE 10 MG/ML
LOTION TOPICAL BID
Qty: 60 | Refills: 11 | Status: ERX | COMMUNITY
Start: 2023-09-26

## 2023-09-26 ASSESSMENT — LOCATION DETAILED DESCRIPTION DERM
LOCATION DETAILED: LEFT ANTERIOR DISTAL THIGH
LOCATION DETAILED: RIGHT ANTERIOR DISTAL THIGH

## 2023-09-26 ASSESSMENT — LOCATION SIMPLE DESCRIPTION DERM
LOCATION SIMPLE: LEFT THIGH
LOCATION SIMPLE: RIGHT THIGH

## 2023-09-26 ASSESSMENT — LOCATION ZONE DERM: LOCATION ZONE: LEG

## 2023-09-26 NOTE — PROCEDURE: ADDITIONAL NOTES
Render Risk Assessment In Note?: no
Additional Notes: Patient here today for final UPT upon completing the course of isotretinoin. Her last isotretinoin pill was taken greater than or equal to 30 days ago. Negative UPT was documented in iP[employer]. Recommended returning to clinic should the acne begin to recur. Patient expressed understanding.\\nContinue tretinoin 0.025% qhs.
Detail Level: Zone

## 2023-09-26 NOTE — HPI: RASH
Is This A New Presentation, Or A Follow-Up?: Rash
Additional History: Clindamycin worked well in past. Shaves agains  the grain.

## 2023-09-26 NOTE — HPI: PIMPLES (ACNE)
Is This A New Presentation, Or A Follow-Up?: Acne
Additional Comments (Use Complete Sentences): Completed 9 months isotretinoin June 2023. Using tretinoin 0.025% cream.  She reports no new pimples other than a single small pimple 1 week ago.  Washes face bid.

## 2023-09-27 ENCOUNTER — TELEPHONE (OUTPATIENT)
Dept: NEUROLOGY | Facility: CLINIC | Age: 28
End: 2023-09-27
Payer: COMMERCIAL

## 2023-09-27 NOTE — TELEPHONE ENCOUNTER
Called and left message. Patient is on the waitlist, and would like to know if patient would like a video visit with Dr. Sierra this friday 9/28.

## 2023-10-06 DIAGNOSIS — R14.0 ABDOMINAL BLOATING: ICD-10-CM

## 2023-10-06 DIAGNOSIS — K59.00 CONSTIPATION, UNSPECIFIED CONSTIPATION TYPE: ICD-10-CM

## 2023-10-06 RX ORDER — LUBIPROSTONE 24 UG/1
24 CAPSULE ORAL 2 TIMES DAILY WITH MEALS
Qty: 180 CAPSULE | Refills: 0 | Status: SHIPPED | OUTPATIENT
Start: 2023-10-06 | End: 2024-01-08

## 2023-10-06 NOTE — TELEPHONE ENCOUNTER
Routing refill request to provider for review/approval because:  Drug not on the [facility] refill protocol     Sofia Wilson RN

## 2023-10-12 ENCOUNTER — OFFICE VISIT (OUTPATIENT)
Dept: URGENT CARE | Facility: URGENT CARE | Age: 28
End: 2023-10-12
Payer: COMMERCIAL

## 2023-10-12 VITALS
WEIGHT: 183 LBS | RESPIRATION RATE: 18 BRPM | OXYGEN SATURATION: 100 % | TEMPERATURE: 97.5 F | DIASTOLIC BLOOD PRESSURE: 83 MMHG | HEART RATE: 91 BPM | BODY MASS INDEX: 33.47 KG/M2 | SYSTOLIC BLOOD PRESSURE: 120 MMHG

## 2023-10-12 DIAGNOSIS — R30.0 DYSURIA: ICD-10-CM

## 2023-10-12 DIAGNOSIS — J06.9 VIRAL URI: ICD-10-CM

## 2023-10-12 DIAGNOSIS — N30.01 ACUTE CYSTITIS WITH HEMATURIA: Primary | ICD-10-CM

## 2023-10-12 LAB
ALBUMIN UR-MCNC: NEGATIVE MG/DL
APPEARANCE UR: ABNORMAL
BACTERIA #/AREA URNS HPF: ABNORMAL /HPF
BILIRUB UR QL STRIP: NEGATIVE
COLOR UR AUTO: YELLOW
GLUCOSE UR STRIP-MCNC: NEGATIVE MG/DL
HGB UR QL STRIP: ABNORMAL
KETONES UR STRIP-MCNC: NEGATIVE MG/DL
LEUKOCYTE ESTERASE UR QL STRIP: ABNORMAL
NITRATE UR QL: NEGATIVE
PH UR STRIP: 6 [PH] (ref 5–7)
RBC #/AREA URNS AUTO: ABNORMAL /HPF
SP GR UR STRIP: <=1.005 (ref 1–1.03)
SQUAMOUS #/AREA URNS AUTO: ABNORMAL /LPF
UROBILINOGEN UR STRIP-ACNC: 0.2 E.U./DL
WBC #/AREA URNS AUTO: ABNORMAL /HPF

## 2023-10-12 PROCEDURE — 99213 OFFICE O/P EST LOW 20 MIN: CPT | Performed by: PHYSICIAN ASSISTANT

## 2023-10-12 PROCEDURE — 81001 URINALYSIS AUTO W/SCOPE: CPT | Performed by: PHYSICIAN ASSISTANT

## 2023-10-12 PROCEDURE — 87086 URINE CULTURE/COLONY COUNT: CPT | Performed by: PHYSICIAN ASSISTANT

## 2023-10-12 PROCEDURE — 87186 SC STD MICRODIL/AGAR DIL: CPT | Performed by: PHYSICIAN ASSISTANT

## 2023-10-12 RX ORDER — SULFAMETHOXAZOLE/TRIMETHOPRIM 800-160 MG
1 TABLET ORAL 2 TIMES DAILY
Qty: 6 TABLET | Refills: 0 | Status: SHIPPED | OUTPATIENT
Start: 2023-10-12 | End: 2023-10-15

## 2023-10-12 NOTE — PROGRESS NOTES
Assessment & Plan     Acute cystitis with hematuria  - Adult Urology  Referral; Future  - sulfamethoxazole-trimethoprim (BACTRIM DS) 800-160 MG tablet; Take 1 tablet by mouth 2 times daily for 3 days    Dysuria  - UA Macroscopic with reflex to Microscopic and Culture - Lab Collect; Future  - UA Macroscopic with reflex to Microscopic and Culture - Lab Collect  - Urine Microscopic Exam  - Urine Culture    Viral URI  Continue symptomatic measures    UA with leukocytes and 25-50 white blood cells, 10-25 red blood cells.  Bactrim twice daily for 3 days.  She states that this is her sixth urinary tract infection in a year.  We discussed basic hygiene including keeping the vaginal area clean and dry, changing out of wet clothes and wet swimming suit, wiping from front to back and urinating after intercourse.  Drink plenty of fluids and urinate when you feel the urge.  She states that she does all these things and cannot seem to find a source or common thread with her urinary tract infections.  She like to see a urologist.    Return in about 3 days (around 10/15/2023) for visit with primary care provider if not improving.     Amanda Linton PA-C  [facility]    Subjective   Mora Maldonado is a 28 year old who presents for the following health issues     Patient presents with:  Urgent Care  UTI: Per patient symptoms started 2-3 days urgency, frequency, burning sensation , blood and pelvic pain  Sinus Problem: Per patient symptoms started 3 days ago headache, sinus pressure /pain     REUBEN Hinton presents clinic today for evaluation of dysuria associated with bilateral lower abdominal cramping.  Symptoms been going on for 2 days.  She feels that she needs to urinate more often as well.  The last day, she noticed blood in her urine as well.  No fevers or change in chronic low back pain.    She also states that she has had sinus pain and pressure for the last 3 days.  Symptoms do seem  to be improving.  She had a headache as well.    Review of Systems   ROS negative except as stated above.      Objective    /83   Pulse 91   Temp 97.5  F (36.4  C) (Tympanic)   Resp 18   Wt 83 kg (183 lb)   SpO2 100%   BMI 33.47 kg/m    Physical Exam   GENERAL: healthy, alert and no distress  EYES: Eyes grossly normal to inspection, PERRL and conjunctivae and sclerae normal  HENT: ear canals and TM's normal, nose and mouth without ulcers or lesions  NECK: no adenopathy, no asymmetry, masses, or scars and thyroid normal to palpation  RESP: lungs clear to auscultation - no rales, rhonchi or wheezes  CV: regular rate and rhythm, normal S1 S2, no S3 or S4, no murmur, click or rub, no peripheral edema and peripheral pulses strong  ABDOMEN: soft, nontender  BACK: no CVA tenderness    Results for orders placed or performed in visit on 10/12/23   UA Macroscopic with reflex to Microscopic and Culture - Lab Collect     Status: Abnormal    Specimen: Urine, Midstream   Result Value Ref Range    Color Urine Yellow Colorless, Straw, Light Yellow, Yellow    Appearance Urine Slightly Cloudy (A) Clear    Glucose Urine Negative Negative mg/dL    Bilirubin Urine Negative Negative    Ketones Urine Negative Negative mg/dL    Specific Gravity Urine <=1.005 1.003 - 1.035    Blood Urine Moderate (A) Negative    pH Urine 6.0 5.0 - 7.0    Protein Albumin Urine Negative Negative mg/dL    Urobilinogen Urine 0.2 0.2, 1.0 E.U./dL    Nitrite Urine Negative Negative    Leukocyte Esterase Urine Moderate (A) Negative   Urine Microscopic Exam     Status: Abnormal   Result Value Ref Range    Bacteria Urine Moderate (A) None Seen /HPF    RBC Urine 10-25 (A) 0-2 /HPF /HPF    WBC Urine 25-50 (A) 0-5 /HPF /HPF    Squamous Epithelials Urine Moderate (A) None Seen /LPF

## 2023-10-13 LAB — BACTERIA UR CULT: ABNORMAL

## 2023-10-15 ENCOUNTER — MYC REFILL (OUTPATIENT)
Dept: FAMILY MEDICINE | Facility: CLINIC | Age: 28
End: 2023-10-15
Payer: COMMERCIAL

## 2023-10-15 DIAGNOSIS — F43.23 ADJUSTMENT DISORDER WITH MIXED ANXIETY AND DEPRESSED MOOD: ICD-10-CM

## 2023-10-16 RX ORDER — BUPROPION HYDROCHLORIDE 300 MG/1
300 TABLET ORAL EVERY MORNING
Qty: 30 TABLET | Refills: 0 | OUTPATIENT
Start: 2023-10-16

## 2023-10-17 NOTE — NURSING NOTE
"Chief Complaint   Patient presents with     Physical     /72 (Cuff Size: Adult Regular)   Pulse 91   Temp 97.2  F (36.2  C) (Tympanic)   Resp 18   Ht 1.575 m (5' 2\")   Wt 86.2 kg (190 lb)   LMP  (LMP Unknown)   SpO2 100%   BMI 34.75 kg/m   Estimated body mass index is 34.75 kg/m  as calculated from the following:    Height as of this encounter: 1.575 m (5' 2\").    Weight as of this encounter: 86.2 kg (190 lb).  Patient presents to the clinic using No DME      Health Maintenance that is potentially due pending provider review:    Health Maintenance Due   Topic Date Due     ANNUAL REVIEW OF HM ORDERS  11/18/2022     DTAP/TDAP/TD IMMUNIZATION (6 - Td or Tdap) 01/12/2023     TSH W/FREE T4 REFLEX  06/02/2023                " Patient Education Patient Education Med Reconciliation

## 2023-11-16 ENCOUNTER — MYC REFILL (OUTPATIENT)
Dept: FAMILY MEDICINE | Facility: CLINIC | Age: 28
End: 2023-11-16
Payer: COMMERCIAL

## 2023-11-16 DIAGNOSIS — F43.23 ADJUSTMENT DISORDER WITH MIXED ANXIETY AND DEPRESSED MOOD: ICD-10-CM

## 2023-11-19 RX ORDER — BUPROPION HYDROCHLORIDE 300 MG/1
300 TABLET ORAL EVERY MORNING
Qty: 30 TABLET | Refills: 0 | Status: SHIPPED | OUTPATIENT
Start: 2023-11-19 | End: 2023-12-24

## 2023-11-21 ENCOUNTER — PRE VISIT (OUTPATIENT)
Dept: NEUROLOGY | Facility: CLINIC | Age: 28
End: 2023-11-21

## 2023-11-30 ENCOUNTER — APPOINTMENT (OUTPATIENT)
Dept: URBAN - METROPOLITAN AREA CLINIC 252 | Age: 28
Setting detail: DERMATOLOGY
End: 2023-11-30

## 2023-11-30 VITALS — HEIGHT: 62 IN | WEIGHT: 180 LBS

## 2023-11-30 DIAGNOSIS — L70.0 ACNE VULGARIS: ICD-10-CM

## 2023-11-30 PROCEDURE — 99214 OFFICE O/P EST MOD 30 MIN: CPT

## 2023-11-30 PROCEDURE — OTHER COUNSELING: OTHER

## 2023-11-30 PROCEDURE — OTHER ADDITIONAL NOTES: OTHER

## 2023-11-30 PROCEDURE — OTHER PRESCRIPTION: OTHER

## 2023-11-30 RX ORDER — DAPSONE 75 MG/G
GEL TOPICAL QAM
Qty: 60 | Refills: 11 | Status: ERX | COMMUNITY
Start: 2023-11-30

## 2023-11-30 NOTE — HPI: PIMPLES (ACNE)
Is This A New Presentation, Or A Follow-Up?: Acne
Additional Comments (Use Complete Sentences): Using tretinoin 0.025% qhs and this causes some dryness.  Uses generic CeraVe.     Has been progressively worsening.

## 2023-11-30 NOTE — PROCEDURE: COUNSELING
Dapsone Counseling: I discussed with the patient the risks of dapsone including but not limited to hemolytic anemia, agranulocytosis, rashes, methemoglobinemia, kidney failure, peripheral neuropathy, headaches, GI upset, and liver toxicity.  Patients who start dapsone require monitoring including baseline LFTs and weekly CBCs for the first month, then every month thereafter.  The patient verbalized understanding of the proper use and possible adverse effects of dapsone.  All of the patient's questions and concerns were addressed.
Azithromycin Pregnancy And Lactation Text: This medication is considered safe during pregnancy and is also secreted in breast milk.
Doxycycline Counseling:  Patient counseled regarding possible photosensitivity and increased risk for sunburn.  Patient instructed to avoid sunlight, if possible.  When exposed to sunlight, patients should wear protective clothing, sunglasses, and sunscreen.  The patient was instructed to call the office immediately if the following severe adverse effects occur:  hearing changes, easy bruising/bleeding, severe headache, or vision changes.  The patient verbalized understanding of the proper use and possible adverse effects of doxycycline.  All of the patient's questions and concerns were addressed.
Winlevi Counseling:  I discussed with the patient the risks of topical clascoterone including but not limited to erythema, scaling, itching, and stinging. Patient voiced their understanding.
Topical Sulfur Applications Counseling: Topical Sulfur Counseling: Patient counseled that this medication may cause skin irritation or allergic reactions.  In the event of skin irritation, the patient was advised to reduce the amount of the drug applied or use it less frequently.   The patient verbalized understanding of the proper use and possible adverse effects of topical sulfur application.  All of the patient's questions and concerns were addressed.
Tetracycline Counseling: Patient counseled regarding possible photosensitivity and increased risk for sunburn.  Patient instructed to avoid sunlight, if possible.  When exposed to sunlight, patients should wear protective clothing, sunglasses, and sunscreen.  The patient was instructed to call the office immediately if the following severe adverse effects occur:  hearing changes, easy bruising/bleeding, severe headache, or vision changes.  The patient verbalized understanding of the proper use and possible adverse effects of tetracycline.  All of the patient's questions and concerns were addressed. Patient understands to avoid pregnancy while on therapy due to potential birth defects.
High Dose Vitamin A Pregnancy And Lactation Text: High dose vitamin A therapy is contraindicated during pregnancy and breast feeding.
Benzoyl Peroxide Pregnancy And Lactation Text: This medication is Pregnancy Category C. It is unknown if benzoyl peroxide is excreted in breast milk.
Use Enhanced Medication Counseling?: No
Tazorac Counseling:  Patient advised that medication is irritating and drying.  Patient may need to apply sparingly and wash off after an hour before eventually leaving it on overnight.  The patient verbalized understanding of the proper use and possible adverse effects of tazorac.  All of the patient's questions and concerns were addressed.
Winlevi Pregnancy And Lactation Text: This medication is considered safe during pregnancy and breastfeeding.
Birth Control Pills Counseling: Birth Control Pill Counseling: I discussed with the patient the potential side effects of OCPs including but not limited to increased risk of stroke, heart attack, thrombophlebitis, deep venous thrombosis, hepatic adenomas, breast changes, GI upset, headaches, and depression.  The patient verbalized understanding of the proper use and possible adverse effects of OCPs. All of the patient's questions and concerns were addressed.
Topical Clindamycin Counseling: Patient counseled that this medication may cause skin irritation or allergic reactions.  In the event of skin irritation, the patient was advised to reduce the amount of the drug applied or use it less frequently.   The patient verbalized understanding of the proper use and possible adverse effects of clindamycin.  All of the patient's questions and concerns were addressed.
Spironolactone Counseling: Patient advised regarding risks of diarrhea, abdominal pain, hyperkalemia, birth defects (for female patients), liver toxicity and renal toxicity. The patient may need blood work to monitor liver and kidney function and potassium levels while on therapy. The patient verbalized understanding of the proper use and possible adverse effects of spironolactone.  All of the patient's questions and concerns were addressed.
Isotretinoin Pregnancy And Lactation Text: This medication is Pregnancy Category X and is considered extremely dangerous during pregnancy. It is unknown if it is excreted in breast milk.
Azelaic Acid Pregnancy And Lactation Text: This medication is considered safe during pregnancy and breast feeding.
Bactrim Counseling:  I discussed with the patient the risks of sulfa antibiotics including but not limited to GI upset, allergic reaction, drug rash, diarrhea, dizziness, photosensitivity, and yeast infections.  Rarely, more serious reactions can occur including but not limited to aplastic anemia, agranulocytosis, methemoglobinemia, blood dyscrasias, liver or kidney failure, lung infiltrates or desquamative/blistering drug rashes.
Aklief Pregnancy And Lactation Text: It is unknown if this medication is safe to use during pregnancy.  It is unknown if this medication is excreted in breast milk.  Breastfeeding women should use the topical cream on the smallest area of the skin for the shortest time needed while breastfeeding.  Do not apply to nipple and areola.
Erythromycin Pregnancy And Lactation Text: This medication is Pregnancy Category B and is considered safe during pregnancy. It is also excreted in breast milk.
Sarecycline Counseling: Patient advised regarding possible photosensitivity and discoloration of the teeth, skin, lips, tongue and gums.  Patient instructed to avoid sunlight, if possible.  When exposed to sunlight, patients should wear protective clothing, sunglasses, and sunscreen.  The patient was instructed to call the office immediately if the following severe adverse effects occur:  hearing changes, easy bruising/bleeding, severe headache, or vision changes.  The patient verbalized understanding of the proper use and possible adverse effects of sarecycline.  All of the patient's questions and concerns were addressed.
Azithromycin Counseling:  I discussed with the patient the risks of azithromycin including but not limited to GI upset, allergic reaction, drug rash, diarrhea, and yeast infections.
Benzoyl Peroxide Counseling: Patient counseled that medicine may cause skin irritation and bleach clothing.  In the event of skin irritation, the patient was advised to reduce the amount of the drug applied or use it less frequently.   The patient verbalized understanding of the proper use and possible adverse effects of benzoyl peroxide.  All of the patient's questions and concerns were addressed.
Spironolactone Pregnancy And Lactation Text: This medication can cause feminization of the male fetus and should be avoided during pregnancy. The active metabolite is also found in breast milk.
Topical Clindamycin Pregnancy And Lactation Text: This medication is Pregnancy Category B and is considered safe during pregnancy. It is unknown if it is excreted in breast milk.
High Dose Vitamin A Counseling: Side effects reviewed, pt to contact office should one occur.
Topical Retinoid counseling:  Patient advised to apply a pea-sized amount only at bedtime and wait 30 minutes after washing their face before applying.  If too drying, patient may add a non-comedogenic moisturizer. The patient verbalized understanding of the proper use and possible adverse effects of retinoids.  All of the patient's questions and concerns were addressed.
Tetracycline Pregnancy And Lactation Text: This medication is Pregnancy Category D and not consider safe during pregnancy. It is also excreted in breast milk.
Minocycline Counseling: Patient advised regarding possible photosensitivity and discoloration of the teeth, skin, lips, tongue and gums.  Patient instructed to avoid sunlight, if possible.  When exposed to sunlight, patients should wear protective clothing, sunglasses, and sunscreen.  The patient was instructed to call the office immediately if the following severe adverse effects occur:  hearing changes, easy bruising/bleeding, severe headache, or vision changes.  The patient verbalized understanding of the proper use and possible adverse effects of minocycline.  All of the patient's questions and concerns were addressed.
Doxycycline Pregnancy And Lactation Text: This medication is Pregnancy Category D and not consider safe during pregnancy. It is also excreted in breast milk but is considered safe for shorter treatment courses.
Dapsone Pregnancy And Lactation Text: This medication is Pregnancy Category C and is not considered safe during pregnancy or breast feeding.
Topical Sulfur Applications Pregnancy And Lactation Text: This medication is Pregnancy Category C and has an unknown safety profile during pregnancy. It is unknown if this topical medication is excreted in breast milk.
Bactrim Pregnancy And Lactation Text: This medication is Pregnancy Category D and is known to cause fetal risk.  It is also excreted in breast milk.
Aklief counseling:  Patient advised to apply a pea-sized amount only at bedtime and wait 30 minutes after washing their face before applying.  If too drying, patient may add a non-comedogenic moisturizer.  The most commonly reported side effects including irritation, redness, scaling, dryness, stinging, burning, itching, and increased risk of sunburn.  The patient verbalized understanding of the proper use and possible adverse effects of retinoids.  All of the patient's questions and concerns were addressed.
Topical Retinoid Pregnancy And Lactation Text: This medication is Pregnancy Category C. It is unknown if this medication is excreted in breast milk.
Erythromycin Counseling:  I discussed with the patient the risks of erythromycin including but not limited to GI upset, allergic reaction, drug rash, diarrhea, increase in liver enzymes, and yeast infections.
Birth Control Pills Pregnancy And Lactation Text: This medication should be avoided if pregnant and for the first 30 days post-partum.
Detail Level: Zone
Isotretinoin Counseling: Patient should get monthly blood tests, not donate blood, not drive at night if vision affected, not share medication, and not undergo elective surgery for 6 months after tx completed. Side effects reviewed, pt to contact office should one occur.
Azelaic Acid Counseling: Patient counseled that medicine may cause skin irritation and to avoid applying near the eyes.  In the event of skin irritation, the patient was advised to reduce the amount of the drug applied or use it less frequently.   The patient verbalized understanding of the proper use and possible adverse effects of azelaic acid.  All of the patient's questions and concerns were addressed.
Tazorac Pregnancy And Lactation Text: This medication is not safe during pregnancy. It is unknown if this medication is excreted in breast milk.

## 2023-11-30 NOTE — PROCEDURE: ADDITIONAL NOTES
Render Risk Assessment In Note?: no
Detail Level: Detailed
Additional Notes: - Initiation - Dapsone 7.5% gel compounded with niacinamide 4%: Apply to the affected areas once nightly.\\n- Continue - Tretinoin 0.025% creamm Apply a pea size amount to the entire face once nightly.

## 2023-12-24 ENCOUNTER — MYC REFILL (OUTPATIENT)
Dept: FAMILY MEDICINE | Facility: CLINIC | Age: 28
End: 2023-12-24
Payer: COMMERCIAL

## 2023-12-24 DIAGNOSIS — F43.23 ADJUSTMENT DISORDER WITH MIXED ANXIETY AND DEPRESSED MOOD: ICD-10-CM

## 2023-12-26 NOTE — TELEPHONE ENCOUNTER
"Requested Prescriptions   Pending Prescriptions Disp Refills    buPROPion (WELLBUTRIN XL) 300 MG 24 hr tablet 30 tablet 0     Sig: Take 1 tablet (300 mg) by mouth every morning       SSRIs Protocol Failed - 12/24/2023 10:44 PM        Failed - PHQ-9 score less than 5 in past 6 months     Please review last PHQ-9 score.           Passed - Medication is Bupropion     If the medication is Bupropion (Wellbutrin), and the patient is taking for smoking cessation; OK to refill.          Passed - Medication is active on med list        Passed - Patient is age 18 or older        Passed - No active pregnancy on record        Passed - No positive pregnancy test in last 12 months        Passed - Recent (6 mo) or future (30 days) visit within the authorizing provider's specialty     Patient had office visit in the last 6 months or has a visit in the next 30 days with authorizing provider or within the authorizing provider's specialty.  See \"Patient Info\" tab in inbasket, or \"Choose Columns\" in Meds & Orders section of the refill encounter.                 "

## 2023-12-27 RX ORDER — BUPROPION HYDROCHLORIDE 300 MG/1
300 TABLET ORAL EVERY MORNING
Qty: 30 TABLET | Refills: 0 | Status: SHIPPED | OUTPATIENT
Start: 2023-12-27 | End: 2024-01-14

## 2024-01-05 DIAGNOSIS — K59.00 CONSTIPATION, UNSPECIFIED CONSTIPATION TYPE: ICD-10-CM

## 2024-01-05 DIAGNOSIS — R14.0 ABDOMINAL BLOATING: ICD-10-CM

## 2024-01-05 NOTE — TELEPHONE ENCOUNTER
Routing refill request to provider for review/approval because:  Drug not on the [facility] refill protocol     Sfoia Wilson Rn

## 2024-01-06 DIAGNOSIS — E66.811 CLASS 1 OBESITY WITH SERIOUS COMORBIDITY AND BODY MASS INDEX (BMI) OF 34.0 TO 34.9 IN ADULT, UNSPECIFIED OBESITY TYPE: ICD-10-CM

## 2024-01-08 DIAGNOSIS — E03.9 HYPOTHYROIDISM, UNSPECIFIED TYPE: ICD-10-CM

## 2024-01-08 RX ORDER — LEVOTHYROXINE SODIUM 125 UG/1
TABLET ORAL
Qty: 90 TABLET | Refills: 0 | Status: SHIPPED | OUTPATIENT
Start: 2024-01-08 | End: 2024-07-08

## 2024-01-08 RX ORDER — PEN NEEDLE, DIABETIC 32GX 5/32"
NEEDLE, DISPOSABLE MISCELLANEOUS
Qty: 100 EACH | Refills: 1 | Status: SHIPPED | OUTPATIENT
Start: 2024-01-08 | End: 2024-04-05

## 2024-01-08 RX ORDER — LUBIPROSTONE 24 UG/1
24 CAPSULE ORAL 2 TIMES DAILY WITH MEALS
Qty: 180 CAPSULE | Refills: 0 | Status: SHIPPED | OUTPATIENT
Start: 2024-01-08 | End: 2024-07-08

## 2024-01-11 DIAGNOSIS — F43.23 ADJUSTMENT DISORDER WITH MIXED ANXIETY AND DEPRESSED MOOD: ICD-10-CM

## 2024-01-11 NOTE — TELEPHONE ENCOUNTER
Routing refill request to provider for review/approval because:  Labs not current:        11/18/2021     5:27 PM 6/2/2022     5:02 PM 5/25/2023     5:08 PM   PHQ   PHQ-9 Total Score 11 10 13   Q9: Thoughts of better off dead/self-harm past 2 weeks Not at all Not at all Not at all   Last Written Prescription Date:  12/27/23  Last Fill Quantity: 30,  # refills: 0   Last office visit: 6/2/2022 ; last virtual visit: Visit date not found with prescribing provider:     Future Office Visit:      Julie Behrendt RN

## 2024-01-14 RX ORDER — BUPROPION HYDROCHLORIDE 300 MG/1
300 TABLET ORAL EVERY MORNING
Qty: 30 TABLET | Refills: 0 | Status: SHIPPED | OUTPATIENT
Start: 2024-01-14 | End: 2024-02-29

## 2024-02-06 ENCOUNTER — OFFICE VISIT (OUTPATIENT)
Dept: FAMILY MEDICINE | Facility: CLINIC | Age: 29
End: 2024-02-06
Payer: COMMERCIAL

## 2024-02-06 VITALS
HEART RATE: 88 BPM | HEIGHT: 62 IN | DIASTOLIC BLOOD PRESSURE: 80 MMHG | OXYGEN SATURATION: 100 % | WEIGHT: 198 LBS | SYSTOLIC BLOOD PRESSURE: 110 MMHG | TEMPERATURE: 98.4 F | RESPIRATION RATE: 14 BRPM | BODY MASS INDEX: 36.44 KG/M2

## 2024-02-06 DIAGNOSIS — Z00.00 ROUTINE HISTORY AND PHYSICAL EXAMINATION OF ADULT: Primary | ICD-10-CM

## 2024-02-06 DIAGNOSIS — E66.812 CLASS 2 OBESITY DUE TO EXCESS CALORIES WITHOUT SERIOUS COMORBIDITY WITH BODY MASS INDEX (BMI) OF 35.0 TO 35.9 IN ADULT: ICD-10-CM

## 2024-02-06 DIAGNOSIS — E66.09 CLASS 2 OBESITY DUE TO EXCESS CALORIES WITHOUT SERIOUS COMORBIDITY WITH BODY MASS INDEX (BMI) OF 35.0 TO 35.9 IN ADULT: ICD-10-CM

## 2024-02-06 DIAGNOSIS — Z12.4 CERVICAL CANCER SCREENING: ICD-10-CM

## 2024-02-06 DIAGNOSIS — E03.9 HYPOTHYROIDISM, UNSPECIFIED TYPE: ICD-10-CM

## 2024-02-06 LAB
CHOLEST SERPL-MCNC: 178 MG/DL
FASTING STATUS PATIENT QL REPORTED: NO
HDLC SERPL-MCNC: 32 MG/DL
LDLC SERPL CALC-MCNC: 111 MG/DL
NONHDLC SERPL-MCNC: 146 MG/DL
TRIGL SERPL-MCNC: 174 MG/DL
TSH SERPL DL<=0.005 MIU/L-ACNC: 1.13 UIU/ML (ref 0.3–4.2)

## 2024-02-06 PROCEDURE — G0145 SCR C/V CYTO,THINLAYER,RESCR: HCPCS | Performed by: FAMILY MEDICINE

## 2024-02-06 PROCEDURE — 99395 PREV VISIT EST AGE 18-39: CPT | Performed by: FAMILY MEDICINE

## 2024-02-06 PROCEDURE — 84443 ASSAY THYROID STIM HORMONE: CPT | Performed by: FAMILY MEDICINE

## 2024-02-06 PROCEDURE — 87624 HPV HI-RISK TYP POOLED RSLT: CPT | Performed by: FAMILY MEDICINE

## 2024-02-06 PROCEDURE — 80061 LIPID PANEL: CPT | Performed by: FAMILY MEDICINE

## 2024-02-06 PROCEDURE — 99213 OFFICE O/P EST LOW 20 MIN: CPT | Mod: 25 | Performed by: FAMILY MEDICINE

## 2024-02-06 PROCEDURE — 36415 COLL VENOUS BLD VENIPUNCTURE: CPT | Performed by: FAMILY MEDICINE

## 2024-02-06 RX ORDER — TIRZEPATIDE 7.5 MG/.5ML
7.5 INJECTION, SOLUTION SUBCUTANEOUS
Qty: 6 ML | Refills: 3 | Status: SHIPPED | OUTPATIENT
Start: 2024-04-02 | End: 2024-02-07

## 2024-02-06 RX ORDER — TIRZEPATIDE 5 MG/.5ML
5 INJECTION, SOLUTION SUBCUTANEOUS
Qty: 2 ML | Refills: 0 | Status: SHIPPED | OUTPATIENT
Start: 2024-03-05 | End: 2024-04-02

## 2024-02-06 RX ORDER — TIRZEPATIDE 2.5 MG/.5ML
2.5 INJECTION, SOLUTION SUBCUTANEOUS
Qty: 2 ML | Refills: 0 | Status: SHIPPED | OUTPATIENT
Start: 2024-02-06 | End: 2024-02-29

## 2024-02-06 SDOH — HEALTH STABILITY: PHYSICAL HEALTH: ON AVERAGE, HOW MANY DAYS PER WEEK DO YOU ENGAGE IN MODERATE TO STRENUOUS EXERCISE (LIKE A BRISK WALK)?: 6 DAYS

## 2024-02-06 ASSESSMENT — SOCIAL DETERMINANTS OF HEALTH (SDOH): HOW OFTEN DO YOU GET TOGETHER WITH FRIENDS OR RELATIVES?: ONCE A WEEK

## 2024-02-06 ASSESSMENT — PAIN SCALES - GENERAL: PAINLEVEL: NO PAIN (0)

## 2024-02-06 NOTE — NURSING NOTE
"Chief Complaint   Patient presents with    Physical       Initial /80   Pulse 88   Temp 98.4  F (36.9  C) (Tympanic)   Resp 14   Ht 1.585 m (5' 2.4\")   Wt 89.8 kg (198 lb)   LMP  (LMP Unknown)   SpO2 100%   BMI 35.75 kg/m   Estimated body mass index is 35.75 kg/m  as calculated from the following:    Height as of this encounter: 1.585 m (5' 2.4\").    Weight as of this encounter: 89.8 kg (198 lb).    Patient presents to the clinic using No DME    Is there anyone who you would like to be able to receive your results? No  If yes have patient fill out KEN[facility]      "

## 2024-02-06 NOTE — PROGRESS NOTES
"Preventive Care Visit  [facility]  Abdoul Tinoco MD, Family Medicine  Feb 6, 2024    Assessment & Plan     Routine history and physical examination of adult  Medically doing well.  Weight management discussed.  Saxenda unavailable, Mounjaro prescribed.  Recommended regular exercise, healthy diet and weight loss.  Cervical Pap smear obtained for cancer screening    Cervical cancer screening  - Pap Screen with HPV - recommended age 30 - 65 years    Hypothyroidism, unspecified type  TSH ordered, will titrate levothyroxine dose accordingly  - Lipid panel; Future  - TSH with free T4 reflex; Future    Class 2 obesity due to excess calories without serious comorbidity with body mass index (BMI) of 35.0 to 35.9 in adult  As above  - tirzepatide (MOUNJARO) 2.5 MG/0.5ML pen; Inject 2.5 mg Subcutaneous every 7 days for 28 days  - tirzepatide (MOUNJARO) 5 MG/0.5ML pen; Inject 5 mg Subcutaneous every 7 days for 28 days  - tirzepatide (MOUNJARO) 7.5 MG/0.5ML pen; Inject 7.5 mg Subcutaneous every 7 days      BMI  Estimated body mass index is 35.75 kg/m  as calculated from the following:    Height as of this encounter: 1.585 m (5' 2.4\").    Weight as of this encounter: 89.8 kg (198 lb).   Weight management plan: Discussed healthy diet and exercise guidelines    Counseling  Appropriate preventive services were discussed with this patient, including applicable screening as appropriate for fall prevention, nutrition, physical activity, Tobacco-use cessation, weight loss and cognition.  Checklist reviewing preventive services available has been given to the patient.  Reviewed patient's diet, addressing concerns and/or questions.   She is at risk for psychosocial distress and has been provided with information to reduce risk.       Tomasz Velazco is a 28 year old, presenting for the following:  Physical        2/6/2024     5:06 PM   Additional Questions   Roomed by Miriam TANNER   Accompanied by self    "     Health Care Directive  Patient does not have a Health Care Directive or Living Will: Discussed advance care planning with patient; information given to patient to review.    HPI      2/6/2024   General Health   How would you rate your overall physical health? Good   Feel stress (tense, anxious, or unable to sleep) Only a little   (!) STRESS CONCERN      2/6/2024   Nutrition   Three or more servings of calcium each day? (!) NO   Diet: Regular (no restrictions)   How many servings of fruit and vegetables per day? (!) 0-1   How many sweetened beverages each day? 0-1         2/6/2024   Exercise   Days per week of moderate/strenous exercise 6 days         2/6/2024   Social Factors   Frequency of gathering with friends or relatives Once a week   Worry food won't last until get money to buy more No   Food not last or not have enough money for food? No   Do you have housing?  Yes   Are you worried about losing your housing? No   Lack of transportation? No   Unable to get utilities (heat,electricity)? No         2/6/2024   Dental   Dentist two times every year? Yes         2/6/2024   TB Screening   Were you born outside of [place]?  No         Today's PHQ-2 Score:       2/6/2024     5:00 PM   PHQ-2 ( 1999 [employer])   Q1: Little interest or pleasure in doing things 0   Q2: Feeling down, depressed or hopeless 1   PHQ-2 Score 1   Q1: Little interest or pleasure in doing things Not at all   Q2: Feeling down, depressed or hopeless Several days   PHQ-2 Score 1           2/6/2024   Substance Use   Alcohol more than 3/day or more than 7/wk No   Do you use any other substances recreationally? No     Social History     Tobacco Use    Smoking status: Never     Passive exposure: Never    Smokeless tobacco: Never   Vaping Use    Vaping Use: Never used   Substance Use Topics    Alcohol use: Yes     Comment: once a month    Drug use: Never             2/6/2024   Breast Cancer Screening   Family history of breast, colon, or ovarian cancer? No  / Unknown           2024   STI Screening   New sexual partner(s) since last STI/HIV test? No     History of abnormal Pap smear:         Latest Ref Rng & Units 2020    10:24 AM 2020     9:40 AM 9/3/2019     5:30 PM   PAP / HPV   PAP (Historical)  NIL   LSIL    HPV 16 DNA NEG^Negative  Negative     HPV 18 DNA NEG^Negative  Negative     Other HR HPV NEG^Negative  Negative             2024   Contraception/Family Planning   Questions about contraception or family planning No        Reviewed and updated as needed this visit by Provider                    Past Medical History:   Diagnosis Date    Abnormal Pap smear of cervix 2019    See problem list    Anxiety     Depressive disorder     Hypothyroidism     Thyroid disease     Uncomplicated asthma      Past Surgical History:   Procedure Laterality Date    COLONOSCOPY N/A 6/3/2021    Procedure: COLONOSCOPY, WITH BIOPSY;  Surgeon: Lindy Garcia MD;  Location: [facility] OR    ESOPHAGOSCOPY, GASTROSCOPY, DUODENOSCOPY (EGD), COMBINED N/A 6/3/2021    Procedure: ESOPHAGOGASTRODUODENOSCOPY, WITH BIOPSY;  Surgeon: Lindy Garcia MD;  Location: [facility] OR    LAPAROSCOPIC CHOLECYSTECTOMY N/A 5/15/2020    Procedure: laparoscopic cholecystectomy;  Surgeon: Peter Hidalgo MD;  Location: [facility] OR     OB History    Para Term  AB Living   0 0 0 0 0 0   SAB IAB Ectopic Multiple Live Births   0 0 0 0 0     Lab work is in process  Labs reviewed in EPIC  BP Readings from Last 3 Encounters:   24 110/80   10/12/23 120/83   23 126/82    Wt Readings from Last 3 Encounters:   24 89.8 kg (198 lb)   10/12/23 83 kg (183 lb)   23 82.4 kg (181 lb 9.6 oz)                  Patient Active Problem List   Diagnosis    Papanicolaou smear of cervix with low grade squamous intraepithelial lesion (LGSIL)    Hypothyroidism, unspecified type    Adjustment disorder with mixed anxiety and depressed mood    IUD (intrauterine device) in  place - due for removal 11/2026    Gluten-sensitive enteropathy     Past Surgical History:   Procedure Laterality Date    COLONOSCOPY N/A 6/3/2021    Procedure: COLONOSCOPY, WITH BIOPSY;  Surgeon: Lindy Garcia MD;  [place] [facility] [place]    ESOPHAGOSCOPY, GASTROSCOPY, DUODENOSCOPY (EGD), COMBINED N/A 6/3/2021    Procedure: ESOPHAGOGASTRODUODENOSCOPY, WITH BIOPSY;  Surgeon: Lindy Garcia MD;  Location: [facility] OR    LAPAROSCOPIC CHOLECYSTECTOMY N/A 5/15/2020    Procedure: laparoscopic cholecystectomy;  Surgeon: Peter Hidalgo MD;  Location: [facility] OR       Social History     Tobacco Use    Smoking status: Never     Passive exposure: Never    Smokeless tobacco: Never   Substance Use Topics    Alcohol use: Yes     Comment: once a month     Family History   Problem Relation Age of Onset    Rheumatoid Arthritis Mother     Thyroid Disease Maternal Grandmother     Hypertension Sister     Crohn's Disease No family hx of     Ulcerative Colitis No family hx of          Current Outpatient Medications   Medication Sig Dispense Refill    buPROPion (WELLBUTRIN XL) 300 MG 24 hr tablet TAKE 1 TABLET BY MOUTH EVERY MORNING 30 tablet 0    cyclobenzaprine (FLEXERIL) 5 MG tablet Take 1-2 tablets (5-10 mg) by mouth 3 times daily as needed for muscle spasms 60 tablet 1    levonorgestrel (MIRENA) 20 MCG/24HR IUD 1 each (20 mcg) by Intrauterine route once      levothyroxine (SYNTHROID/LEVOTHROID) 125 MCG tablet TAKE 1 TABLET BY MOUTH EVERY DAY 90 tablet 0    lubiprostone (AMITIZA) 24 MCG capsule TAKE 1 CAPSULE (24 MCG) BY MOUTH 2 TIMES DAILY (WITH MEALS) 180 capsule 0    tirzepatide (MOUNJARO) 2.5 MG/0.5ML pen Inject 2.5 mg Subcutaneous every 7 days for 28 days 2 mL 0    [START ON 3/5/2024] tirzepatide (MOUNJARO) 5 MG/0.5ML pen Inject 5 mg Subcutaneous every 7 days for 28 days 2 mL 0    [START ON 4/2/2024] tirzepatide (MOUNJARO) 7.5 MG/0.5ML pen Inject 7.5 mg Subcutaneous every 7 days 6 mL 3    insulin pen needle (BD  "LINDSAY U/F) 32G X 4 MM miscellaneous USE 1 PEN NEEDLES DAILY WITH SAXENDA 100 each 1    SUMAtriptan (IMITREX) 25 MG tablet Take 1 tablet (25 mg) by mouth at onset of headache for migraine May repeat in 2 hours. Max 8 tablets/24 hours. (Patient not taking: Reported on 7/11/2023) 18 tablet 1    VENTOLIN  (90 Base) MCG/ACT inhaler Inhale 2 puffs into the lungs 4 times daily as needed 18 g 3     Allergies   Allergen Reactions    Amoxicillin Hives    Penicillins Hives     Recent Labs   Lab Test 05/25/23  1757 06/02/22  1738 07/18/21  1402 04/13/21  1614 11/11/20  1045 07/31/20 2017 05/19/20  2118 05/13/20  1722 05/05/20  1822 01/20/20  1439 11/12/19  1650   A1C  --   --   --   --   --   --   --   --   --   --  4.9   LDL  --   --   --   --  159*  --   --   --   --   --  62   HDL  --   --   --   --  42*  --   --   --   --   --  32*   TRIG  --   --   --   --  101  --   --   --   --   --  195*   ALT  --   --   --   --   --   --  31 21 20   < >  --    CR 0.99*  --   --  0.90  --   --  0.69 0.77 0.74   < >  --    GFRESTIMATED 78  --   --  87  --   --  >90 >90 >90   < >  --    GFRESTBLACK  --   --   --  >90  --   --  >90 >90 >90   < >  --    POTASSIUM 4.0  --   --  4.3  --   --  3.9 4.1 4.1   < >  --    TSH 1.59 1.15   < > 0.12*  --    < >  --  6.56*  --    < > 0.12*    < > = values in this interval not displayed.      Review of Systems    Review of Systems  Constitutional, neuro, ENT, endocrine, pulmonary, cardiac, gastrointestinal, genitourinary, musculoskeletal, integument and psychiatric systems are negative, except as otherwise noted.     Objective    Exam  /80   Pulse 88   Temp 98.4  F (36.9  C) (Tympanic)   Resp 14   Ht 1.585 m (5' 2.4\")   Wt 89.8 kg (198 lb)   LMP  (LMP Unknown)   SpO2 100%   BMI 35.75 kg/m     Estimated body mass index is 35.75 kg/m  as calculated from the following:    Height as of this encounter: 1.585 m (5' 2.4\").    Weight as of this encounter: 89.8 kg (198 lb).    Physical " Exam  GENERAL: alert and no distress  NECK: no adenopathy, no asymmetry, masses, or scars  RESP: lungs clear to auscultation - no rales, rhonchi or wheezes  CV: regular rate and rhythm, normal S1 S2, no S3 or S4, no murmur, click or rub, no peripheral edema  ABDOMEN: soft, nontender, no hepatosplenomegaly, no masses and bowel sounds normal   (female): normal female external genitalia, normal urethral meatus , normal vaginal mucosa, normal cervix, adnexae, and uterus without masses., and IUD well-placed  MS: no gross musculoskeletal defects noted, no edema  SKIN: no suspicious lesions or rashes  NEURO: Normal strength and tone, mentation intact and speech normal  PSYCH: mentation appears normal, affect normal/bright      Signed Electronically by: Abdoul Tinoco MD

## 2024-02-08 LAB
BKR LAB AP GYN ADEQUACY: NORMAL
BKR LAB AP GYN INTERPRETATION: NORMAL
BKR LAB AP HPV REFLEX: NORMAL
BKR LAB AP PREVIOUS ABNORMAL: NORMAL
PATH REPORT.COMMENTS IMP SPEC: NORMAL
PATH REPORT.COMMENTS IMP SPEC: NORMAL
PATH REPORT.RELEVANT HX SPEC: NORMAL

## 2024-02-09 LAB
HUMAN PAPILLOMA VIRUS 16 DNA: NEGATIVE
HUMAN PAPILLOMA VIRUS 18 DNA: NEGATIVE
HUMAN PAPILLOMA VIRUS FINAL DIAGNOSIS: NORMAL
HUMAN PAPILLOMA VIRUS OTHER HR: NEGATIVE

## 2024-02-12 PROBLEM — R87.612 PAPANICOLAOU SMEAR OF CERVIX WITH LOW GRADE SQUAMOUS INTRAEPITHELIAL LESION (LGSIL): Status: ACTIVE | Noted: 2019-09-03

## 2024-02-29 ENCOUNTER — MYC REFILL (OUTPATIENT)
Dept: FAMILY MEDICINE | Facility: CLINIC | Age: 29
End: 2024-02-29
Payer: COMMERCIAL

## 2024-02-29 DIAGNOSIS — F43.23 ADJUSTMENT DISORDER WITH MIXED ANXIETY AND DEPRESSED MOOD: ICD-10-CM

## 2024-02-29 DIAGNOSIS — E66.09 CLASS 2 OBESITY DUE TO EXCESS CALORIES WITHOUT SERIOUS COMORBIDITY WITH BODY MASS INDEX (BMI) OF 35.0 TO 35.9 IN ADULT: ICD-10-CM

## 2024-02-29 DIAGNOSIS — E66.812 CLASS 2 OBESITY DUE TO EXCESS CALORIES WITHOUT SERIOUS COMORBIDITY WITH BODY MASS INDEX (BMI) OF 35.0 TO 35.9 IN ADULT: ICD-10-CM

## 2024-02-29 RX ORDER — TIRZEPATIDE 2.5 MG/.5ML
2.5 INJECTION, SOLUTION SUBCUTANEOUS
Qty: 2 ML | Refills: 0 | Status: SHIPPED | OUTPATIENT
Start: 2024-02-29 | End: 2024-04-05

## 2024-02-29 RX ORDER — BUPROPION HYDROCHLORIDE 300 MG/1
300 TABLET ORAL EVERY MORNING
Qty: 30 TABLET | Refills: 0 | Status: SHIPPED | OUTPATIENT
Start: 2024-02-29 | End: 2024-03-11

## 2024-02-29 NOTE — TELEPHONE ENCOUNTER
Routing refill request to provider for review/approval because:  Labs not current:        11/18/2021     5:27 PM 6/2/2022     5:02 PM 5/25/2023     5:08 PM   PHQ   PHQ-9 Total Score 11 10 13   Q9: Thoughts of better off dead/self-harm past 2 weeks Not at all Not at all Not at all         12/23/2020     1:42 PM 11/18/2021     5:27 PM 6/2/2022     5:03 PM   MOHINDER-7 SCORE   Total Score   13 (moderate anxiety)   Total Score 21 9 13     PHQ-9/MOHINDER-7 questionnaires sent to patient via [employer] to complete.    Last Written Prescription Date:  1/14/24  Last Fill Quantity: 30,  # refills: 0   Last office visit: 2/6/2024 ; last virtual visit: Visit date not found with prescribing provider:     Future Office Visit:  none    Julie Behrendt RN

## 2024-03-09 ENCOUNTER — MYC MEDICAL ADVICE (OUTPATIENT)
Dept: FAMILY MEDICINE | Facility: CLINIC | Age: 29
End: 2024-03-09
Payer: COMMERCIAL

## 2024-03-09 DIAGNOSIS — F43.23 ADJUSTMENT DISORDER WITH MIXED ANXIETY AND DEPRESSED MOOD: ICD-10-CM

## 2024-03-11 RX ORDER — BUPROPION HYDROCHLORIDE 300 MG/1
300 TABLET ORAL EVERY MORNING
Qty: 90 TABLET | Refills: 1 | Status: SHIPPED | OUTPATIENT
Start: 2024-03-11 | End: 2024-08-01

## 2024-04-05 ENCOUNTER — VIRTUAL VISIT (OUTPATIENT)
Dept: FAMILY MEDICINE | Facility: CLINIC | Age: 29
End: 2024-04-05
Payer: COMMERCIAL

## 2024-04-05 DIAGNOSIS — Z97.5 IUD (INTRAUTERINE DEVICE) IN PLACE: ICD-10-CM

## 2024-04-05 DIAGNOSIS — K21.00 GASTROESOPHAGEAL REFLUX DISEASE WITH ESOPHAGITIS WITHOUT HEMORRHAGE: ICD-10-CM

## 2024-04-05 DIAGNOSIS — E66.812 CLASS 2 OBESITY DUE TO EXCESS CALORIES WITHOUT SERIOUS COMORBIDITY WITH BODY MASS INDEX (BMI) OF 35.0 TO 35.9 IN ADULT: Primary | ICD-10-CM

## 2024-04-05 DIAGNOSIS — E66.09 CLASS 2 OBESITY DUE TO EXCESS CALORIES WITHOUT SERIOUS COMORBIDITY WITH BODY MASS INDEX (BMI) OF 35.0 TO 35.9 IN ADULT: Primary | ICD-10-CM

## 2024-04-05 PROCEDURE — 99213 OFFICE O/P EST LOW 20 MIN: CPT | Mod: 95 | Performed by: PHYSICIAN ASSISTANT

## 2024-04-05 RX ORDER — TIRZEPATIDE 2.5 MG/.5ML
2.5 INJECTION, SOLUTION SUBCUTANEOUS
Qty: 2 ML | Refills: 0 | Status: SHIPPED | OUTPATIENT
Start: 2024-04-05 | End: 2024-04-08

## 2024-04-05 NOTE — PROGRESS NOTES
"  Instructions Relayed to Patient by Virtual Roomer:     Patient is active on [employer]:   Relayed following to patient: \"It looks like you are active on [employer]hart, are you able to join the visit this way? If not, do you need us to send you a link now or would you like your provider to send a link via text or email when they are ready to initiate the visit?\"    Reminded patient to ensure they were logged on to virtual visit by arrival time listed. Documented in appointment notes if patient had flexibility to initiate visit sooner than arrival time. If pediatric virtual visit, ensured pediatric patient along with parent/guardian will be present for video visit.     Patient offered the website [email or web address] and/or phone number to [employer] Help line: [phone]    Mora is a 29 year old who is being evaluated via a billable video visit.    How would you like to obtain your AVS? Engana Ptyhar[employer]  If the video visit is dropped, the invitation should be resent by: Text to cell phone: [phone]  Will anyone else be joining your video visit? No        Subjective   Mora is a 29 year old, presenting for the following health issues:  Weight Loss      4/5/2024    12:44 PM   Additional Questions   Roomed by ana   Accompanied by self     History of Present Illness       Reason for visit:  Weight management    She eats 0-1 servings of fruits and vegetables daily.She consumes 0 sweetened beverage(s) daily.She exercises with enough effort to increase her heart rate 10 to 19 minutes per day.  She exercises with enough effort to increase her heart rate 4 days per week.   She is taking medications regularly.       Pt has been prescribed mounjaro for weight loss but the provider you put in the order didn't do his part and was wanting to see this provider for that. Patient reports that despite making diet and exercise adjustments she just has not had good results for weight loss. Previous provider sent order but " the pharmacy needed further information and this was never completed.     Pt also wants to talk about potentially getting off IUD and something for acid reflux. Acid reflux is intermittent. Talking with boyfriend about possibility of children. Will keep IUD for now but discussed certainly could remove whenever she is ready.       Review of Systems  Constitutional, HEENT, cardiovascular, pulmonary, GI, , musculoskeletal, neuro, skin, endocrine and psych systems are negative, except as otherwise noted.      Objective           Vitals:  No vitals were obtained today due to virtual visit.    Physical Exam   GENERAL: alert and no distress  EYES: Eyes grossly normal to inspection.  No discharge or erythema, or obvious scleral/conjunctival abnormalities.  RESP: No audible wheeze, cough, or visible cyanosis.    SKIN: Visible skin clear. No significant rash, abnormal pigmentation or lesions.  NEURO: Cranial nerves grossly intact.  Mentation and speech appropriate for age.  PSYCH: Appropriate affect, tone, and pace of words      Assessment & Plan     Class 2 obesity due to excess calories without serious comorbidity with body mass index (BMI) of 35.0 to 35.9 in adult  Weight addressed. Encouraged ongoing diet and exercise modifications as able. Referral placed to weight management but also sent order for Mounjaro as requested pending insurance approval.  Appropriate use, side effects and dose titration if needed discussed.  - Adult Comprehensive Weight Management  Referral; Future    IUD (intrauterine device) in place - due for removal 2026  Discussed certainly can remove anytime. Not  for another 2.5 years.     Gastroesophageal reflux disease with esophagitis without hemorrhage  Intermittent - discussed avoidance of triggers, limiting caffeine/alcohol/tobacco, eating smaller meals. Trial of Tums or Famotidine on an as needed basis.     Video-Visit Details    Type of service:  Video Visit   Originating  Location (pt. Location): Home    Distant Location (provider location):  On-site  Platform used for Video Visit: Mark  Signed Electronically by: Amrita Rose PA-C

## 2024-04-06 ENCOUNTER — MYC MEDICAL ADVICE (OUTPATIENT)
Dept: FAMILY MEDICINE | Facility: CLINIC | Age: 29
End: 2024-04-06
Payer: COMMERCIAL

## 2024-04-06 DIAGNOSIS — R73.03 PREDIABETES: ICD-10-CM

## 2024-04-06 DIAGNOSIS — E78.5 HYPERLIPIDEMIA LDL GOAL <100: Primary | ICD-10-CM

## 2024-04-06 DIAGNOSIS — E66.09 CLASS 2 OBESITY DUE TO EXCESS CALORIES WITHOUT SERIOUS COMORBIDITY WITH BODY MASS INDEX (BMI) OF 35.0 TO 35.9 IN ADULT: ICD-10-CM

## 2024-04-06 DIAGNOSIS — E66.812 CLASS 2 OBESITY DUE TO EXCESS CALORIES WITHOUT SERIOUS COMORBIDITY WITH BODY MASS INDEX (BMI) OF 35.0 TO 35.9 IN ADULT: ICD-10-CM

## 2024-04-08 RX ORDER — TIRZEPATIDE 2.5 MG/.5ML
2.5 INJECTION, SOLUTION SUBCUTANEOUS
Qty: 2 ML | Refills: 0 | Status: SHIPPED | OUTPATIENT
Start: 2024-04-08 | End: 2024-04-09

## 2024-04-08 NOTE — TELEPHONE ENCOUNTER
Mounjaro sent 4/5/24 with follow diagnosis - Class 2 obesity due to excess calories without serious comorbidity with body mass index (BMI) of 35.0 to 35.9 in adult [E66.09, Z68.35].    Insurance requires alternate diagnosis for coverage.    Mikki HURTADON, RN

## 2024-04-10 ENCOUNTER — TELEPHONE (OUTPATIENT)
Dept: FAMILY MEDICINE | Facility: CLINIC | Age: 29
End: 2024-04-10
Payer: COMMERCIAL

## 2024-04-10 NOTE — TELEPHONE ENCOUNTER
Prior Authorization Retail Medication Request    Medication/Dose: ZEPBOUND   Diagnosis and ICD code (if different than what is on RX):  E78.65, E66.09,Z68.35 and R73.03  New/renewal/insurance change PA/secondary ins. PA:  Previously Tried and Failed:    Rationale:  despite making diet and exercise adjustments she just has not had good results for weight loss in addition to see Dx codes above      Amrita Baires MA

## 2024-04-23 NOTE — TELEPHONE ENCOUNTER
Prior Authorization Approval    Authorization Effective Date: 3/24/2024  Authorization Expiration Date: 4/23/2025  Medication: Zepbound-APPROVED  Reference #:     Insurance Company: [employer] - Phone [phone] Fax [phone]  Which Pharmacy is filling the prescription (Not needed for infusion/clinic administered): [id number] [place] [facility] - [address]  Pharmacy Notified: Yes  Patient Notified: Instructed pharmacy to notify patient when script is ready to /ship.

## 2024-04-23 NOTE — TELEPHONE ENCOUNTER
Retail Pharmacy Prior Authorization Team   Phone: [phone]    PA Initiation    Medication: ZEPBOUND 2.5 MG/0.5ML SC SOAJ  Insurance Company: [employer] - Phone [phone] Fax [phone]  [id number] [place], [place] - [address] [place] [address]  Filling Pharmacy Phone: [phone]  Filling Pharmacy Fax:    Start Date: 4/23/2024

## 2024-05-15 ENCOUNTER — MYC REFILL (OUTPATIENT)
Dept: FAMILY MEDICINE | Facility: CLINIC | Age: 29
End: 2024-05-15
Payer: COMMERCIAL

## 2024-05-15 DIAGNOSIS — R73.03 PREDIABETES: ICD-10-CM

## 2024-05-15 DIAGNOSIS — E66.09 CLASS 2 OBESITY DUE TO EXCESS CALORIES WITHOUT SERIOUS COMORBIDITY WITH BODY MASS INDEX (BMI) OF 35.0 TO 35.9 IN ADULT: ICD-10-CM

## 2024-05-15 DIAGNOSIS — E66.812 CLASS 2 OBESITY DUE TO EXCESS CALORIES WITHOUT SERIOUS COMORBIDITY WITH BODY MASS INDEX (BMI) OF 35.0 TO 35.9 IN ADULT: ICD-10-CM

## 2024-05-15 DIAGNOSIS — E78.5 HYPERLIPIDEMIA LDL GOAL <100: ICD-10-CM

## 2024-07-07 DIAGNOSIS — R14.0 ABDOMINAL BLOATING: ICD-10-CM

## 2024-07-07 DIAGNOSIS — K59.00 CONSTIPATION, UNSPECIFIED CONSTIPATION TYPE: ICD-10-CM

## 2024-07-07 DIAGNOSIS — E03.9 HYPOTHYROIDISM, UNSPECIFIED TYPE: ICD-10-CM

## 2024-07-08 ENCOUNTER — TELEPHONE (OUTPATIENT)
Dept: GASTROENTEROLOGY | Facility: CLINIC | Age: 29
End: 2024-07-08
Payer: COMMERCIAL

## 2024-07-08 RX ORDER — LEVOTHYROXINE SODIUM 125 UG/1
TABLET ORAL
Qty: 90 TABLET | Refills: 0 | Status: SHIPPED | OUTPATIENT
Start: 2024-07-08

## 2024-07-08 RX ORDER — LUBIPROSTONE 24 UG/1
24 CAPSULE ORAL 2 TIMES DAILY WITH MEALS
Qty: 180 CAPSULE | Refills: 0 | Status: SHIPPED | OUTPATIENT
Start: 2024-07-08

## 2024-07-08 NOTE — TELEPHONE ENCOUNTER
Routing refill request to provider for review/approval because:  Drug not on the [facility] refill protocol   Patient needs to be seen because it has been more than 1 year since last office visit.    Sofia Wilson RN

## 2024-07-08 NOTE — TELEPHONE ENCOUNTER
BRISA to return call. Please assist patient is scheduling a follow up appointment with Dr. Bobby. Patient is due to be seen for an annual follow up for medication review before additional refills can be authorized. Number left for scheduling.    Ruma Mark cma.....7/8/2024 at 9:27 AM

## 2024-07-15 ENCOUNTER — TELEPHONE (OUTPATIENT)
Dept: GASTROENTEROLOGY | Facility: CLINIC | Age: 29
End: 2024-07-15
Payer: COMMERCIAL

## 2024-07-15 NOTE — TELEPHONE ENCOUNTER
PA Initiation    Medication:  Lubiprostone 24mcg BID  Insurance Company:    [facility]  Filling Pharmacy Phone:  [phone]  Filling Pharmacy Fax:  [phone]  Start Date:  07/08/2024

## 2024-07-16 NOTE — TELEPHONE ENCOUNTER
Form faxed to PA number.  Incoming fax received stating Missing/illegible on Rx -  Further clarification *PA REQUIRED* Please call [phone].  Info included on fax:  Patient ID: [id number]  Bin: [id number]  [facility] GRP: [id number]  Ruma Mark cma.....7/16/2024 at 2:42 PM

## 2024-07-17 NOTE — TELEPHONE ENCOUNTER
Per note below, clinic is managing this PA. Due to this, clinic is responsible for this PA including all follow ups. Per [facility] Team SOP, we are unable to manage any PA request started by the clinic due to documentation reasons. Clinic is responsible for this PA.

## 2024-07-18 DIAGNOSIS — E66.812 CLASS 2 OBESITY DUE TO EXCESS CALORIES WITHOUT SERIOUS COMORBIDITY WITH BODY MASS INDEX (BMI) OF 35.0 TO 35.9 IN ADULT: ICD-10-CM

## 2024-07-18 DIAGNOSIS — E66.09 CLASS 2 OBESITY DUE TO EXCESS CALORIES WITHOUT SERIOUS COMORBIDITY WITH BODY MASS INDEX (BMI) OF 35.0 TO 35.9 IN ADULT: ICD-10-CM

## 2024-07-18 RX ORDER — TIRZEPATIDE 5 MG/.5ML
INJECTION, SOLUTION SUBCUTANEOUS
Qty: 3 ML | Refills: 1 | Status: SHIPPED | OUTPATIENT
Start: 2024-07-18

## 2024-07-18 NOTE — TELEPHONE ENCOUNTER
PA Initiation    Medication: LUBIPROSTONE 24 MCG PO CAPS  Insurance Company: [employer] - Phone [phone] Fax [phone]  [id number] [place] [place] - [address] [place] [address]  Filling Pharmacy Phone: [phone]  Filling Pharmacy Fax: [phone]  Start Date: 7/18/2024

## 2024-07-23 NOTE — TELEPHONE ENCOUNTER
PRIOR AUTHORIZATION DENIED    Medication: LUBIPROSTONE 24 MCG PO CAPS  Insurance Company: [employer] - Phone [phone] Fax [phone]  Denial Date: 7/22/2024  Denial Reason(s):     Appeal Information:       Patient Notified: No, care team must notify

## 2024-07-25 NOTE — TELEPHONE ENCOUNTER
LMN will not be completed at this time, patient saw Dr. Bobby, not Sindhu Nolasco.  Patient has not been seen for over 1 year and will need appointment.  Have attempted to contact with no return call.     Sofia Wilson RN

## 2024-07-25 NOTE — TELEPHONE ENCOUNTER
LM to return call and MM sent. Please assist patient is scheduling a follow up appointment with Dr. Bobby. Patient is due to be seen for an annual follow up for medication review before additional refills can be authorized. Number left for scheduling.    Ruma Mark cma.....7/25/2024 at 8:27 AM

## 2024-07-31 ENCOUNTER — MYC MEDICAL ADVICE (OUTPATIENT)
Dept: FAMILY MEDICINE | Facility: CLINIC | Age: 29
End: 2024-07-31
Payer: COMMERCIAL

## 2024-07-31 DIAGNOSIS — F43.23 ADJUSTMENT DISORDER WITH MIXED ANXIETY AND DEPRESSED MOOD: ICD-10-CM

## 2024-08-01 RX ORDER — BUPROPION HYDROCHLORIDE 300 MG/1
300 TABLET ORAL EVERY MORNING
Qty: 90 TABLET | Refills: 1 | Status: SHIPPED | OUTPATIENT
Start: 2024-08-01

## 2024-10-09 DIAGNOSIS — E03.9 HYPOTHYROIDISM, UNSPECIFIED TYPE: ICD-10-CM

## 2024-10-09 RX ORDER — LEVOTHYROXINE SODIUM 125 UG/1
TABLET ORAL
Qty: 90 TABLET | Refills: 0 | Status: SHIPPED | OUTPATIENT
Start: 2024-10-09

## 2024-10-09 NOTE — TELEPHONE ENCOUNTER
Needs in person clinic appointment for further refills.   Prescription approved per [facility] Refill Protocol.  Julie Behrendt RN

## 2024-12-19 ENCOUNTER — OFFICE VISIT (OUTPATIENT)
Dept: URGENT CARE | Facility: URGENT CARE | Age: 29
End: 2024-12-19
Payer: COMMERCIAL

## 2024-12-19 VITALS
HEART RATE: 86 BPM | BODY MASS INDEX: 35.57 KG/M2 | WEIGHT: 197 LBS | OXYGEN SATURATION: 100 % | DIASTOLIC BLOOD PRESSURE: 82 MMHG | TEMPERATURE: 98.4 F | SYSTOLIC BLOOD PRESSURE: 124 MMHG | RESPIRATION RATE: 16 BRPM

## 2024-12-19 DIAGNOSIS — J22 LOWER RESPIRATORY INFECTION: Primary | ICD-10-CM

## 2024-12-19 RX ORDER — AZITHROMYCIN 250 MG/1
TABLET, FILM COATED ORAL
Qty: 6 TABLET | Refills: 0 | Status: SHIPPED | OUTPATIENT
Start: 2024-12-19 | End: 2024-12-24

## 2024-12-19 RX ORDER — BENZONATATE 100 MG/1
100 CAPSULE ORAL 3 TIMES DAILY PRN
Qty: 30 CAPSULE | Refills: 0 | Status: SHIPPED | OUTPATIENT
Start: 2024-12-19

## 2024-12-20 NOTE — PROGRESS NOTES
Assessment & Plan     Lower respiratory infection  Differential discussed in detail including lower respiratory tract infection, acute bronchitis.  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  Management options discussed.  Azithromycin prescribed to cover possible chest infection.  Suggested to use Tessalon for cough control and continue well hydration, warm fluids, over-the-counter analgesia.  Follow-up if symptoms persist or worsen.  Patient understood and in agreement with the above plan.  All questions answered.  - azithromycin (ZITHROMAX) 250 MG tablet; Take 2 tablets (500 mg) by mouth daily for 1 day, THEN 1 tablet (250 mg) daily for 4 days.  - benzonatate (TESSALON) 100 MG capsule; Take 1 capsule (100 mg) by mouth 3 times daily as needed.        Tomasz Velazco is a 29 year old, presenting for the following health issues:  Cough (X 1 week)    HPI   Concern -   Onset: 1 week  Description: productive cough, congestion, fatigue.  Exposure to walking pneumonia in school  Intensity: moderate  Progression of Symptoms:  worsening  Accompanying Signs & Symptoms: none  Previous history of similar problem: none  Therapies tried and outcome: Over-the-counter cold meds      Review of Systems  Constitutional, HEENT, cardiovascular, pulmonary, gi and gu systems are negative, except as otherwise noted.      Objective    /82   Pulse 86   Temp 98.4  F (36.9  C) (Tympanic)   Resp 16   Wt 89.4 kg (197 lb)   SpO2 100%   BMI 35.57 kg/m    Body mass index is 35.57 kg/m .  Physical Exam   GENERAL: alert and no distress  EYES: Eyes grossly normal to inspection, PERRL and conjunctivae and sclerae normal  HENT: normal cephalic/atraumatic, ear canals and TM's normal, nose and mouth without ulcers or lesions, oropharynx clear, and oral mucous membranes moist  NECK: no adenopathy, no asymmetry, masses, or scars  RESP: lungs clear to auscultation - no rales, rhonchi  or wheezes  CV: regular rate and rhythm, normal S1 S2, no S3 or S4, no murmur, click or rub, no peripheral edema  MS: no gross musculoskeletal defects noted, no edema  SKIN: no suspicious lesions or rashes  NEURO: Normal strength and tone, mentation intact and speech normal  PSYCH: mentation appears normal, affect normal/bright      Signed Electronically by: Abdoul Tinoco MD

## 2025-02-01 DIAGNOSIS — E03.9 HYPOTHYROIDISM, UNSPECIFIED TYPE: ICD-10-CM

## 2025-02-02 ENCOUNTER — MYC REFILL (OUTPATIENT)
Dept: FAMILY MEDICINE | Facility: CLINIC | Age: 30
End: 2025-02-02
Payer: COMMERCIAL

## 2025-02-02 DIAGNOSIS — E03.9 HYPOTHYROIDISM, UNSPECIFIED TYPE: ICD-10-CM

## 2025-02-02 DIAGNOSIS — F43.23 ADJUSTMENT DISORDER WITH MIXED ANXIETY AND DEPRESSED MOOD: ICD-10-CM

## 2025-02-03 RX ORDER — LEVOTHYROXINE SODIUM 125 UG/1
125 TABLET ORAL DAILY
Qty: 90 TABLET | Refills: 0 | Status: SHIPPED | OUTPATIENT
Start: 2025-02-03

## 2025-02-03 RX ORDER — BUPROPION HYDROCHLORIDE 300 MG/1
300 TABLET ORAL EVERY MORNING
Qty: 90 TABLET | Refills: 1 | Status: SHIPPED | OUTPATIENT
Start: 2025-02-03

## 2025-02-03 RX ORDER — LEVOTHYROXINE SODIUM 125 UG/1
TABLET ORAL
Qty: 90 TABLET | Refills: 0 | OUTPATIENT
Start: 2025-02-03

## 2025-02-13 ENCOUNTER — OFFICE VISIT (OUTPATIENT)
Dept: FAMILY MEDICINE | Facility: CLINIC | Age: 30
End: 2025-02-13
Payer: COMMERCIAL

## 2025-02-13 VITALS
SYSTOLIC BLOOD PRESSURE: 118 MMHG | DIASTOLIC BLOOD PRESSURE: 80 MMHG | HEART RATE: 84 BPM | HEIGHT: 62 IN | RESPIRATION RATE: 18 BRPM | WEIGHT: 205.8 LBS | TEMPERATURE: 97.4 F | BODY MASS INDEX: 37.87 KG/M2 | OXYGEN SATURATION: 100 %

## 2025-02-13 DIAGNOSIS — Z00.00 ROUTINE HISTORY AND PHYSICAL EXAMINATION OF ADULT: Primary | ICD-10-CM

## 2025-02-13 DIAGNOSIS — E03.9 HYPOTHYROIDISM, UNSPECIFIED TYPE: ICD-10-CM

## 2025-02-13 DIAGNOSIS — E78.2 MIXED HYPERLIPIDEMIA: ICD-10-CM

## 2025-02-13 DIAGNOSIS — K59.00 CONSTIPATION, UNSPECIFIED CONSTIPATION TYPE: ICD-10-CM

## 2025-02-13 DIAGNOSIS — R14.0 ABDOMINAL BLOATING: ICD-10-CM

## 2025-02-13 LAB
CHOLEST SERPL-MCNC: 191 MG/DL
FASTING STATUS PATIENT QL REPORTED: NO
HDLC SERPL-MCNC: 31 MG/DL
LDLC SERPL CALC-MCNC: 117 MG/DL
NONHDLC SERPL-MCNC: 160 MG/DL
T4 FREE SERPL-MCNC: 0.3 NG/DL (ref 0.9–1.7)
TRIGL SERPL-MCNC: 216 MG/DL
TSH SERPL DL<=0.005 MIU/L-ACNC: 131.4 UIU/ML (ref 0.3–4.2)

## 2025-02-13 RX ORDER — LUBIPROSTONE 24 UG/1
24 CAPSULE ORAL 2 TIMES DAILY WITH MEALS
Qty: 180 CAPSULE | Refills: 1 | Status: SHIPPED | OUTPATIENT
Start: 2025-02-13

## 2025-02-13 SDOH — HEALTH STABILITY: PHYSICAL HEALTH: ON AVERAGE, HOW MANY DAYS PER WEEK DO YOU ENGAGE IN MODERATE TO STRENUOUS EXERCISE (LIKE A BRISK WALK)?: 5 DAYS

## 2025-02-13 SDOH — HEALTH STABILITY: PHYSICAL HEALTH: ON AVERAGE, HOW MANY MINUTES DO YOU ENGAGE IN EXERCISE AT THIS LEVEL?: 10 MIN

## 2025-02-13 ASSESSMENT — PAIN SCALES - GENERAL: PAINLEVEL_OUTOF10: MILD PAIN (2)

## 2025-02-13 ASSESSMENT — SOCIAL DETERMINANTS OF HEALTH (SDOH): HOW OFTEN DO YOU GET TOGETHER WITH FRIENDS OR RELATIVES?: ONCE A WEEK

## 2025-02-13 NOTE — PROGRESS NOTES
"Preventive Care Visit  [facility]  Abdoul Tinoco MD, Family Medicine  Feb 13, 2025      Assessment & Plan     Hypothyroidism, unspecified type  (Z00.00) Routine history and physical examination of adult  (primary encounter diagnosis)  Comment: Medically stable.  Recommended regular exercise, healthy diet and weight loss.  Influenza vaccine administered in office today      (E03.9) Hypothyroidism, unspecified type  Comment: TSH ordered, will titrate levothyroxine dose accordingly  Plan: TSH WITH FREE T4 REFLEX           (K59.00) Constipation, unspecified constipation type  Comment: Lubiprostone refilled and suggested to follow-up with GI as well  Plan: lubiprostone (AMITIZA) 24 MCG capsule, Adult GI         Referral - Consult Only            (R14.0) Abdominal bloating  Comment:   Plan: lubiprostone (AMITIZA) 24 MCG capsule           (E78.2) Mixed hyperlipidemia  Comment: Recommend regular exercise, healthy diet and weight loss  Plan: Lipid panel            BMI  Estimated body mass index is 37.39 kg/m  as calculated from the following:    Height as of this encounter: 1.58 m (5' 2.21\").    Weight as of this encounter: 93.4 kg (205 lb 12.8 oz).   Weight management plan: Discussed healthy diet and exercise guidelines    Counseling  Appropriate preventive services were addressed with this patient via screening, questionnaire, or discussion as appropriate for fall prevention, nutrition, physical activity, Tobacco-use cessation, social engagement, weight loss and cognition.  Checklist reviewing preventive services available has been given to the patient.  Reviewed patient's diet, addressing concerns and/or questions.       Tomasz Velazco is a 29 year old, presenting for the following:  Physical        2/13/2025     4:17 PM   Additional Questions   Roomed by Tran Rizo        HPI  Not using the Zepbound because in the past insurance doesn't pay for it now with the new insurance " she is not sure - but doesn't like the medication because of the side effects - looking for a new alternative     Vaccines: Flu shot today     Health Care Directive  Patient does not have a Health Care Directive: Discussed advance care planning with patient; information given to patient to review.      2/13/2025   General Health   How would you rate your overall physical health? (!) FAIR   Feel stress (tense, anxious, or unable to sleep) Only a little   (!) STRESS CONCERN      2/13/2025   Nutrition   Three or more servings of calcium each day? (!) NO   Diet: Regular (no restrictions)   How many servings of fruit and vegetables per day? (!) 0-1   How many sweetened beverages each day? 0-1         2/13/2025   Exercise   Days per week of moderate/strenous exercise 5 days   Average minutes spent exercising at this level 10 min         2/13/2025   Social Factors   Frequency of gathering with friends or relatives Once a week   Worry food won't last until get money to buy more No   Food not last or not have enough money for food? No   Do you have housing? (Housing is defined as stable permanent housing and does not include staying ouside in a car, in a tent, in an abandoned building, in an overnight shelter, or couch-surfing.) Yes   Are you worried about losing your housing? No   Lack of transportation? No   Unable to get utilities (heat,electricity)? No         2/13/2025   Dental   Dentist two times every year? Yes         2/6/2024   TB Screening   Were you born outside of the [place]? No         Today's PHQ-2 Score:       2/13/2025     4:15 PM   PHQ-2 ( 1999 [employer])   Q1: Little interest or pleasure in doing things 1   Q2: Feeling down, depressed or hopeless 1   PHQ-2 Score 2    Q1: Little interest or pleasure in doing things Several days   Q2: Feeling down, depressed or hopeless Several days   PHQ-2 Score 2       Patient-reported           2/13/2025   Substance Use   Alcohol more than 3/day or more than 7/wk Not Applicable    Do you use any other substances recreationally? No     Social History     Tobacco Use    Smoking status: Never     Passive exposure: Never    Smokeless tobacco: Never   Vaping Use    Vaping status: Never Used   Substance Use Topics    Alcohol use: Yes     Comment: once a month    Drug use: Never               2025   STI Screening   New sexual partner(s) since last STI/HIV test? No     History of Pap smear:         Latest Ref Rng & Units 2024     5:27 PM 2020    10:24 AM 2020     9:40 AM   PAP / HPV   PAP  Negative for Intraepithelial Lesion or Malignancy (NILM)      PAP (Historical)   NIL     HPV 16 DNA Negative Negative   Negative    HPV 18 DNA Negative Negative   Negative    Other HR HPV Negative Negative   Negative            2025   Contraception/Family Planning   Questions about contraception or family planning No        Reviewed and updated as needed this visit by Provider                    Past Medical History:   Diagnosis Date    Abnormal Pap smear of cervix 2019    See problem list    Anxiety     Depressive disorder     Hypothyroidism     Thyroid disease     Uncomplicated asthma      Past Surgical History:   Procedure Laterality Date    COLONOSCOPY N/A 6/3/2021    Procedure: COLONOSCOPY, WITH BIOPSY;  Surgeon: Lindy Garcia MD;  Location: [facility] [place] OR    ESOPHAGOSCOPY, GASTROSCOPY, DUODENOSCOPY (EGD), COMBINED N/A 6/3/2021    Procedure: ESOPHAGOGASTRODUODENOSCOPY, WITH BIOPSY;  Surgeon: Lindy Garcia MD;  Location: [facility] [place] OR    LAPAROSCOPIC CHOLECYSTECTOMY N/A 5/15/2020    Procedure: laparoscopic cholecystectomy;  Surgeon: Peter Hidalgo MD;  Location: [facility] OR     OB History    Para Term  AB Living   0 0 0 0 0 0   SAB IAB Ectopic Multiple Live Births   0 0 0 0 0     Lab work is in process  Labs reviewed in EPIC  BP Readings from Last 3 Encounters:   25 118/80   24 124/82   24 110/80    Wt Readings from Last 3  Encounters:   02/13/25 93.4 kg (205 lb 12.8 oz)   12/19/24 89.4 kg (197 lb)   02/06/24 89.8 kg (198 lb)                  Patient Active Problem List   Diagnosis    Papanicolaou smear of cervix with low grade squamous intraepithelial lesion (LGSIL)    Hypothyroidism, unspecified type    Adjustment disorder with mixed anxiety and depressed mood    IUD (intrauterine device) in place - due for removal 11/2026    Gluten-sensitive enteropathy     Past Surgical History:   Procedure Laterality Date    COLONOSCOPY N/A 6/3/2021    Procedure: COLONOSCOPY, WITH BIOPSY;  Surgeon: Lindy Garcia MD;  Location: [facility] [place]    ESOPHAGOSCOPY, GASTROSCOPY, DUODENOSCOPY (EGD), COMBINED N/A 6/3/2021    Procedure: ESOPHAGOGASTRODUODENOSCOPY, WITH BIOPSY;  Surgeon: Lindy Garcia MD;  Location: [facility] OR    LAPAROSCOPIC CHOLECYSTECTOMY N/A 5/15/2020    Procedure: laparoscopic cholecystectomy;  Surgeon: Peter Hidalgo MD;  Location: [facility] OR       Social History     Tobacco Use    Smoking status: Never     Passive exposure: Never    Smokeless tobacco: Never   Substance Use Topics    Alcohol use: Yes     Comment: once a month     Family History   Problem Relation Age of Onset    Rheumatoid Arthritis Mother     Thyroid Disease Maternal Grandmother     Hypertension Sister     Crohn's Disease No family hx of     Ulcerative Colitis No family hx of          Current Outpatient Medications   Medication Sig Dispense Refill    buPROPion (WELLBUTRIN XL) 300 MG 24 hr tablet Take 1 tablet (300 mg) by mouth every morning. 90 tablet 1    levonorgestrel (MIRENA) 20 MCG/24HR IUD 1 each (20 mcg) by Intrauterine route once      levothyroxine (SYNTHROID/LEVOTHROID) 125 MCG tablet Take 1 tablet (125 mcg) by mouth daily. 90 tablet 0    lubiprostone (AMITIZA) 24 MCG capsule Take 1 capsule (24 mcg) by mouth 2 times daily (with meals). 180 capsule 1    VENTOLIN  (90 Base) MCG/ACT inhaler Inhale 2 puffs into the lungs 4 times daily as  "needed 18 g 3     Allergies   Allergen Reactions    Amoxicillin Hives    Penicillins Hives     Recent Labs   Lab Test 02/06/24  1750 05/25/23  1757 07/18/21  1402 04/13/21  1614 11/11/20  1045 07/31/20 2017 05/19/20  2118 05/13/20  1722 05/05/20  1822 01/20/20  1439 11/12/19  1650   A1C  --   --   --   --   --   --   --   --   --   --  4.9   *  --   --   --  159*  --   --   --   --   --  62   HDL 32*  --   --   --  42*  --   --   --   --   --  32*   TRIG 174*  --   --   --  101  --   --   --   --   --  195*   ALT  --   --   --   --   --   --  31 21 20   < >  --    CR  --  0.99*  --  0.90  --   --  0.69 0.77 0.74   < >  --    GFRESTIMATED  --  78  --  87  --   --  >90 >90 >90   < >  --    GFRESTBLACK  --   --   --  >90  --   --  >90 >90 >90   < >  --    POTASSIUM  --  4.0  --  4.3  --   --  3.9 4.1 4.1   < >  --    TSH 1.13 1.59   < > 0.12*  --    < >  --  6.56*  --    < > 0.12*    < > = values in this interval not displayed.          Review of Systems  Constitutional, neuro, ENT, endocrine, pulmonary, cardiac, gastrointestinal, genitourinary, musculoskeletal, integument and psychiatric systems are negative, except as otherwise noted.     Objective    Exam  /80   Pulse 84   Temp 97.4  F (36.3  C) (Tympanic)   Resp 18   Ht 1.58 m (5' 2.21\")   Wt 93.4 kg (205 lb 12.8 oz)   LMP 02/13/2020 (Exact Date)   SpO2 100%   BMI 37.39 kg/m     Estimated body mass index is 37.39 kg/m  as calculated from the following:    Height as of this encounter: 1.58 m (5' 2.21\").    Weight as of this encounter: 93.4 kg (205 lb 12.8 oz).    Physical Exam  GENERAL: alert and no distress  EYES: Eyes grossly normal to inspection, PERRL and conjunctivae and sclerae normal  HENT: normal cephalic/atraumatic, nose and mouth without ulcers or lesions, oropharynx clear, and oral mucous membranes moist  NECK: no adenopathy, no asymmetry, masses, or scars  RESP: lungs clear to auscultation - no rales, rhonchi or wheezes  CV: regular " rates and rhythm, normal S1 S2, no S3 or S4, and no murmur, click or rub  ABDOMEN: soft, nontender and no organomegaly or masses  MS: no gross musculoskeletal defects noted, no edema  SKIN: no suspicious lesions or rashes  NEURO: Normal strength and tone, mentation intact and speech normal  PSYCH: mentation appears normal, affect normal/bright      Signed Electronically by: Abdoul Tinoco MD

## 2025-02-13 NOTE — NURSING NOTE
"Chief Complaint   Patient presents with    Physical       Initial There were no vitals taken for this visit. Estimated body mass index is 35.57 kg/m  as calculated from the following:    Height as of 2/6/24: 1.585 m (5' 2.4\").    Weight as of 12/19/24: 89.4 kg (197 lb).    Patient presents to the clinic using No DME    Is there anyone who you would like to be able to receive your results? No  If yes have patient fill out KENIA      "

## 2025-02-21 ENCOUNTER — ANCILLARY PROCEDURE (OUTPATIENT)
Dept: GENERAL RADIOLOGY | Facility: CLINIC | Age: 30
End: 2025-02-21
Attending: NURSE PRACTITIONER
Payer: COMMERCIAL

## 2025-02-21 ENCOUNTER — OFFICE VISIT (OUTPATIENT)
Dept: GASTROENTEROLOGY | Facility: CLINIC | Age: 30
End: 2025-02-21
Payer: COMMERCIAL

## 2025-02-21 VITALS
BODY MASS INDEX: 38.09 KG/M2 | HEIGHT: 62 IN | DIASTOLIC BLOOD PRESSURE: 78 MMHG | SYSTOLIC BLOOD PRESSURE: 120 MMHG | HEART RATE: 88 BPM | WEIGHT: 207 LBS

## 2025-02-21 DIAGNOSIS — R14.0 ABDOMINAL BLOATING: Primary | ICD-10-CM

## 2025-02-21 DIAGNOSIS — R10.13 EPIGASTRIC PAIN: ICD-10-CM

## 2025-02-21 DIAGNOSIS — K21.9 GASTROESOPHAGEAL REFLUX DISEASE WITHOUT ESOPHAGITIS: ICD-10-CM

## 2025-02-21 DIAGNOSIS — R14.0 ABDOMINAL BLOATING: ICD-10-CM

## 2025-02-21 DIAGNOSIS — K59.00 CONSTIPATION, UNSPECIFIED CONSTIPATION TYPE: ICD-10-CM

## 2025-02-21 PROCEDURE — 99214 OFFICE O/P EST MOD 30 MIN: CPT | Performed by: NURSE PRACTITIONER

## 2025-02-21 PROCEDURE — 74019 RADEX ABDOMEN 2 VIEWS: CPT | Mod: TC | Performed by: RADIOLOGY

## 2025-02-21 RX ORDER — LUBIPROSTONE 24 UG/1
24 CAPSULE ORAL 2 TIMES DAILY WITH MEALS
Qty: 180 CAPSULE | Refills: 3 | Status: SHIPPED | OUTPATIENT
Start: 2025-02-21

## 2025-02-21 ASSESSMENT — PAIN SCALES - GENERAL: PAINLEVEL_OUTOF10: NO PAIN (0)

## 2025-02-21 NOTE — PROGRESS NOTES
[address] [address]  Phone: [phone]    Patient:  Mora Maldonado, Date of birth 1995    Referring Provider: Abdoul Tinoco      Gastroenterology CLINIC VISIT, RETURN PATIENT    REASON FOR CONSULTATION: follow up    HPI: 29 year old female presented to GI clinic for a follow up on constipation. This is my first encounter with the patient; she was previously seen by Belen HUBBARD PA-C, and Dr. Bobby.  The patient has hx of chronic abdominal pain, constipation, hypothyroidism,  anxiety, cholecystectomy, and possible gluten sensitivity. Noted recent very high TSH at 131.4 with free T4 of 0.3.  Patient is on amitiza 24 mcg twice a day.        PREVIOUS ENDOSCOPY:  6/3/2021 Colonoscopy  Findings:       The perianal and digital rectal examinations were normal.        The entire examined colon appeared normal.        The terminal ileum appeared normal.        Biopsies for histology were taken with a cold forceps from the right        colon and left colon for evaluation of microscopic colitis.        Biopsies were taken with a cold forceps in the terminal ileum for        histology.     6/3/21 EGD  Findings:       The examined esophagus was normal.        The Z-line was regular and was found 39 cm from the incisors.        A few diminutive sessile fundic gland polyps with no bleeding and no        stigmata of recent bleeding were found in the gastric fundus. The polyp        was removed with a cold biopsy forceps. Resection and retrieval were        complete.        The exam was otherwise without abnormality.      FINAL DIAGNOSIS:  A. DUODENAL BIOPSY:  - Duodenal mucosa with no significant histologic abnormality  - No evidence of celiac sprue or peptic duodenitis    B. STOMACH, FUNDIC GLAND POLYP, POLYPECTOMY:  - Fundic gland polyp; negative for dysplasia  - A fragment of duodenal mucosa identified    C. TERMINAL ILEUM, BIOPSY:  - Ileal mucosa with no  significant histologic abnormality    D. RIGHT COLON, BIOPSY:  - Colonic mucosa with no significanthistologic abnormality  - No evidence ofinflammation (including microscopic colitis)    E. RANDOM LEFT COLON AND RECTUM, BIOPSIES:  - Colorectal mucosa with no significanthistologic abnormality  - No evidence ofinflammation (including microscopic colitis)                                                      PERTINENT STUDIES Reviewed in EMR      ROS: 10pt ROS performed and otherwise negative.    PAST MEDICAL HISTORY:  Past Medical History:   Diagnosis Date    Abnormal Pap smear of cervix 09/03/2019    See problem list    Anxiety     Depressive disorder     Hypothyroidism     Thyroid disease     Uncomplicated asthma        PREVIOUS ABDOMINAL/GYNECOLOGIC SURGERIES:  Past Surgical History:   Procedure Laterality Date    COLONOSCOPY N/A 6/3/2021    Procedure: COLONOSCOPY, WITH BIOPSY;  Surgeon: Lindy aGrcia MD;  Location: [facility] [place] OR    ESOPHAGOSCOPY, GASTROSCOPY, DUODENOSCOPY (EGD), COMBINED N/A 6/3/2021    Procedure: ESOPHAGOGASTRODUODENOSCOPY, WITH BIOPSY;  Surgeon: Lindy Garcia MD;  Location: [facility] [place] OR    LAPAROSCOPIC CHOLECYSTECTOMY N/A 5/15/2020    Procedure: laparoscopic cholecystectomy;  Surgeon: Peter Hidalgo MD;  Location: [facility] OR         PERTINENT MEDICATIONS:  Current Outpatient Medications   Medication Sig Dispense Refill    buPROPion (WELLBUTRIN XL) 300 MG 24 hr tablet Take 1 tablet (300 mg) by mouth every morning. 90 tablet 1    levonorgestrel (MIRENA) 20 MCG/24HR IUD 1 each (20 mcg) by Intrauterine route once      levothyroxine (SYNTHROID/LEVOTHROID) 150 MCG tablet Take 1 tablet (150 mcg) by mouth every morning (before breakfast). 90 tablet 1    lubiprostone (AMITIZA) 24 MCG capsule Take 1 capsule (24 mcg) by mouth 2 times daily (with meals). 180 capsule 1    VENTOLIN  (90 Base) MCG/ACT inhaler Inhale 2 puffs into the lungs 4 times daily as needed 18 g 3         SOCIAL  HISTORY:  Social History     Socioeconomic History    Marital status: Single     Spouse name: Not on file    Number of children: Not on file    Years of education: Not on file    Highest education level: Not on file   Occupational History    Not on file   Tobacco Use    Smoking status: Never     Passive exposure: Never    Smokeless tobacco: Never   Vaping Use    Vaping status: Never Used   Substance and Sexual Activity    Alcohol use: Yes     Comment: once a month    Drug use: Never    Sexual activity: Yes     Partners: Male     Birth control/protection: Implant   Other Topics Concern    Parent/sibling w/ CABG, MI or angioplasty before 65F 55M? Not Asked   Social History Narrative    Not on file     Social Drivers of Health     Financial Resource Strain: Low Risk  (2/13/2025)    Financial Resource Strain     Within the past 12 months, have you or your family members you live with been unable to get utilities (heat, electricity) when it was really needed?: No   Food Insecurity: Low Risk  (2/13/2025)    Food Insecurity     Within the past 12 months, did you worry that your food would run out before you got money to buy more?: No     Within the past 12 months, did the food you bought just not last and you didn t have money to get more?: No   Transportation Needs: Low Risk  (2/13/2025)    Transportation Needs     Within the past 12 months, has lack of transportation kept you from medical appointments, getting your medicines, non-medical meetings or appointments, work, or from getting things that you need?: No   Physical Activity: Insufficiently Active (2/13/2025)    Exercise Vital Sign     Days of Exercise per Week: 5 days     Minutes of Exercise per Session: 10 min   Stress: No Stress Concern Present (2/13/2025)    [employer] [place] [employer] - Occupational Stress Questionnaire     Feeling of Stress : Only a little   Social Connections: Unknown (2/13/2025)    Social Connection and Isolation Panel [NHANES]      Frequency of Communication with Friends and Family: Not on file     Frequency of Social Gatherings with Friends and Family: Once a week     Attends Evangelical Services: Not on file     Active Member of Clubs or Organizations: Not on file     Attends Club or Organization Meetings: Not on file     Marital Status: Not on file   Interpersonal Safety: Low Risk  (2/13/2025)    Interpersonal Safety     Do you feel physically and emotionally safe where you currently live?: Yes     Within the past 12 months, have you been hit, slapped, kicked or otherwise physically hurt by someone?: No     Within the past 12 months, have you been humiliated or emotionally abused in other ways by your partner or ex-partner?: No   Housing Stability: Low Risk  (2/13/2025)    Housing Stability     Do you have housing? : Yes     Are you worried about losing your housing?: No       FAMILY HISTORY:  Denies colon/panc/esophageal/other GI CA, no other Blanc or other HPS-related Cheli. No IBD/celiac, no other AI/liver/thyroid disease.    Family History   Problem Relation Age of Onset    Rheumatoid Arthritis Mother     Thyroid Disease Maternal Grandmother     Hypertension Sister     Crohn's Disease No family hx of     Ulcerative Colitis No family hx of        PHYSICAL EXAMINATION:  Vitals reviewed  LMP 02/13/2020 (Exact Date)     General: Patient appears well in no acute distress.    Skin: No visualized rash or lesions on visualized skin  HEENT:    EOMI, no erythema, sclera icterus or discharge noted.  Mouth mucosa intact, pink, moist  No cervical or supraclavicular lymphadenopathy. Thyroid gland not enlarged.  Resp: breathing comfortably without accessory muscle usage, speaking in full sentences, no cough. Lung sounds clear  Card: Regular and rhythmic S1 and S2. No gallop or rub. No murmur.  No LE edema.  Abdomen: Active bowel sounds X 4 quadrants. Soft to palpation.  No guarding or rebound tenderness. Crockett's sign negative.  MSK: Appears to have  normal range of motion based on visualized movements  Neurologic: No apparent tremors, facial movements symmetric  Psych: affect normal, alert and oriented      ASSESSMENT/PLAN:    29 year old female  presented to GI clinic for       Patient verbalized understanding and appreciation of care provided. Stated that all of the questions were answered to her/his satisfaction.  RTC    Thank you for this consultation. It was a pleasure to participate in the care of this patient; please contact us with any further questions.    SHAWNEE Warner, NJP-C  Division of Gastroenterology  [facility], [place], [place]    This note was created with Dragon voice recognition software, and while reviewed for accuracy, inadvertent minor typographic errors may occur. Please contact the provider if you have any questions.     having loose stools every day, reduce the second dose to 12 mcg (educated to open capsules).  - abdominal x-ray today. Patient reported having bowel movement today.  - maintain proper hydration and fiber intake.  - avoid NSAIDs  Patient verbalized understanding and appreciation of care provided. Stated that all of the questions were answered to her/his satisfaction.  RTC in 3 months    Thank you for this consultation. It was a pleasure to participate in the care of this patient; please contact us with any further questions.    SHAWNEE Warner, NJP-C  Division of Gastroenterology  [facility], [place], [place]    This note was created with Dragon voice recognition software, and while reviewed for accuracy, inadvertent minor typographic errors may occur. Please contact the provider if you have any questions.

## 2025-02-21 NOTE — PATIENT INSTRUCTIONS
It was a pleasure taking care of you today.  I've included a brief summary of our discussion and care plan from today's visit below.  Please review this information with your primary care provider.  ______________________________________________________________________    My recommendations are summarized as follows:    1.    2.    3.    4.     Return to GI Clinic in  to review your progress.    ______________________________________________________________________      ____________________________________________________________________  Please see below for any additional questions and scheduling guidelines.    Sign up for PrintLess Plans: [employer] Plans patient portal serves as a secure platform for accessing your medical records from the [facility]. Additionally, [employer] Plans facilitates easy, timely, and secure messaging with your care team. If you have not signed up, you may do so by using the provided code or calling [phone].    Coordinating your care after your visit:  There are multiple options for scheduling your follow-up care based on your provider's recommendation.    How do I schedule a follow-up clinic appointment:   After your appointment, you may receive scheduling assistance with the Clinic Coordinators by having a seat in the waiting room and a Clinic Coordinator will call you up to schedule.  Virtual visits or after you leave the clinic:  Your provider has placed a follow-up order in the [employer] portal for scheduling your return appointment. A member of the scheduling team will contact you to schedule.  [employer] Scheduling: Timely scheduling through [employer] is advised to ensure appointment availability.   Call to schedule: You may schedule your follow-up appointment(s) by calling [phone], option 1.    How do I schedule my endoscopy or colonoscopy procedure:  If a procedure, such as a colonoscopy or upper endoscopy was ordered by your provider, the scheduling team will contact you to schedule  this procedure. Or you may choose to call to schedule at   [phone], option 2.  Please allow 20-30 minutes when scheduling a procedure.    How do I get my blood work done? To get your blood work done, you need to schedule a lab appointment at an [facility]. There are multiple ways to schedule:   At the clinic: The Clinic Coordinator you meet after your visit can help you schedule a lab appointment.   [employer]ite scheduling: [employer] offers online lab scheduling at all [facility] laboratory locations.   Call to schedule: You can call [phone] to schedule your lab appointment.    How do I schedule my imaging study: To schedule imaging studies, such as CT scans, ultrasounds, MRIs, or X-rays, contact Imaging Services at [phone].    How do I schedule a referral to another doctor: If your provider recommended a referral to another specialist(s), the referral order was placed by your provider. You will receive a phone call to schedule this referral, or you may choose to call the number attached to the referral to self-schedule.    For Post-Visit Question(s):  For any inquiries following today's visit:  Please utilize [employer] messaging and allow 48 hours for reply or contact the Call Center during normal business hours at [phone], option 3.  For Emergent After-hours questions, contact the On-Call GI Fellow through the [facility]  at [phone].  In addition, you may contact your Nurse directly using the provided contact information.    Test Results:  Test results will be accessible via [employer]ite in compliance with the 21st Century Cures Act. This means that your results will be available to you at the same time as your provider. Often you may see your results before your provider does. Results are reviewed by staff within two weeks with communication follow-up. Results may be released in the patient portal prior to your care team review.    Prescription Refill(s):   Medication prescribed by your provider will be addressed during your visit. For future refills, please coordinate with your pharmacy. If you have not had a recent clinic visit or routine labs, for your safety, your provider may not be able to refill your prescription.     Sincerely,  SALVATORE Warner  [facility],  Division of Gastroenterology   ([facility])         appointment:   After your appointment, you may receive scheduling assistance with the Clinic Coordinators by having a seat in the waiting room and a Clinic Coordinator will call you up to schedule.  Virtual visits or after you leave the clinic:  Your provider has placed a follow-up order in the [employer] portal for scheduling your return appointment. A member of the scheduling team will contact you to schedule.  [facility] Scheduling: Timely scheduling through [employer] is advised to ensure appointment availability.   Call to schedule: You may schedule your follow-up appointment(s) by calling [phone], option 1.    How do I schedule my endoscopy or colonoscopy procedure:  If a procedure, such as a colonoscopy or upper endoscopy was ordered by your provider, the scheduling team will contact you to schedule this procedure. Or you may choose to call to schedule at   [phone], option 2.  Please allow 20-30 minutes when scheduling a procedure.    How do I get my blood work done? To get your blood work done, you need to schedule a lab appointment at an [facility]. There are multiple ways to schedule:   At the clinic: The Clinic Coordinator you meet after your visit can help you schedule a lab appointment.   [facility] scheduling: [employer] offers online lab scheduling at all [facility] laboratory locations.   Call to schedule: You can call [phone] to schedule your lab appointment.    How do I schedule my imaging study: To schedule imaging studies, such as CT scans, ultrasounds, MRIs, or X-rays, contact Imaging Services at [phone].    How do I schedule a referral to another doctor: If your provider recommended a referral to another specialist(s), the referral order was placed by your provider. You will receive a phone call to schedule this referral, or you may choose to call the number attached to the referral to self-schedule.    For Post-Visit Question(s):  For any inquiries following  today's visit:  Please utilize [employer] messaging and allow 48 hours for reply or contact the Call Center during normal business hours at [phone], option 3.  For Emergent After-hours questions, contact the On-Call GI Fellow through the [facility]  at [phone].  In addition, you may contact your Nurse directly using the provided contact information.    Test Results:  Test results will be accessible via [employer] in compliance with the 21st Century Cures Act. This means that your results will be available to you at the same time as your provider. Often you may see your results before your provider does. Results are reviewed by staff within two weeks with communication follow-up. Results may be released in the patient portal prior to your care team review.    Prescription Refill(s):  Medication prescribed by your provider will be addressed during your visit. For future refills, please coordinate with your pharmacy. If you have not had a recent clinic visit or routine labs, for your safety, your provider may not be able to refill your prescription.     Sincerely,  SALVATORE Warner,  [facility],  Division of Gastroenterology   ([facility])

## 2025-02-21 NOTE — LETTER
2/21/2025      Mora Maldonado  [address]  [place] [address]      Dear Colleague,    Thank you for referring your patient, Mora Maldonado, to the [facility]. Please see a copy of my visit note below.      [facility]  [address] [place] [id number]  Phone: [phone]    Patient:  Mora Maldonado, Date of birth 1995    Referring Provider: Abdoul Tinoco      Gastroenterology CLINIC VISIT, RETURN PATIENT    REASON FOR CONSULTATION: follow up    HPI: 29 year old female presented to GI clinic for a follow up on constipation. This is my first encounter with the patient; she was previously seen by Belen HUBBARD PA-C, and Dr. Bobby.  The patient has hx of chronic abdominal pain, constipation, hypothyroidism,  anxiety, cholecystectomy, and possible gluten sensitivity. Noted recent very high TSH at 131.4 with free T4 of 0.3. Patient stated that she had no insurance and ran out her levothyroxine. Back on medication now.  Reported having problems with constipation since her early childhood. Tried OTC stool softeners and laxative with intermittent effect. Then, was started on Amitiza 24 mcg twice a day. Stated that the medication works well for her but she takes it daily only because sometimes, she has loose stools. Having bowel movement every day or every other day.   Complains of abdominal bloating.  Verbalized concerns about acid reflux and upper abdominal pain. Takes omeprazole and it controls symptoms of acid reflux most days of a week.  Sometimes, has nausea, but no vomiting. Hx of cholecystectomy.    PREVIOUS ENDOSCOPY:  6/3/2021 Colonoscopy  Findings:       The perianal and digital rectal examinations were normal.        The entire examined colon appeared normal.        The terminal ileum appeared normal.        Biopsies for histology were taken with a cold forceps from the right        colon and left colon for evaluation of microscopic  colitis.        Biopsies were taken with a cold forceps in the terminal ileum for        histology.     6/3/21 EGD  Findings:       The examined esophagus was normal.        The Z-line was regular and was found 39 cm from the incisors.        A few diminutive sessile fundic gland polyps with no bleeding and no        stigmata of recent bleeding were found in the gastric fundus. The polyp        was removed with a cold biopsy forceps. Resection and retrieval were        complete.        The exam was otherwise without abnormality.      FINAL DIAGNOSIS:  A. DUODENAL BIOPSY:  - Duodenal mucosa with no significant histologic abnormality  - No evidence of celiac sprue or peptic duodenitis    B. STOMACH, FUNDIC GLAND POLYP, POLYPECTOMY:  - Fundic gland polyp; negative for dysplasia  - A fragment of duodenal mucosa identified    C. TERMINAL ILEUM, BIOPSY:  - Ileal mucosa with no significant histologic abnormality    D. RIGHT COLON, BIOPSY:  - Colonic mucosa with no significanthistologic abnormality  - No evidence ofinflammation (including microscopic colitis)    E. RANDOM LEFT COLON AND RECTUM, BIOPSIES:  - Colorectal mucosa with no significanthistologic abnormality  - No evidence ofinflammation (including microscopic colitis)                                                      PERTINENT STUDIES Reviewed in EMR      ROS: 10pt ROS performed and otherwise negative.    PAST MEDICAL HISTORY:  Past Medical History:   Diagnosis Date     Abnormal Pap smear of cervix 09/03/2019    See problem list     Anxiety      Depressive disorder      Hypothyroidism      Thyroid disease      Uncomplicated asthma        PREVIOUS ABDOMINAL/GYNECOLOGIC SURGERIES:  Past Surgical History:   Procedure Laterality Date     COLONOSCOPY N/A 6/3/2021    Procedure: COLONOSCOPY, WITH BIOPSY;  Surgeon: Lindy Garcia MD;  Location: [facility] OR     ESOPHAGOSCOPY, GASTROSCOPY, DUODENOSCOPY (EGD), COMBINED N/A 6/3/2021    Procedure:  ESOPHAGOGASTRODUODENOSCOPY, WITH BIOPSY;  Surgeon: Lindy Garcia MD;  [place] [facility]     LAPAROSCOPIC CHOLECYSTECTOMY N/A 5/15/2020    Procedure: laparoscopic cholecystectomy;  Surgeon: Peter Hidalgo MD;  Location: [facility] OR         PERTINENT MEDICATIONS:  Current Outpatient Medications   Medication Sig Dispense Refill     buPROPion (WELLBUTRIN XL) 300 MG 24 hr tablet Take 1 tablet (300 mg) by mouth every morning. 90 tablet 1     levonorgestrel (MIRENA) 20 MCG/24HR IUD 1 each (20 mcg) by Intrauterine route once       levothyroxine (SYNTHROID/LEVOTHROID) 150 MCG tablet Take 1 tablet (150 mcg) by mouth every morning (before breakfast). 90 tablet 1     lubiprostone (AMITIZA) 24 MCG capsule Take 1 capsule (24 mcg) by mouth 2 times daily (with meals). 180 capsule 3     VENTOLIN  (90 Base) MCG/ACT inhaler Inhale 2 puffs into the lungs 4 times daily as needed 18 g 3         SOCIAL HISTORY:  Social History     Socioeconomic History     Marital status: Single     Spouse name: Not on file     Number of children: Not on file     Years of education: Not on file     Highest education level: Not on file   Occupational History     Not on file   Tobacco Use     Smoking status: Never     Passive exposure: Never     Smokeless tobacco: Never   Vaping Use     Vaping status: Never Used   Substance and Sexual Activity     Alcohol use: Yes     Comment: once a month     Drug use: Never     Sexual activity: Yes     Partners: Male     Birth control/protection: Implant   Other Topics Concern     Parent/sibling w/ CABG, MI or angioplasty before 65F 55M? Not Asked   Social History Narrative     Not on file     Social Drivers of Health     Financial Resource Strain: Low Risk  (2/13/2025)    Financial Resource Strain      Within the past 12 months, have you or your family members you live with been unable to get utilities (heat, electricity) when it was really needed?: No   Food Insecurity: Low Risk  (2/13/2025)    Food  Insecurity      Within the past 12 months, did you worry that your food would run out before you got money to buy more?: No      Within the past 12 months, did the food you bought just not last and you didn t have money to get more?: No   Transportation Needs: Low Risk  (2/13/2025)    Transportation Needs      Within the past 12 months, has lack of transportation kept you from medical appointments, getting your medicines, non-medical meetings or appointments, work, or from getting things that you need?: No   Physical Activity: Insufficiently Active (2/13/2025)    Exercise Vital Sign      Days of Exercise per Week: 5 days      Minutes of Exercise per Session: 10 min   Stress: No Stress Concern Present (2/13/2025)    [employer] [place] [employer] - Occupational Stress Questionnaire      Feeling of Stress : Only a little   Social Connections: Unknown (2/13/2025)    Social Connection and Isolation Panel [NHANES]      Frequency of Communication with Friends and Family: Not on file      Frequency of Social Gatherings with Friends and Family: Once a week      Attends Mu-ism Services: Not on file      Active Member of Clubs or Organizations: Not on file      Attends Club or Organization Meetings: Not on file      Marital Status: Not on file   Interpersonal Safety: Low Risk  (2/13/2025)    Interpersonal Safety      Do you feel physically and emotionally safe where you currently live?: Yes      Within the past 12 months, have you been hit, slapped, kicked or otherwise physically hurt by someone?: No      Within the past 12 months, have you been humiliated or emotionally abused in other ways by your partner or ex-partner?: No   Housing Stability: Low Risk  (2/13/2025)    Housing Stability      Do you have housing? : Yes      Are you worried about losing your housing?: No       FAMILY HISTORY:  Denies colon/panc/esophageal/other GI CA, no other Blanc or other HPS-related Cheli. No IBD/celiac, no other  "AI/liver/thyroid disease.    Family History   Problem Relation Age of Onset     Rheumatoid Arthritis Mother      Thyroid Disease Maternal Grandmother      Hypertension Sister      Crohn's Disease No family hx of      Ulcerative Colitis No family hx of        PHYSICAL EXAMINATION:  Vitals reviewed  /78 (BP Location: Right arm, Patient Position: Sitting, Cuff Size: Adult Regular)   Pulse 88   Ht 1.58 m (5' 2.21\")   Wt 93.9 kg (207 lb)   LMP 02/13/2020 (Exact Date)   Breastfeeding No   BMI 37.61 kg/m      General: Patient appears well in no acute distress.    Skin: No visualized rash or lesions on visualized skin  HEENT:    EOMI, no erythema, sclera icterus or discharge noted.  Mouth mucosa intact, pink, moist  No cervical or supraclavicular lymphadenopathy. Thyroid gland not enlarged.  Resp: breathing comfortably without accessory muscle usage, speaking in full sentences, no cough. Lung sounds clear  Card: Regular and rhythmic S1 and S2. No gallop or rub. No murmur.  No LE edema.  Abdomen: Active bowel sounds X 4 quadrants. Soft to palpation. Discomfort in epigastrium.  No guarding or rebound tenderness. Crockett's sign negative.  MSK: Appears to have normal range of motion based on visualized movements  Neurologic: No apparent tremors, facial movements symmetric  Psych: affect normal, alert and oriented      ASSESSMENT/PLAN:    ICD-10-CM    1. Abdominal bloating  R14.0 Adult GI  Referral - Procedure Only     XR Abdomen 2 Views     lubiprostone (AMITIZA) 24 MCG capsule      2. Constipation, unspecified constipation type  K59.00 Adult GI  Referral - Consult Only     lubiprostone (AMITIZA) 24 MCG capsule      3. Epigastric pain  R10.13 XR Abdomen 2 Views      4. Gastroesophageal reflux disease without esophagitis  K21.9 Adult GI  Referral - Procedure Only         29 year old female  presented to GI clinic for intermittent acid reflux and epigastric pain despite taking omeprazole. " Also, requested consultation on constipation management. Hx of cholecystectomy.  Agreed on the following plan:  - proceed with upper GI endoscopy. If indicated, will have biopsy for celiac disease and H.pylori infection.  -Continue current dose of omeprazole.  - Try taking lubiprostone 24 mcg twice a day. If start having loose stools every day, reduce the second dose to 12 mcg (educated to open capsules).  - abdominal x-ray today. Patient reported having bowel movement today.  - maintain proper hydration and fiber intake.  - avoid NSAIDs  Patient verbalized understanding and appreciation of care provided. Stated that all of the questions were answered to her/his satisfaction.  RTC in 3 months    Thank you for this consultation. It was a pleasure to participate in the care of this patient; please contact us with any further questions.    SHAWNEE Warner, FNP-C  Division of Gastroenterology  [facility], [place], [place]    This note was created with Dragon voice recognition software, and while reviewed for accuracy, inadvertent minor typographic errors may occur. Please contact the provider if you have any questions.      Again, thank you for allowing me to participate in the care of your patient.        Sincerely,        SHAWNEE WARNER CNP    Electronically signed

## 2025-02-27 ENCOUNTER — TELEPHONE (OUTPATIENT)
Dept: GASTROENTEROLOGY | Facility: CLINIC | Age: 30
End: 2025-02-27
Payer: COMMERCIAL

## 2025-02-27 NOTE — TELEPHONE ENCOUNTER
"Endoscopy Scheduling Screen    Have you had any respiratory illness or flu-like symptoms in the last 10 days?  No    What is your communication preference for Instructions and/or Bowel Prep?   MyChart    What insurance is in the chart?  Other:      Ordering/Referring Provider: KIYA LEPE   (If ordering provider performs procedure, schedule with ordering provider unless otherwise instructed. )    BMI: Estimated body mass index is 37.61 kg/m  as calculated from the following:    Height as of 2/21/25: 1.58 m (5' 2.21\").    Weight as of 2/21/25: 93.9 kg (207 lb).     Sedation Ordered  moderate sedation.   If patient BMI > 50 do not schedule in [facility].    If patient BMI > 45 do not schedule at [facility].    Are you taking methadone or Suboxone?  NO, No RN review required.    Have you been diagnosed and are being treated for severe PTSD or severe anxiety?  NO, No RN review required.    Are you taking any prescription medications for pain 3 or more times per week?   NO, No RN review required.    Do you have a history of malignant hyperthermia?  No    (Females) Are you currently pregnant?   No     Have you been diagnosed or told you have pulmonary hypertension?   No    Do you have an LVAD?  No    Have you been told you have moderate to severe sleep apnea?  No.    Have you been told you have COPD, asthma, or any other lung disease?  Yes     What breathing problems do you have?  Asthma     Do you use home oxygen?  No    Have your breathing problems required an ED visit or hospitalization in the last year?  No.    Has your doctor ordered any cardiac tests like echo, angiogram, stress test, ablation, or EKG, that you have not completed yet?  No    Do you  have a history of any heart conditions?  No     Have you ever had or are you waiting for an organ transplant?  No. Continue scheduling, no site restrictions.    Have you had a stroke or transient ischemic attack (TIA aka \"mini stroke\") in the last 2 years?   No.    Have you been " "diagnosed with or been told you have cirrhosis of the liver?   No.    Are you currently on dialysis?   No    Do you need assistance transferring?   No    BMI: Estimated body mass index is 37.61 kg/m  as calculated from the following:    Height as of 2/21/25: 1.58 m (5' 2.21\").    Weight as of 2/21/25: 93.9 kg (207 lb).     Is patients BMI > 40 and scheduling location [facility]?  No    Do you take an injectable or oral medication for weight loss or diabetes (excluding insulin)?  No    Do you take the medication Naltrexone?  No    Do you take blood thinners?  No       Prep   Are you currently on dialysis or do you have chronic kidney disease?  No    Do you have a diagnosis of diabetes?  No    Do you have a diagnosis of cystic fibrosis (CF)?  No    On a regular basis do you go 3 -5 days between bowel movements?  No    BMI > 40?  No    Preferred Pharmacy:    [id number] [place] [address] [address]  Phone: [phone] Fax: [phone]    Final Scheduling Details     Procedure scheduled  Upper endoscopy (EGD)    Surgeon:  JONELLE     Date of procedure:  4/16/25     Pre-OP / PAC:   No - Not required for this site.    Location  [facility] - Per order.    Sedation   MAC/Deep Sedation  Per location.      Patient Reminders:   You will receive a call from a Nurse to review instructions and health history.  This assessment must be completed prior to your procedure.  Failure to complete the Nurse assessment may result in the procedure being cancelled.      On the day of your procedure, please designate an adult(s) who can drive you home stay with you for the next 24 hours. The medicines used in the exam will make you sleepy. You will not be able to drive.      You cannot take public transportation, ride share services, or non-medical taxi service without a responsible caregiver.  Medical transport services are allowed with the requirement that a responsible caregiver will receive you at your " destination.  We require that drivers and caregivers are confirmed prior to your procedure.

## 2025-04-02 ENCOUNTER — TELEPHONE (OUTPATIENT)
Dept: GASTROENTEROLOGY | Facility: CLINIC | Age: 30
End: 2025-04-02
Payer: COMMERCIAL

## 2025-04-02 NOTE — TELEPHONE ENCOUNTER
Pre visit planning completed.      Procedure details:    Patient scheduled for Upper endoscopy (EGD) on 4/16/25.     Arrival time: 1130. Procedure time 1230    Facility location: [facility]; [address] [place] [address] , [place] [address]. Check in location: Main entrance at Surgery registation desk.    Sedation type: MAC    Pre op exam needed? No.    Indication for procedure: epigastric pain      Chart review:     Electronic implanted devices? No    Recent diagnosis of diverticulitis within the last 6 weeks? No      Medication review:    Diabetic? No    Anticoagulants? No    Weight loss medication/injectable? No GLP-1 medication per patient's medication list. Nursing to verify with pre-assessment call.    Other medication HOLDING recommendations:  N/A      Prep for procedure:     Bowel prep recommendation: N/A    Procedure information and instructions sent via eddie Davison RN  Endoscopy Procedure Pre Assessment   [phone] option 3

## 2025-04-02 NOTE — TELEPHONE ENCOUNTER
Attempted to contact patient in order to complete pre assessment questions.     No answer. Left message to return call to [phone] option 3.    Callback communication sent via [employer].    Renée Dangelo LPN

## 2025-04-02 NOTE — TELEPHONE ENCOUNTER
Pre assessment completed for upcoming procedure.   (Please see previous telephone encounter notes for complete details)    Patient returned call.       Procedure details:    Arrival time and facility location reviewed.    Pre op exam needed? No.    Designated  policy reviewed. Instructed to have someone stay 24 hours post procedure.       Medication review:    Medications reviewed. Please see supporting documentation below. Holding recommendations discussed (if applicable).       Prep for procedure:    Procedure prep instructions reviewed.        Any additional information needed:  Patient said she got a message about pre paying for procedure and wants to know if this needs to be paid. Writer advised patient that this department does not handle billing but writer would be happy to transfer call to the billing department to address.  Patient declined the transfer and says she will send them a message.      Patient verbalized understanding and had no questions or concerns at this time.      Chhaya Pittman RN  Endoscopy Procedure Pre Assessment   [phone] option 3

## 2025-04-15 ENCOUNTER — ANESTHESIA EVENT (OUTPATIENT)
Dept: GASTROENTEROLOGY | Facility: CLINIC | Age: 30
End: 2025-04-15
Payer: COMMERCIAL

## 2025-04-15 NOTE — ANESTHESIA PREPROCEDURE EVALUATION
Anesthesia Pre-Procedure Evaluation    Patient: Mora Maldonado   MRN: [id number] : 1995        Procedure : Procedure(s):  Esophagoscopy, gastroscopy, duodenoscopy (EGD), combined          Past Medical History:   Diagnosis Date     Abnormal Pap smear of cervix 2019    See problem list     Anxiety      Depressive disorder      Hypothyroidism      Thyroid disease      Uncomplicated asthma       Past Surgical History:   Procedure Laterality Date     COLONOSCOPY N/A 6/3/2021    Procedure: COLONOSCOPY, WITH BIOPSY;  Surgeon: Lindy Garcia MD;  Location: [facility] OR     ESOPHAGOSCOPY, GASTROSCOPY, DUODENOSCOPY (EGD), COMBINED N/A 6/3/2021    Procedure: ESOPHAGOGASTRODUODENOSCOPY, WITH BIOPSY;  Surgeon: Lindy Garcia MD;  Location: [facility] OR     LAPAROSCOPIC CHOLECYSTECTOMY N/A 5/15/2020    Procedure: laparoscopic cholecystectomy;  Surgeon: Peter Hidalgo MD;  Location: [facility] OR      Allergies   Allergen Reactions     Amoxicillin Hives     Penicillins Hives      Social History     Tobacco Use     Smoking status: Never     Passive exposure: Never     Smokeless tobacco: Never   Substance Use Topics     Alcohol use: Yes     Comment: once a month      Wt Readings from Last 1 Encounters:   25 93.9 kg (207 lb)        Anesthesia Evaluation   Pt has had prior anesthetic. Type: General and MAC.        ROS/MED HX  ENT/Pulmonary:     (+)                      asthma                  Neurologic:  - neg neurologic ROS     Cardiovascular:  - neg cardiovascular ROS     METS/Exercise Tolerance:     Hematologic:  - neg hematologic  ROS     Musculoskeletal:  - neg musculoskeletal ROS     GI/Hepatic: Comment: Lap jacqueline , gluten-sensitive enteropathy    (+)          cholecystitis/cholelithiasis,          Renal/Genitourinary:  - neg Renal ROS     Endo:     (+)          thyroid problem, hypothyroidism,           Psychiatric/Substance Use:     (+) psychiatric history anxiety and depression      "  Infectious Disease:  - neg infectious disease ROS     Malignancy:  - neg malignancy ROS     Other:  - neg other ROS        Physical Exam    Airway  airway exam normal      Mallampati: II   TM distance: > 3 FB   Neck ROM: full   Mouth opening: > 3 cm    Respiratory Devices and Support         Dental       (+) Minor Abnormalities - some fillings, tiny chips      Cardiovascular   cardiovascular exam normal       Rhythm and rate: regular and normal     Pulmonary   pulmonary exam normal        breath sounds clear to auscultation       OUTSIDE LABS:  CBC:   Lab Results   Component Value Date    WBC 3.3 (L) 07/14/2023    WBC 5.5 05/25/2023    HGB 15.6 07/14/2023    HGB 14.1 05/25/2023    HCT 45.8 07/14/2023    HCT 40.2 05/25/2023     07/14/2023     05/25/2023     BMP:   Lab Results   Component Value Date     05/25/2023     04/13/2021    POTASSIUM 4.0 05/25/2023    POTASSIUM 4.3 04/13/2021    CHLORIDE 102 05/25/2023    CHLORIDE 104 04/13/2021    CO2 25 05/25/2023    CO2 23 04/13/2021    BUN 11.3 05/25/2023    BUN 14 04/13/2021    CR 0.99 (H) 05/25/2023    CR 0.90 04/13/2021     (H) 05/25/2023    GLC 78 04/13/2021     COAGS: No results found for: \"PTT\", \"INR\", \"FIBR\"  POC:   Lab Results   Component Value Date    HCG Negative 06/03/2021     HEPATIC:   Lab Results   Component Value Date    ALBUMIN 3.7 05/19/2020    PROTTOTAL 7.6 05/19/2020    ALT 31 05/19/2020    AST 19 05/19/2020    ALKPHOS 83 05/19/2020    BILITOTAL 0.3 05/19/2020     OTHER:   Lab Results   Component Value Date    A1C 4.9 11/12/2019    NOEL 9.3 05/25/2023    LIPASE 120 05/19/2020    AMYLASE 81 04/14/2020    .40 (H) 02/13/2025    T4 0.30 (L) 02/13/2025    CRP <2.9 07/18/2021    SED 9 07/18/2021       Anesthesia Plan    ASA Status:  2    NPO Status:  NPO Appropriate    Anesthesia Type: MAC.     - Reason for MAC: immobility needed   Induction: Propofol.   Maintenance: TIVA.        Consents    Anesthesia Plan(s) and " associated risks, benefits, and realistic alternatives discussed. Questions answered and patient/representative(s) expressed understanding.     - Discussed:     - Discussed with:  Patient            Postoperative Care            Comments:    Other Comments: I have discussed all the risks and benefits of the anesthetic with the patient and they wish to proceed.           SHAWNEE Villegas CRNA    Clinically Significant Risk Factors Present on Admission

## 2025-04-16 ENCOUNTER — HOSPITAL ENCOUNTER (OUTPATIENT)
Facility: CLINIC | Age: 30
Discharge: HOME | End: 2025-04-16
Attending: INTERNAL MEDICINE | Admitting: INTERNAL MEDICINE
Payer: COMMERCIAL

## 2025-04-16 ENCOUNTER — ANESTHESIA (OUTPATIENT)
Dept: GASTROENTEROLOGY | Facility: CLINIC | Age: 30
End: 2025-04-16
Payer: COMMERCIAL

## 2025-04-16 VITALS
TEMPERATURE: 97.8 F | RESPIRATION RATE: 16 BRPM | OXYGEN SATURATION: 99 % | DIASTOLIC BLOOD PRESSURE: 80 MMHG | SYSTOLIC BLOOD PRESSURE: 119 MMHG | HEART RATE: 92 BPM

## 2025-04-16 LAB — UPPER GI ENDOSCOPY: NORMAL

## 2025-04-16 PROCEDURE — 250N000009 HC RX 250: Performed by: NURSE ANESTHETIST, CERTIFIED REGISTERED

## 2025-04-16 PROCEDURE — 88305 TISSUE EXAM BY PATHOLOGIST: CPT | Mod: 26 | Performed by: PATHOLOGY

## 2025-04-16 PROCEDURE — 370N000017 HC ANESTHESIA TECHNICAL FEE, PER MIN: Performed by: INTERNAL MEDICINE

## 2025-04-16 PROCEDURE — 258N000003 HC RX IP 258 OP 636: Performed by: NURSE ANESTHETIST, CERTIFIED REGISTERED

## 2025-04-16 PROCEDURE — 43239 EGD BIOPSY SINGLE/MULTIPLE: CPT | Performed by: INTERNAL MEDICINE

## 2025-04-16 PROCEDURE — 88305 TISSUE EXAM BY PATHOLOGIST: CPT | Mod: TC | Performed by: INTERNAL MEDICINE

## 2025-04-16 PROCEDURE — 250N000011 HC RX IP 250 OP 636: Performed by: NURSE ANESTHETIST, CERTIFIED REGISTERED

## 2025-04-16 RX ORDER — OXYCODONE HYDROCHLORIDE 5 MG/1
10 TABLET ORAL
Status: DISCONTINUED | OUTPATIENT
Start: 2025-04-16 | End: 2025-04-16 | Stop reason: HOSPADM

## 2025-04-16 RX ORDER — DEXAMETHASONE SODIUM PHOSPHATE 10 MG/ML
4 INJECTION, SOLUTION INTRAMUSCULAR; INTRAVENOUS
Status: DISCONTINUED | OUTPATIENT
Start: 2025-04-16 | End: 2025-04-16 | Stop reason: HOSPADM

## 2025-04-16 RX ORDER — ONDANSETRON 4 MG/1
4 TABLET, ORALLY DISINTEGRATING ORAL EVERY 30 MIN PRN
Status: DISCONTINUED | OUTPATIENT
Start: 2025-04-16 | End: 2025-04-16 | Stop reason: HOSPADM

## 2025-04-16 RX ORDER — OXYCODONE HYDROCHLORIDE 5 MG/1
5 TABLET ORAL
Status: DISCONTINUED | OUTPATIENT
Start: 2025-04-16 | End: 2025-04-16 | Stop reason: HOSPADM

## 2025-04-16 RX ORDER — ONDANSETRON 2 MG/ML
4 INJECTION INTRAMUSCULAR; INTRAVENOUS EVERY 30 MIN PRN
Status: DISCONTINUED | OUTPATIENT
Start: 2025-04-16 | End: 2025-04-16 | Stop reason: HOSPADM

## 2025-04-16 RX ORDER — PROPOFOL 10 MG/ML
INJECTION, EMULSION INTRAVENOUS CONTINUOUS PRN
Status: DISCONTINUED | OUTPATIENT
Start: 2025-04-16 | End: 2025-04-16

## 2025-04-16 RX ORDER — PROPOFOL 10 MG/ML
INJECTION, EMULSION INTRAVENOUS PRN
Status: DISCONTINUED | OUTPATIENT
Start: 2025-04-16 | End: 2025-04-16

## 2025-04-16 RX ORDER — NALOXONE HYDROCHLORIDE 0.4 MG/ML
0.1 INJECTION, SOLUTION INTRAMUSCULAR; INTRAVENOUS; SUBCUTANEOUS
Status: DISCONTINUED | OUTPATIENT
Start: 2025-04-16 | End: 2025-04-16 | Stop reason: HOSPADM

## 2025-04-16 RX ORDER — SODIUM CHLORIDE, SODIUM LACTATE, POTASSIUM CHLORIDE, CALCIUM CHLORIDE 600; 310; 30; 20 MG/100ML; MG/100ML; MG/100ML; MG/100ML
INJECTION, SOLUTION INTRAVENOUS CONTINUOUS
Status: DISCONTINUED | OUTPATIENT
Start: 2025-04-16 | End: 2025-04-16 | Stop reason: HOSPADM

## 2025-04-16 RX ORDER — LIDOCAINE HYDROCHLORIDE 20 MG/ML
INJECTION, SOLUTION INFILTRATION; PERINEURAL PRN
Status: DISCONTINUED | OUTPATIENT
Start: 2025-04-16 | End: 2025-04-16

## 2025-04-16 RX ADMIN — LIDOCAINE HYDROCHLORIDE 30 MG: 20 INJECTION, SOLUTION INFILTRATION; PERINEURAL at 12:32

## 2025-04-16 RX ADMIN — SODIUM CHLORIDE, SODIUM LACTATE, POTASSIUM CHLORIDE, AND CALCIUM CHLORIDE: .6; .31; .03; .02 INJECTION, SOLUTION INTRAVENOUS at 12:13

## 2025-04-16 RX ADMIN — PROPOFOL 130 MG: 10 INJECTION, EMULSION INTRAVENOUS at 12:32

## 2025-04-16 RX ADMIN — PROPOFOL 200 MCG/KG/MIN: 10 INJECTION, EMULSION INTRAVENOUS at 12:32

## 2025-04-16 ASSESSMENT — ACTIVITIES OF DAILY LIVING (ADL)
ADLS_ACUITY_SCORE: 41
ADLS_ACUITY_SCORE: 41

## 2025-04-16 NOTE — ANESTHESIA CARE TRANSFER NOTE
Patient: Mora Maldonado    Procedure: Procedure(s):  ESOPHAGOGASTRODUODENOSCOPY, WITH BIOPSY       Diagnosis: Abdominal bloating [R14.0]  Gastroesophageal reflux disease without esophagitis [K21.9]  Diagnosis Additional Information: No value filed.    Anesthesia Type:   MAC     Note:    Oropharynx: oropharynx clear of all foreign objects  Level of Consciousness: awake  Oxygen Supplementation: room air    Independent Airway: airway patency satisfactory and stable  Dentition: dentition unchanged  Vital Signs Stable: post-procedure vital signs reviewed and stable  Report to RN Given: handoff report given  Patient transferred to: Phase II  Comments: To Phase II. Report to RN.  VSS Resp status stable.  Handoff Report: Identifed the Patient, Identified the Reponsible Provider, Reviewed the pertinent medical history, Discussed the surgical course, Reviewed Intra-OP anesthesia mangement and issues during anesthesia, Set expectations for post-procedure period and Allowed opportunity for questions and acknowledgement of understanding      Vitals:  Vitals Value Taken Time   BP     Temp     Pulse     Resp     SpO2 96 % 04/16/25 1247   Vitals shown include unfiled device data.    Electronically Signed By: SHAWNEE Villegas CRNA  April 16, 2025  12:48 PM

## 2025-04-16 NOTE — LETTER
April 21, 2025      Mora A Erica  [address]   [place] [address]        Dear ,    We are writing to inform you of your test results.    Your test results fall within the expected range(s) or remain unchanged from previous results.  Please continue with current treatment plan.    Resulted Orders   Surgical Pathology Exam   Result Value Ref Range    Case Report       Surgical Pathology Report                         Case: [id number]                                  Authorizing Provider:  Stephan Bobby MD        Collected:           04/16/2025 12:34 PM          Ordering Location:     [facility]          Received:            04/17/2025 06:27 AM                                 [facility]                                                          Pathologist:           Ramiro Oscar MD                                                            Specimens:   A) - Small Intestine, Small bowel biopsy for diarrhea                                               B) - Stomach, Body, Gastric biopsy                                                         Final Diagnosis       A. Duodenum, biopsies:  --No significant histopathologic change.    B. Stomach, biopsies:  --Gastric mucosa with mild chronic inactive gastritis.  --Negative for Helicobacter pylori organisms or dysplasia.            Clinical Information       Procedure:  ESOPHAGOGASTRODUODENOSCOPY, WITH BIOPSY  Pre-op Diagnosis: Abdominal bloating [R14.0]  Gastroesophageal reflux disease without esophagitis [K21.9]  Post-op Diagnosis: R14.0 - Abdominal bloating [ICD-10-CM]  K21.9 - Gastroesophageal reflux disease without esophagitis [ICD-10-CM]      Gross Description       A(1). Small Intestine, Small bowel biopsy for diarrhea:  The specimen is received in formalin, labeled with the patient's name, medical record number and other identifying information and designated  small bowel biopsy . It consists of 5 tan soft tissue fragments  ranging from 0.1-0.3 cm. Entirely submitted in one cassette.    B(2). Stomach, Body, Gastric biopsy:  The specimen is received in formalin, labeled with the patient's name, medical record number and other identifying information and designated  gastric biopsy . It consists of 4 tan soft tissue fragments ranging from 0.2-0.4 cm. Entirely submitted in one cassette.   (KAYODE Nicholas)4/17/2025 8:01 AM       Microscopic Description       Microscopic examination was performed.        Performing Labs       The technical component of this testing was completed at [facility].    Stain controls for all stains resulted within this report have been reviewed and show appropriate reactivity.       Case Images         If you have any questions or concerns, please call the clinic at the number listed above.       Sincerely,      Stephan Bobby MD    Electronically signed

## 2025-04-16 NOTE — ANESTHESIA POSTPROCEDURE EVALUATION
Patient: Mora Maldonado    Procedure: Procedure(s):  ESOPHAGOGASTRODUODENOSCOPY, WITH BIOPSY       Anesthesia Type:  MAC    Note:  Disposition: Outpatient   Postop Pain Control: Uneventful            Sign Out: Well controlled pain   PONV: No   Neuro/Psych: Uneventful            Sign Out: Acceptable/Baseline neuro status   Airway/Respiratory: Uneventful            Sign Out: Acceptable/Baseline resp. status   CV/Hemodynamics: Uneventful            Sign Out: Acceptable CV status; No obvious hypovolemia; No obvious fluid overload   Other NRE: NONE   DID A NON-ROUTINE EVENT OCCUR? No           Last vitals:  Vitals Value Taken Time   /80 04/16/25 1315   Temp     Pulse 92 04/16/25 1315   Resp 16 04/16/25 1315   SpO2 100 % 04/16/25 1317   Vitals shown include unfiled device data.    Electronically Signed By: SHAWNEE Villegas CRNA  April 16, 2025  1:52 PM

## 2025-04-16 NOTE — DISCHARGE INSTRUCTIONS
[facility]    Home Care Following Endoscopy          Activity:  You have just undergone an endoscopic procedure usually performed with conscious sedation.  Do not work or operate machinery (including a car) for at least 12 hours.    I encourage you to walk and attempt to pass this air as soon as possible.    Diet:  Return to the diet you were on before your procedure but eat lightly for the first 12-24 hours.  Drink plenty of water.  Resume any regular medications unless otherwise advised by your physician.  Please begin any new medication prescribed as a result of your procedure as directed by your physician.   If you had any biopsy or polyp removed please refrain from aspirin or aspirin products for 2 days.  If on Coumadin please restart as instructed by your physician.   Pain:  You may take Tylenol as needed for pain.  Expected Recovery:   It is normal to have a mild sore throat after upper endoscopy.    Call Your Physician if You Have:  After Upper Endoscopy:  Shoulder, back or chest pain.  Difficulty breathing or swallowing.  Vomiting blood.      Any questions or concerns about your recovery, please call [phone] or after [facility] ([phone]) Nurse Advice Line.    Follow-up Care:  You did have polyps/biopsy tissue sample(s) removed.  The polyps/biopsy tissue sample(s) will be sent to pathology.    You should receive letter in your My Chart from [facility] with your results within 1-2 weeks. If you do not participate in My Chart a physical letter will come in the mail in 2-3 weeks.  Please call if you have not received a notification of your results.  If asked to return to clinic please make an appointment 1 week after your procedure.  Call [phone].

## 2025-04-16 NOTE — H&P
[facility] Anesthesia Pre-op History and Physical    Mora Maldonado MRN# [id number]   Age: 30 year old YOB: 1995      Date of Surgery: 4/16/2025 Location [facility]      Date of Exam 4/16/2025 Facility (In hospital)       Home clinic: [facility]  Primary care provider: Abdoul Tinoco         Chief Complaint and/or Reason for Procedure:   No chief complaint on file.  EGD. Gerd, bloating. Bx for Hp and celiac requested.  Exam 2021 gastric, duodenal, ileal and colon bx all normal.       Active problem list:     Patient Active Problem List    Diagnosis Date Noted    Gluten-sensitive enteropathy 06/02/2022     Priority: Medium    IUD (intrauterine device) in place - due for removal 11/2026 11/20/2020     Priority: Medium    Adjustment disorder with mixed anxiety and depressed mood 01/20/2020     Priority: Medium    Hypothyroidism, unspecified type 09/09/2019     Priority: Medium    Papanicolaou smear of cervix with low grade squamous intraepithelial lesion (LGSIL) 09/03/2019     Priority: Medium     2014 NIL pap. (Found in Care Everywhere).  9/3/19 LSIL pap. Plan colp.   9/20/19 [place] Bx & ECC - Negative. Plan cotest in 1 year.   11/11/20 NIL Pap, Neg HR HPV. Plan: cotest in 3 years.  2/6/24 NIL pap, neg HPV. Plan: cotest in 3 years                Medications (include herbals and vitamins):   Any Plavix use in the last 7 days? No     No current facility-administered medications for this encounter.     Current Outpatient Medications   Medication Sig Dispense Refill    buPROPion (WELLBUTRIN XL) 300 MG 24 hr tablet Take 1 tablet (300 mg) by mouth every morning. 90 tablet 1    levonorgestrel (MIRENA) 20 MCG/24HR IUD 1 each (20 mcg) by Intrauterine route once      levothyroxine (SYNTHROID/LEVOTHROID) 150 MCG tablet Take 1 tablet (150 mcg) by mouth every morning (before breakfast). 90 tablet 1    lubiprostone (AMITIZA) 24 MCG capsule Take 1 capsule  (24 mcg) by mouth 2 times daily (with meals). 180 capsule 3    VENTOLIN  (90 Base) MCG/ACT inhaler Inhale 2 puffs into the lungs 4 times daily as needed 18 g 3             Allergies:      Allergies   Allergen Reactions    Amoxicillin Hives    Penicillins Hives     Allergy to Latex? No  Allergy to tape?   No  Intolerances:             Physical Exam:   All vitals have been reviewed  No data found.  No intake/output data recorded.  Lungs:   No increased work of breathing, good air exchange, clear to auscultation bilaterally, no crackles or wheezing     Cardiovascular:   Normal apical impulse, regular rate and rhythm, normal S1 and S2, no S3 or S4, and no murmur noted             Lab / Radiology Results:            Anesthetic risk and/or ASA classification:       Stephan Bobby MD

## 2025-04-18 LAB
PATH REPORT.COMMENTS IMP SPEC: NORMAL
PATH REPORT.COMMENTS IMP SPEC: NORMAL
PATH REPORT.FINAL DX SPEC: NORMAL
PATH REPORT.GROSS SPEC: NORMAL
PATH REPORT.MICROSCOPIC SPEC OTHER STN: NORMAL
PATH REPORT.RELEVANT HX SPEC: NORMAL
PHOTO IMAGE: NORMAL

## 2025-08-12 DIAGNOSIS — E03.9 HYPOTHYROIDISM, UNSPECIFIED TYPE: ICD-10-CM

## 2025-08-12 RX ORDER — LEVOTHYROXINE SODIUM 150 UG/1
150 TABLET ORAL
Qty: 90 TABLET | Refills: 1 | Status: SHIPPED | OUTPATIENT
Start: 2025-08-12

## 2025-08-13 ENCOUNTER — TELEPHONE (OUTPATIENT)
Dept: FAMILY MEDICINE | Facility: CLINIC | Age: 30
End: 2025-08-13
Payer: COMMERCIAL

## 2025-08-13 DIAGNOSIS — F43.23 ADJUSTMENT DISORDER WITH MIXED ANXIETY AND DEPRESSED MOOD: ICD-10-CM

## 2025-08-18 RX ORDER — BUPROPION HYDROCHLORIDE 300 MG/1
300 TABLET ORAL EVERY MORNING
Qty: 90 TABLET | Refills: 1 | Status: SHIPPED | OUTPATIENT
Start: 2025-08-18

## 2025-08-23 ENCOUNTER — MYC REFILL (OUTPATIENT)
Dept: GASTROENTEROLOGY | Facility: CLINIC | Age: 30
End: 2025-08-23
Payer: COMMERCIAL

## 2025-08-23 ENCOUNTER — MYC REFILL (OUTPATIENT)
Dept: FAMILY MEDICINE | Facility: CLINIC | Age: 30
End: 2025-08-23
Payer: COMMERCIAL

## 2025-08-23 DIAGNOSIS — R14.0 ABDOMINAL BLOATING: ICD-10-CM

## 2025-08-23 DIAGNOSIS — K59.00 CONSTIPATION, UNSPECIFIED CONSTIPATION TYPE: ICD-10-CM

## 2025-08-23 DIAGNOSIS — F43.23 ADJUSTMENT DISORDER WITH MIXED ANXIETY AND DEPRESSED MOOD: ICD-10-CM

## 2025-08-23 DIAGNOSIS — E03.9 HYPOTHYROIDISM, UNSPECIFIED TYPE: ICD-10-CM

## 2025-08-25 RX ORDER — LEVOTHYROXINE SODIUM 150 UG/1
150 TABLET ORAL
Qty: 90 TABLET | Refills: 1 | Status: SHIPPED | OUTPATIENT
Start: 2025-08-25

## 2025-08-25 RX ORDER — LUBIPROSTONE 0.02 MG/1
24 CAPSULE ORAL 2 TIMES DAILY WITH MEALS
Qty: 180 CAPSULE | Refills: 1 | Status: SHIPPED | OUTPATIENT
Start: 2025-08-25

## 2025-08-25 RX ORDER — BUPROPION HYDROCHLORIDE 300 MG/1
300 TABLET ORAL EVERY MORNING
Qty: 90 TABLET | Refills: 1 | Status: SHIPPED | OUTPATIENT
Start: 2025-08-25

## (undated) DEVICE — SOL WATER IRRIG 1000ML BOTTLE 07139-09

## (undated) DEVICE — TUBING SUCTION 12"X1/4" N612

## (undated) DEVICE — GLOVE EXAM NITRILE LG PF LATEX FREE 5064

## (undated) DEVICE — ENDO POUCH UNIV RETRIEVAL SYSTEM INZII 10MM CD001

## (undated) DEVICE — ENDO FORCEP ENDOJAW BIOPSY 2.8MMX230CM FB-220U

## (undated) DEVICE — ADH SKIN CLOSURE PREMIERPRO EXOFIN 1.0ML 3470

## (undated) DEVICE — DRAPE POUCH INSTRUMENT 3 POCKET 1018L

## (undated) DEVICE — CLIP APPLIER ENDO 5MM M/L LIGAMAX EL5ML

## (undated) DEVICE — GOWN IMPERVIOUS 2XL BLUE

## (undated) DEVICE — SUCTION MANIFOLD NEPTUNE 2 SYS 1 PORT 702-025-000

## (undated) DEVICE — STOCKING SLEEVE COMPRESSION CALF LG

## (undated) DEVICE — ENDO BITE BLOCK ADULT OMNI-BLOC

## (undated) DEVICE — ENDO TROCAR FIRST ENTRY KII FIOS ADV FIX 11X100MM CFF33

## (undated) DEVICE — Device

## (undated) DEVICE — SOL NACL 0.9% IRRIG 1000ML BOTTLE 07138-09

## (undated) DEVICE — SU VICRYL 4-0 FS-2 27" J422-H

## (undated) DEVICE — ESU HOLSTER PLASTIC DISP E2400

## (undated) DEVICE — KIT ENDO TURNOVER/PROCEDURE CARRY-ON 101822

## (undated) DEVICE — ESU ENDO SCISSORS 5MM CVD 5DCS

## (undated) DEVICE — GOWN XLG DISP 9545

## (undated) DEVICE — SU VICRYL 0 UR-6 27" J603H

## (undated) DEVICE — ENDO TROCAR SLEEVE KII ADV FIXATION 05X100MM CFS02

## (undated) DEVICE — ENDO FORCEP BX CAPTURA PRO SPIKE G50696

## (undated) DEVICE — SPECIMEN CONTAINER 3OZ W/FORMALIN 59901

## (undated) DEVICE — SUCTION CATH AIRLIFE TRI-FLO W/CONTROL PORT 14FR  T60C

## (undated) DEVICE — DECANTER VIAL 2006S

## (undated) DEVICE — SYR 30ML SLIP TIP W/O NDL 302833

## (undated) DEVICE — PREP CHLORAPREP 26ML TINTED ORANGE  260815

## (undated) DEVICE — SOL WATER IRRIG 500ML BOTTLE 2F7113

## (undated) DEVICE — ENDO TROCAR BLUNT TIP KII BALLOON 12X100MM C0R47

## (undated) DEVICE — GLOVE PROTEXIS W/NEU-THERA 7.5  2D73TE75

## (undated) DEVICE — ENDO TROCAR FIRST ENTRY KII FIOS ADV FIX 05X100MM CFF03

## (undated) DEVICE — SUCTION IRRIGATION STRYKFLOW II W/TIP DISP 250-070-520

## (undated) DEVICE — LUBRICATING JELLY 4.25OZ

## (undated) DEVICE — ESU CORD MONOPOLAR 10'  E0510

## (undated) DEVICE — SOL NACL 0.9% IRRIG 3000ML BAG 07972-08

## (undated) RX ORDER — HYDROCODONE BITARTRATE AND ACETAMINOPHEN 5; 325 MG/1; MG/1
TABLET ORAL
Status: DISPENSED
Start: 2020-05-15

## (undated) RX ORDER — PROPOFOL 10 MG/ML
INJECTION, EMULSION INTRAVENOUS
Status: DISPENSED
Start: 2020-05-15

## (undated) RX ORDER — ACETAMINOPHEN 325 MG/1
TABLET ORAL
Status: DISPENSED
Start: 2020-05-15

## (undated) RX ORDER — LIDOCAINE HYDROCHLORIDE 10 MG/ML
INJECTION, SOLUTION EPIDURAL; INFILTRATION; INTRACAUDAL; PERINEURAL
Status: DISPENSED
Start: 2020-05-15

## (undated) RX ORDER — BUPIVACAINE HYDROCHLORIDE AND EPINEPHRINE 5; 5 MG/ML; UG/ML
INJECTION, SOLUTION EPIDURAL; INTRACAUDAL; PERINEURAL
Status: DISPENSED
Start: 2020-05-15

## (undated) RX ORDER — MEPERIDINE HYDROCHLORIDE 50 MG/ML
INJECTION INTRAMUSCULAR; INTRAVENOUS; SUBCUTANEOUS
Status: DISPENSED
Start: 2020-05-15

## (undated) RX ORDER — CEFAZOLIN SODIUM 2 G/100ML
INJECTION, SOLUTION INTRAVENOUS
Status: DISPENSED
Start: 2020-05-15

## (undated) RX ORDER — ONDANSETRON 2 MG/ML
INJECTION INTRAMUSCULAR; INTRAVENOUS
Status: DISPENSED
Start: 2020-05-15

## (undated) RX ORDER — GABAPENTIN 300 MG/1
CAPSULE ORAL
Status: DISPENSED
Start: 2020-05-15

## (undated) RX ORDER — PHENYLEPHRINE HCL IN 0.9% NACL 1 MG/10 ML
SYRINGE (ML) INTRAVENOUS
Status: DISPENSED
Start: 2020-05-15

## (undated) RX ORDER — DEXAMETHASONE SODIUM PHOSPHATE 4 MG/ML
INJECTION, SOLUTION INTRA-ARTICULAR; INTRALESIONAL; INTRAMUSCULAR; INTRAVENOUS; SOFT TISSUE
Status: DISPENSED
Start: 2020-05-15

## (undated) RX ORDER — FENTANYL CITRATE 50 UG/ML
INJECTION, SOLUTION INTRAMUSCULAR; INTRAVENOUS
Status: DISPENSED
Start: 2020-05-15

## (undated) RX ORDER — KETOROLAC TROMETHAMINE 30 MG/ML
INJECTION, SOLUTION INTRAMUSCULAR; INTRAVENOUS
Status: DISPENSED
Start: 2020-05-15